# Patient Record
Sex: FEMALE | Race: WHITE | Employment: UNEMPLOYED | URBAN - METROPOLITAN AREA
[De-identification: names, ages, dates, MRNs, and addresses within clinical notes are randomized per-mention and may not be internally consistent; named-entity substitution may affect disease eponyms.]

---

## 2018-12-10 LAB
ABO/RH: ABNORMAL
ANTIBODY SCREEN: NEGATIVE
HCT VFR BLD CALC: 34 % (ref 36–46)
HEMOGLOBIN: 11.9 G/DL (ref 12–16)
HEPATITIS B SURFACE ANTIGEN: NEGATIVE
HIV AG/AB: NONREACTIVE
MCV RBC AUTO: 89 FL
PLATELET COUNT: 223
RUBV IGG SER QL: 4.96
T. PALLIDUM, IGG: NEGATIVE

## 2019-02-11 ENCOUNTER — INITIAL PRENATAL (OUTPATIENT)
Dept: OBGYN | Age: 19
End: 2019-02-11

## 2019-02-11 ENCOUNTER — HOSPITAL ENCOUNTER (OUTPATIENT)
Age: 19
Setting detail: SPECIMEN
Discharge: HOME | End: 2019-02-11
Payer: OTHER GOVERNMENT

## 2019-02-11 VITALS
BODY MASS INDEX: 20.09 KG/M2 | HEIGHT: 67 IN | WEIGHT: 128 LBS | SYSTOLIC BLOOD PRESSURE: 109 MMHG | DIASTOLIC BLOOD PRESSURE: 58 MMHG

## 2019-02-11 DIAGNOSIS — Z34.03 ENCOUNTER FOR SUPERVISION OF NORMAL FIRST PREGNANCY IN THIRD TRIMESTER: ICD-10-CM

## 2019-02-11 DIAGNOSIS — Z34.03 ENCOUNTER FOR SUPERVISION OF NORMAL FIRST PREGNANCY IN THIRD TRIMESTER: Primary | ICD-10-CM

## 2019-02-11 LAB
AMPHETAMINE SCREEN URINE: NEGATIVE
BARBITURATE SCREEN URINE: NEGATIVE
BENZODIAZEPINE SCREEN, URINE: NEGATIVE
BUPRENORPHINE URINE: NEGATIVE
CANNABINOID SCREEN URINE: NEGATIVE
COCAINE METABOLITE, URINE: NEGATIVE
MDMA URINE: NORMAL
METHADONE SCREEN, URINE: NEGATIVE
METHAMPHETAMINE, URINE: NEGATIVE
OPIATES, URINE: NEGATIVE
OXYCODONE SCREEN URINE: NEGATIVE
PHENCYCLIDINE, URINE: NEGATIVE
PROPOXYPHENE, URINE: NEGATIVE
TEST INFORMATION: NORMAL
TRICYCLIC ANTIDEPRESSANTS, UR: NEGATIVE

## 2019-02-11 PROCEDURE — 80306 DRUG TEST PRSMV INSTRMNT: CPT

## 2019-02-11 RX ORDER — PNV NO.95/FERROUS FUM/FOLIC AC 28MG-0.8MG
1 TABLET ORAL DAILY
Qty: 30 TABLET | Refills: 5 | Status: SHIPPED | OUTPATIENT
Start: 2019-02-11 | End: 2019-03-19

## 2019-02-11 RX ORDER — MULTIVITAMIN WITH IRON
100 TABLET ORAL DAILY
COMMUNITY
End: 2019-07-08 | Stop reason: ALTCHOICE

## 2019-02-18 ENCOUNTER — ROUTINE PRENATAL (OUTPATIENT)
Dept: OBGYN | Age: 19
End: 2019-02-18

## 2019-02-18 VITALS — DIASTOLIC BLOOD PRESSURE: 60 MMHG | SYSTOLIC BLOOD PRESSURE: 112 MMHG | WEIGHT: 129 LBS | BODY MASS INDEX: 20.2 KG/M2

## 2019-02-18 DIAGNOSIS — K59.00 CONSTIPATION, UNSPECIFIED CONSTIPATION TYPE: ICD-10-CM

## 2019-02-18 DIAGNOSIS — R11.0 NAUSEA: ICD-10-CM

## 2019-02-18 DIAGNOSIS — Z34.82 ENCOUNTER FOR SUPERVISION OF OTHER NORMAL PREGNANCY IN SECOND TRIMESTER: Primary | ICD-10-CM

## 2019-02-18 DIAGNOSIS — Z3A.16 16 WEEKS GESTATION OF PREGNANCY: ICD-10-CM

## 2019-02-18 PROCEDURE — 0500F INITIAL PRENATAL CARE VISIT: CPT | Performed by: ADVANCED PRACTICE MIDWIFE

## 2019-02-18 RX ORDER — PROMETHAZINE HYDROCHLORIDE 25 MG/1
25 TABLET ORAL EVERY 6 HOURS PRN
Qty: 30 TABLET | Refills: 0 | Status: SHIPPED | OUTPATIENT
Start: 2019-02-18 | End: 2019-02-25

## 2019-03-19 ENCOUNTER — ROUTINE PRENATAL (OUTPATIENT)
Dept: OBGYN | Age: 19
End: 2019-03-19
Payer: OTHER GOVERNMENT

## 2019-03-19 VITALS — WEIGHT: 139 LBS | SYSTOLIC BLOOD PRESSURE: 116 MMHG | BODY MASS INDEX: 21.77 KG/M2 | DIASTOLIC BLOOD PRESSURE: 62 MMHG

## 2019-03-19 DIAGNOSIS — Z36.3 ENCOUNTER FOR ANTENATAL SCREENING FOR MALFORMATION USING ULTRASOUND: Primary | ICD-10-CM

## 2019-03-19 DIAGNOSIS — Z34.02 ENCOUNTER FOR SUPERVISION OF NORMAL FIRST PREGNANCY IN SECOND TRIMESTER: ICD-10-CM

## 2019-03-19 DIAGNOSIS — Z3A.20 20 WEEKS GESTATION OF PREGNANCY: ICD-10-CM

## 2019-03-19 LAB
ABDOMINAL CIRCUMFERENCE: 16.05 CM
BIPARIETAL DIAMETER: 5.03 CM
ESTIMATED FETAL WEIGHT: 377 GRAMS
FEMORAL DIAMETER: NORMAL CM
FEMORAL LENGTH: 3.28 CM
HC/AC: 1.16
HEAD CIRCUMFERENCE: 18.69 CM

## 2019-03-19 PROCEDURE — 76805 OB US >/= 14 WKS SNGL FETUS: CPT | Performed by: OBSTETRICS & GYNECOLOGY

## 2019-03-19 PROCEDURE — 0502F SUBSEQUENT PRENATAL CARE: CPT | Performed by: ADVANCED PRACTICE MIDWIFE

## 2019-04-16 ENCOUNTER — ROUTINE PRENATAL (OUTPATIENT)
Dept: OBGYN | Age: 19
End: 2019-04-16

## 2019-04-16 VITALS — WEIGHT: 144 LBS | SYSTOLIC BLOOD PRESSURE: 122 MMHG | DIASTOLIC BLOOD PRESSURE: 64 MMHG | BODY MASS INDEX: 22.55 KG/M2

## 2019-04-16 DIAGNOSIS — Z3A.24 24 WEEKS GESTATION OF PREGNANCY: ICD-10-CM

## 2019-04-16 DIAGNOSIS — Z34.02 ENCOUNTER FOR SUPERVISION OF NORMAL FIRST PREGNANCY IN SECOND TRIMESTER: Primary | ICD-10-CM

## 2019-04-16 PROCEDURE — 0502F SUBSEQUENT PRENATAL CARE: CPT | Performed by: ADVANCED PRACTICE MIDWIFE

## 2019-04-16 NOTE — PROGRESS NOTES
Alva Olivia is here at 24w4d for:    Chief Complaint   Patient presents with    Routine Prenatal Visit       Estimated Due Date: Estimated Date of Delivery: 19    OB History    Para Term  AB Living   1             SAB TAB Ectopic Molar Multiple Live Births                    # Outcome Date GA Lbr Messi/2nd Weight Sex Delivery Anes PTL Lv   1 Current                 History reviewed. No pertinent past medical history. Past Surgical History:   Procedure Laterality Date    KNEE SURGERY  [id number]       Social History     Tobacco Use   Smoking Status Never Smoker   Smokeless Tobacco Never Used        Social History     Substance and Sexual Activity   Alcohol Use No       No results found for this visit on 19. Vitals:  /64   Wt 144 lb (65.3 kg)   LMP 2018   BMI 22.55 kg/m²   Estimated body mass index is 22.55 kg/m² as calculated from the following:    Height as of 19: 5' 7\" (1.702 m). Weight as of this encounter: 144 lb (65.3 kg). HPI: here for routine ob visit, c/o upper back pain mild     PT denies fever, chills, nausea and vomiting       Abdomen: enlarged, gravid, soft, nontender     Results reviewed today:    No results found for this visit on 19. See prenatal vital sign section and fetal assessment section    ASSESSMENT & Plan    Diagnosis Orders   1. Encounter for supervision of normal first pregnancy in second trimester     2. 24 weeks gestation of pregnancy               I am having Joann Vieira maintain her Prenatal Vit-Fe Fumarate-FA (PRENATAL VITAMIN PO), vitamin B-6, and (Doxylamine Succinate, Sleep, (UNISOM PO)). Return in about 1 month (around 2019) for ob gtt. There are no Patient Instructions on file for this visit.           Mable Johnson,2019 10:23 AM

## 2019-04-27 ENCOUNTER — HOSPITAL ENCOUNTER (OUTPATIENT)
Age: 19
Discharge: HOME | End: 2019-04-28
Attending: ADVANCED PRACTICE MIDWIFE | Admitting: ADVANCED PRACTICE MIDWIFE
Payer: OTHER GOVERNMENT

## 2019-04-27 LAB
-: ABNORMAL
AMORPHOUS: ABNORMAL
BACTERIA: ABNORMAL
BILIRUBIN URINE: NEGATIVE
CASTS UA: ABNORMAL /LPF
COLOR: YELLOW
COMMENT UA: ABNORMAL
CRYSTALS, UA: ABNORMAL /HPF
EPITHELIAL CELLS UA: ABNORMAL /HPF (ref 0–25)
GLUCOSE URINE: ABNORMAL
KETONES, URINE: NEGATIVE
LEUKOCYTE ESTERASE, URINE: ABNORMAL
MUCUS: ABNORMAL
NITRITE, URINE: NEGATIVE
OTHER OBSERVATIONS UA: ABNORMAL
PH UA: 6.5 (ref 5–9)
PROTEIN UA: NEGATIVE
RBC UA: ABNORMAL /HPF (ref 0–2)
RENAL EPITHELIAL, UA: ABNORMAL /HPF
SPECIFIC GRAVITY UA: <1.005 (ref 1.01–1.02)
TRICHOMONAS: ABNORMAL
TURBIDITY: CLEAR
URINE HGB: NEGATIVE
UROBILINOGEN, URINE: NORMAL
WBC UA: ABNORMAL /HPF (ref 0–5)
YEAST: ABNORMAL

## 2019-04-27 PROCEDURE — 81001 URINALYSIS AUTO W/SCOPE: CPT

## 2019-04-27 PROCEDURE — 6370000000 HC RX 637 (ALT 250 FOR IP): Performed by: ADVANCED PRACTICE MIDWIFE

## 2019-04-27 PROCEDURE — 87086 URINE CULTURE/COLONY COUNT: CPT

## 2019-04-27 RX ORDER — CEPHALEXIN 250 MG/1
500 CAPSULE ORAL ONCE
Status: COMPLETED | OUTPATIENT
Start: 2019-04-27 | End: 2019-04-27

## 2019-04-27 RX ADMIN — CEPHALEXIN 500 MG: 250 CAPSULE ORAL at 23:48

## 2019-04-28 VITALS
TEMPERATURE: 97.6 F | RESPIRATION RATE: 14 BRPM | SYSTOLIC BLOOD PRESSURE: 109 MMHG | DIASTOLIC BLOOD PRESSURE: 55 MMHG | HEART RATE: 58 BPM

## 2019-04-28 DIAGNOSIS — O26.899 ABDOMINAL PAIN AFFECTING PREGNANCY: Primary | ICD-10-CM

## 2019-04-28 DIAGNOSIS — R10.9 ABDOMINAL PAIN AFFECTING PREGNANCY: Primary | ICD-10-CM

## 2019-04-28 PROCEDURE — 99214 OFFICE O/P EST MOD 30 MIN: CPT

## 2019-04-28 RX ORDER — CEPHALEXIN 500 MG/1
500 CAPSULE ORAL 3 TIMES DAILY
Qty: 21 CAPSULE | Refills: 0 | Status: SHIPPED | OUTPATIENT
Start: 2019-04-28 | End: 2019-05-05

## 2019-04-28 NOTE — PROGRESS NOTES
Patient arrives to unit with complaint of abdominal pain. Patient rates pain a 7/10 at its worst. Patient states pain is intermittent and points to lower abdomen/groin region. Vital signs obtained and fetal heart tones reads 137-147bmp. Pt 26w1d.

## 2019-04-28 NOTE — PROGRESS NOTES
[facility] updated on patients arrival, pain level, and complaint. Notified of external monitoring and urinalysis results. New orders received at this time. Will send urine for culture and continue to monitor patient for 2 hours.

## 2019-04-28 NOTE — PROGRESS NOTES
Discharge instructions reviewed with patient and mother. Pharmacy verified with patient. Denies any questions at this time.

## 2019-04-29 ENCOUNTER — TELEPHONE (OUTPATIENT)
Dept: OBGYN | Age: 19
End: 2019-04-29

## 2019-04-29 LAB
CULTURE: NORMAL
Lab: NORMAL
SPECIMEN DESCRIPTION: NORMAL

## 2019-04-29 NOTE — PROGRESS NOTES
26 week primigravida, in with c/o lower pelvic pressure and groin pain, unfavorable cervix, urine significant for bacteria. Pain subsided after rest.  Discharged with instructions for danger signs and to f/u in office as scheduled otherwise. Will await urine culture.

## 2019-05-13 ENCOUNTER — ROUTINE PRENATAL (OUTPATIENT)
Dept: OBGYN | Age: 19
End: 2019-05-13

## 2019-05-13 VITALS — BODY MASS INDEX: 23.18 KG/M2 | DIASTOLIC BLOOD PRESSURE: 70 MMHG | WEIGHT: 148 LBS | SYSTOLIC BLOOD PRESSURE: 122 MMHG

## 2019-05-13 DIAGNOSIS — Z3A.28 28 WEEKS GESTATION OF PREGNANCY: ICD-10-CM

## 2019-05-13 DIAGNOSIS — O99.810 ABNORMAL GLUCOSE AFFECTING PREGNANCY: Primary | ICD-10-CM

## 2019-05-13 DIAGNOSIS — Z34.03 ENCOUNTER FOR SUPERVISION OF NORMAL FIRST PREGNANCY IN THIRD TRIMESTER: Primary | ICD-10-CM

## 2019-05-13 LAB
CHP ED QC CHECK: ABNORMAL
GLUCOSE BLD-MCNC: 152 MG/DL
HGB, POC: 11

## 2019-05-13 PROCEDURE — 0502F SUBSEQUENT PRENATAL CARE: CPT | Performed by: ADVANCED PRACTICE MIDWIFE

## 2019-05-13 NOTE — PROGRESS NOTES
Tarsha Leiva is here at 28w3d for:    Chief Complaint   Patient presents with    Routine Prenatal Visit     1 hour gtt. today. Estimated Due Date: Estimated Date of Delivery: 19    OB History    Para Term  AB Living   1 0 0 0 0     SAB TAB Ectopic Molar Multiple Live Births   0 0 0 0          # Outcome Date GA Lbr Messi/2nd Weight Sex Delivery Anes PTL Lv   1 Current                 No past medical history on file. Past Surgical History:   Procedure Laterality Date    KNEE ARTHROSCOPY Right     KNEE ARTHROSCOPY Right 2016    Medial Menisectomy    KNEE SURGERY  [id number]       Social History     Tobacco Use   Smoking Status Never Smoker   Smokeless Tobacco Never Used        Social History     Substance and Sexual Activity   Alcohol Use No       No results found for this visit on 19. Vitals:  /70   Wt 148 lb (67.1 kg)   LMP 2018   BMI 23.18 kg/m²   Estimated body mass index is 23.18 kg/m² as calculated from the following:    Height as of 19: 5' 7\" (1.702 m). Weight as of this encounter: 148 lb (67.1 kg). HPI: here for routine ob visit and gtt     PT denies fever, chills, nausea and vomiting       Abdomen: enlarged, gravid, soft, nontender     Results reviewed today:    No results found for this visit on 19. See prenatal vital sign section and fetal assessment section    ASSESSMENT & Plan    Diagnosis Orders   1. Encounter for supervision of normal first pregnancy in third trimester     2. 28 weeks gestation of pregnancy               I am having Joann Vieira maintain her ketorolac, traMADol, Prenatal Vit-Fe Fumarate-FA (PRENATAL VITAMIN PO), vitamin B-6, and (Doxylamine Succinate, Sleep, (UNISOM PO)). Return in about 2 weeks (around 2019) for ob 30wkUS+tdap.     Patient Instructions          (Friday 6:30-9:30) &  (Sat 9am-4pm)    , ,  ( 6:30-9:30)    March - NO CLASSES     (Friday 6:30pm-9:30pm) & 6th (Sat 9am-4pm)    May 6th, 13th, 20th ( Mondays 6:30pm-9:30pm)    June - NO CLASSES    July 12th (Friday 6:30pm-9:30pm) & 13th (Sat 9am-4pm)    August 1st, 8th, 15th (Thursdas 6:30pm-9:30pm)    September - NO CLASSES    October 11th (Friday 6:30pm-9:30pm) & 12th (Sat 9am-4pm)    November 4th, 11th, 18th (Mondays 6:30-9:30)    December - NO CLASSES      There is no charge for classes. Please bring a pillow to class. Bring a support person.       To sign up for classes  Call the Obstetrics Department at  [facility] at  [phone]          [facility] classes 2019    Classes are held in the [address] [facility] [address]  Class time 6:30pm-8:30pm    Monday January 14    Thursday March 7th    Thursday May 16th    Monday July 15th     Thursday September 19    Monday November 25th    These classes are free    To sign up for classes  call the Obstetrics Department at  [facility] at  [phone] Johnson,5/13/2019 9:26 AM

## 2019-05-15 ENCOUNTER — HOSPITAL ENCOUNTER (OUTPATIENT)
Dept: LAB | Age: 19
Discharge: HOME | End: 2019-05-15
Payer: OTHER GOVERNMENT

## 2019-05-15 DIAGNOSIS — O24.419 GESTATIONAL DIABETES MELLITUS (GDM) IN SECOND TRIMESTER, GESTATIONAL DIABETES METHOD OF CONTROL UNSPECIFIED: Primary | ICD-10-CM

## 2019-05-15 DIAGNOSIS — O99.810 ABNORMAL GLUCOSE AFFECTING PREGNANCY: ICD-10-CM

## 2019-05-15 LAB
3 HR GLUCOSE: 114 MG/DL (ref 65–139)
AMOUNT GLUCOSE GIVEN: 100 G
GLUCOSE FASTING: 69 MG/DL (ref 65–94)
GLUCOSE TOLERANCE TEST 1 HOUR: 184 MG/DL (ref 65–179)
GLUCOSE TOLERANCE TEST 2 HOUR: 167 MG/DL (ref 65–154)

## 2019-05-15 PROCEDURE — 82951 GLUCOSE TOLERANCE TEST (GTT): CPT

## 2019-05-15 PROCEDURE — 36415 COLL VENOUS BLD VENIPUNCTURE: CPT

## 2019-05-15 PROCEDURE — 82952 GTT-ADDED SAMPLES: CPT

## 2019-05-15 NOTE — RESULT ENCOUNTER NOTE
Pt. notified of results and voices understanding. Referral for dietary education entered and testing supplies.

## 2019-05-17 ENCOUNTER — HOSPITAL ENCOUNTER (OUTPATIENT)
Dept: DIABETES SERVICES | Age: 19
Setting detail: THERAPIES SERIES
Discharge: HOME | End: 2019-05-17
Payer: OTHER GOVERNMENT

## 2019-05-17 ENCOUNTER — HOSPITAL ENCOUNTER (OUTPATIENT)
Dept: NUTRITION | Age: 19
Discharge: HOME | End: 2019-05-17
Payer: OTHER GOVERNMENT

## 2019-05-17 VITALS — WEIGHT: 147.38 LBS | BODY MASS INDEX: 23.13 KG/M2 | HEIGHT: 67 IN

## 2019-05-17 PROCEDURE — G0108 DIAB MANAGE TRN  PER INDIV: HCPCS

## 2019-05-17 PROCEDURE — 97802 MEDICAL NUTRITION INDIV IN: CPT

## 2019-05-28 ENCOUNTER — ROUTINE PRENATAL (OUTPATIENT)
Dept: OBGYN | Age: 19
End: 2019-05-28
Payer: OTHER GOVERNMENT

## 2019-05-28 VITALS — SYSTOLIC BLOOD PRESSURE: 118 MMHG | BODY MASS INDEX: 23.12 KG/M2 | DIASTOLIC BLOOD PRESSURE: 62 MMHG | WEIGHT: 147.6 LBS

## 2019-05-28 DIAGNOSIS — Z3A.30 30 WEEKS GESTATION OF PREGNANCY: ICD-10-CM

## 2019-05-28 DIAGNOSIS — Z34.03 ENCOUNTER FOR SUPERVISION OF NORMAL FIRST PREGNANCY IN THIRD TRIMESTER: Primary | ICD-10-CM

## 2019-05-28 DIAGNOSIS — O24.410 DIET CONTROLLED GESTATIONAL DIABETES MELLITUS (GDM) IN THIRD TRIMESTER: ICD-10-CM

## 2019-05-28 DIAGNOSIS — Z23 NEED FOR TDAP VACCINATION: ICD-10-CM

## 2019-05-28 LAB
ABDOMINAL CIRCUMFERENCE: 26.63 CM
BIPARIETAL DIAMETER: 8.31 CM
ESTIMATED FETAL WEIGHT: 1698 GRAMS
FEMORAL DIAMETER: ABNORMAL CM
HC/AC: 1.12
HEAD CIRCUMFERENCE: 29.87 CM

## 2019-05-28 PROCEDURE — 76816 OB US FOLLOW-UP PER FETUS: CPT | Performed by: OBSTETRICS & GYNECOLOGY

## 2019-05-28 PROCEDURE — 0502F SUBSEQUENT PRENATAL CARE: CPT | Performed by: ADVANCED PRACTICE MIDWIFE

## 2019-05-28 NOTE — PROGRESS NOTES
Kyle Patton is here at 30w4d for:    Chief Complaint   Patient presents with    Routine Prenatal Visit     Follow up in  house ultrasound. Review blood sugar log. Estimated Due Date: Estimated Date of Delivery: 19    OB History    Para Term  AB Living   1 0 0 0 0     SAB TAB Ectopic Molar Multiple Live Births   0 0 0 0          # Outcome Date GA Lbr Messi/2nd Weight Sex Delivery Anes PTL Lv   1 Current                 No past medical history on file. Past Surgical History:   Procedure Laterality Date    KNEE ARTHROSCOPY Right     KNEE ARTHROSCOPY Right 2016    Medial Menisectomy    KNEE SURGERY  [id number]       Social History     Tobacco Use   Smoking Status Never Smoker   Smokeless Tobacco Never Used        Social History     Substance and Sexual Activity   Alcohol Use No       Results for orders placed or performed in visit on 19   US OB FOLLOW UP TRANSABDOMINAL APPROACH   Result Value Ref Range    Biparietal Diameter 8.31 cm    Abdominal Circumference 26.63 cm    Femoral Diameter  cm    Head Circumference 29.87 cm    HC/AC 1.12     Estimated Fetal Weight 1,698 grams       Vitals:  /62   Wt 147 lb 9.6 oz (67 kg)   LMP 2018   BMI 23.12 kg/m²   Estimated body mass index is 23.12 kg/m² as calculated from the following:    Height as of 19: 5' 7\" (1.702 m). Weight as of this encounter: 147 lb 9.6 oz (67 kg).       HPI: here for routine ob visit and growth sono WNL; sugar log all normal except x1 156 after ice cream, encouraged diet complicance     PT denies fever, chills, nausea and vomiting       Abdomen: enlarged, gravid, soft, nontender     Results reviewed today:    Results for orders placed or performed in visit on 19   US OB FOLLOW UP TRANSABDOMINAL APPROACH   Result Value Ref Range    Biparietal Diameter 8.31 cm    Abdominal Circumference 26.63 cm    Femoral Diameter  cm    Head Circumference 29.87 cm    HC/AC 1.12     Estimated Fetal Weight 1,698 grams       See prenatal vital sign section and fetal assessment section    ASSESSMENT & Plan    Diagnosis Orders   1. Encounter for supervision of normal first pregnancy in third trimester  US OB FOLLOW UP TRANSABDOMINAL APPROACH   2. 30 weeks gestation of pregnancy  US OB FOLLOW UP TRANSABDOMINAL APPROACH   3. Diet controlled gestational diabetes mellitus (GDM) in third trimester               I am having Joann Vieira maintain her ketorolac, traMADol, blood glucose monitor supplies, FREESTYLE LITE, FREESTYLE LANCETS, Prenatal Vit-Fe Fumarate-FA (PRENATAL VITAMIN PO), vitamin B-6, and (Doxylamine Succinate, Sleep, (UNISOM PO)). Return in about 2 weeks (around 6/11/2019) for ob and 4 week sono for EFW. There are no Patient Instructions on file for this visit.           Kisha Johnson,5/28/2019 11:03 AM

## 2019-05-30 ENCOUNTER — TELEPHONE (OUTPATIENT)
Dept: OBGYN | Age: 19
End: 2019-05-30

## 2019-05-30 NOTE — TELEPHONE ENCOUNTER
Phone call from patient's mother, Evgeny Suero passed out at work today and was transported via ambulance to [facility]. She is currently in the [facility] department in [place] for additional monitoring but will most likely be discharged this afternoon. Next scheduled appointment here on 06/10/2019. Advice?

## 2019-05-30 NOTE — TELEPHONE ENCOUNTER
Off work until we can evaluate her. Have her come in here beginning of next week.  Probably not eating enough due to the diet etc. And the heat

## 2019-06-03 ENCOUNTER — ROUTINE PRENATAL (OUTPATIENT)
Dept: OBGYN | Age: 19
End: 2019-06-03

## 2019-06-03 ENCOUNTER — TELEPHONE (OUTPATIENT)
Dept: OBGYN | Age: 19
End: 2019-06-03

## 2019-06-03 VITALS — DIASTOLIC BLOOD PRESSURE: 62 MMHG | BODY MASS INDEX: 23.18 KG/M2 | WEIGHT: 148 LBS | SYSTOLIC BLOOD PRESSURE: 112 MMHG

## 2019-06-03 DIAGNOSIS — R55 SYNCOPE, UNSPECIFIED SYNCOPE TYPE: Primary | ICD-10-CM

## 2019-06-03 DIAGNOSIS — Z3A.31 31 WEEKS GESTATION OF PREGNANCY: ICD-10-CM

## 2019-06-03 PROCEDURE — 0502F SUBSEQUENT PRENATAL CARE: CPT | Performed by: ADVANCED PRACTICE MIDWIFE

## 2019-06-03 NOTE — PROGRESS NOTES
Virgilio Melgoza is here at 31w3d for:    Chief Complaint   Patient presents with    Routine Prenatal Visit     Hospital follow up, patient passed out at work on 2019 and was transported and monitored at [facility].        Estimated Due Date: Estimated Date of Delivery: 19    OB History    Para Term  AB Living   1 0 0 0 0     SAB TAB Ectopic Molar Multiple Live Births   0 0 0 0          # Outcome Date GA Lbr Messi/2nd Weight Sex Delivery Anes PTL Lv   1 Current                 No past medical history on file. Past Surgical History:   Procedure Laterality Date    KNEE ARTHROSCOPY Right     KNEE ARTHROSCOPY Right 2016    Medial Menisectomy    KNEE SURGERY  [id number]       Social History     Tobacco Use   Smoking Status Never Smoker   Smokeless Tobacco Never Used        Social History     Substance and Sexual Activity   Alcohol Use No       No results found for this visit on 19. Vitals:  /62   Wt 148 lb (67.1 kg)   LMP 2018   BMI 23.18 kg/m²   Estimated body mass index is 23.18 kg/m² as calculated from the following:    Height as of 19: 5' 7\" (1.702 m). Weight as of this encounter: 148 lb (67.1 kg). HPI: here for f/u LOC and trip to [facility] ED, had no precursors sugars have been \"mostly 70s\" up to 110     PT denies fever, chills, nausea and vomiting       Abdomen: enlarged, gravid,m soft, nontender   Results reviewed today:    No results found for this visit on 19. See prenatal vital sign section and fetal assessment section    ASSESSMENT & Plan    Diagnosis Orders   1.  Syncope, unspecified syncope type     2. 31 weeks gestation of pregnancy         reviewed danger signs, increase fluids and kcal by fats and proteins, consider holter monitor if further symptoms      I am having Joann Dariel maintain her ketorolac, traMADol, blood glucose monitor supplies, FREESTYLE LITE, FREESTYLE LANCETS, Prenatal Vit-Fe Fumarate-FA (PRENATAL VITAMIN PO),

## 2019-06-06 ENCOUNTER — HOSPITAL ENCOUNTER (OUTPATIENT)
Age: 19
Discharge: HOME | End: 2019-06-06
Attending: ADVANCED PRACTICE MIDWIFE | Admitting: ADVANCED PRACTICE MIDWIFE
Payer: OTHER GOVERNMENT

## 2019-06-06 ENCOUNTER — HOSPITAL ENCOUNTER (OUTPATIENT)
Age: 19
End: 2019-06-06
Attending: ADVANCED PRACTICE MIDWIFE | Admitting: ADVANCED PRACTICE MIDWIFE
Payer: OTHER GOVERNMENT

## 2019-06-06 ENCOUNTER — TELEPHONE (OUTPATIENT)
Dept: OBGYN | Age: 19
End: 2019-06-06

## 2019-06-06 ENCOUNTER — HOSPITAL ENCOUNTER (EMERGENCY)
Age: 19
Discharge: HOME | End: 2019-06-06
Attending: EMERGENCY MEDICINE
Payer: OTHER GOVERNMENT

## 2019-06-06 VITALS
HEART RATE: 66 BPM | DIASTOLIC BLOOD PRESSURE: 63 MMHG | SYSTOLIC BLOOD PRESSURE: 124 MMHG | OXYGEN SATURATION: 100 % | TEMPERATURE: 97.4 F | RESPIRATION RATE: 18 BRPM

## 2019-06-06 VITALS
DIASTOLIC BLOOD PRESSURE: 67 MMHG | RESPIRATION RATE: 18 BRPM | HEART RATE: 90 BPM | SYSTOLIC BLOOD PRESSURE: 122 MMHG | TEMPERATURE: 98 F

## 2019-06-06 DIAGNOSIS — E16.2 HYPOGLYCEMIA: Primary | ICD-10-CM

## 2019-06-06 LAB
-: ABNORMAL
ABSOLUTE EOS #: 0.11 K/UL (ref 0–0.44)
ABSOLUTE IMMATURE GRANULOCYTE: 0.06 K/UL (ref 0–0.3)
ABSOLUTE LYMPH #: 0.99 K/UL (ref 1.2–5.2)
ABSOLUTE MONO #: 0.58 K/UL (ref 0.1–1.4)
ALBUMIN SERPL-MCNC: 3.6 G/DL (ref 3.5–5.2)
ALBUMIN/GLOBULIN RATIO: 1.1 (ref 1–2.5)
ALP BLD-CCNC: 94 U/L (ref 35–104)
ALT SERPL-CCNC: 13 U/L (ref 5–33)
AMORPHOUS: ABNORMAL
ANION GAP SERPL CALCULATED.3IONS-SCNC: 11 MMOL/L (ref 9–17)
AST SERPL-CCNC: 19 U/L
BACTERIA: ABNORMAL
BASOPHILS # BLD: 1 % (ref 0–2)
BASOPHILS ABSOLUTE: 0.04 K/UL (ref 0–0.2)
BILIRUB SERPL-MCNC: 0.39 MG/DL (ref 0.3–1.2)
BILIRUBIN URINE: NEGATIVE
BUN BLDV-MCNC: 10 MG/DL (ref 6–20)
BUN/CREAT BLD: 13 (ref 9–20)
CALCIUM SERPL-MCNC: 9.1 MG/DL (ref 8.6–10.4)
CHLORIDE BLD-SCNC: 103 MMOL/L (ref 98–107)
CO2: 25 MMOL/L (ref 20–31)
COLOR: YELLOW
COMMENT UA: ABNORMAL
CREAT SERPL-MCNC: 0.76 MG/DL (ref 0.5–0.9)
DIFFERENTIAL TYPE: ABNORMAL
EKG ATRIAL RATE: 61 BPM
EKG P AXIS: 27 DEGREES
EKG P-R INTERVAL: 110 MS
EKG Q-T INTERVAL: 402 MS
EKG QRS DURATION: 76 MS
EKG QTC CALCULATION (BAZETT): 404 MS
EKG R AXIS: 30 DEGREES
EKG T AXIS: 18 DEGREES
EKG VENTRICULAR RATE: 61 BPM
EOSINOPHILS RELATIVE PERCENT: 2 % (ref 1–4)
EPITHELIAL CELLS UA: ABNORMAL /HPF (ref 0–25)
GFR AFRICAN AMERICAN: >60 ML/MIN
GFR NON-AFRICAN AMERICAN: >60 ML/MIN
GFR SERPL CREATININE-BSD FRML MDRD: ABNORMAL ML/MIN/{1.73_M2}
GFR SERPL CREATININE-BSD FRML MDRD: ABNORMAL ML/MIN/{1.73_M2}
GLUCOSE BLD-MCNC: 64 MG/DL (ref 70–99)
GLUCOSE BLD-MCNC: 73 MG/DL (ref 74–100)
GLUCOSE BLD-MCNC: 81 MG/DL (ref 74–100)
GLUCOSE URINE: NEGATIVE
HCT VFR BLD CALC: 31.5 % (ref 36.3–47.1)
HEMOGLOBIN: 10.5 G/DL (ref 11.9–15.1)
IMMATURE GRANULOCYTES: 1 %
KETONES, URINE: NEGATIVE
LEUKOCYTE ESTERASE, URINE: ABNORMAL
LYMPHOCYTES # BLD: 14 % (ref 25–45)
MCH RBC QN AUTO: 32.8 PG (ref 25.2–33.5)
MCHC RBC AUTO-ENTMCNC: 33.3 G/DL (ref 28.4–34.8)
MCV RBC AUTO: 98.4 FL (ref 82.6–102.9)
MONOCYTES # BLD: 8 % (ref 2–8)
MUCUS: ABNORMAL
NITRITE, URINE: NEGATIVE
NRBC AUTOMATED: 0 PER 100 WBC
PDW BLD-RTO: 12.1 % (ref 11.8–14.4)
PH UA: 7 (ref 5–9)
PLATELET # BLD: 166 K/UL (ref 138–453)
PLATELET ESTIMATE: ABNORMAL
PMV BLD AUTO: 9.6 FL (ref 8.1–13.5)
POTASSIUM SERPL-SCNC: 4.2 MMOL/L (ref 3.7–5.3)
PROTEIN UA: NEGATIVE
RBC # BLD: 3.2 M/UL (ref 3.95–5.11)
RBC # BLD: ABNORMAL 10*6/UL
RBC UA: ABNORMAL /HPF (ref 0–2)
RENAL EPITHELIAL, UA: ABNORMAL /HPF
SEG NEUTROPHILS: 74 % (ref 34–64)
SEGMENTED NEUTROPHILS ABSOLUTE COUNT: 5.16 K/UL (ref 1.8–8)
SODIUM BLD-SCNC: 139 MMOL/L (ref 135–144)
SPECIFIC GRAVITY UA: <1.005 (ref 1.01–1.02)
THYROXINE, FREE: 1.17 NG/DL (ref 0.93–1.7)
TOTAL PROTEIN: 6.8 G/DL (ref 6.4–8.3)
TRICHOMONAS: ABNORMAL
TROPONIN INTERP: NORMAL
TROPONIN T: <0.03 NG/ML
TROPONIN, HIGH SENSITIVITY: NORMAL NG/L (ref 0–14)
TSH SERPL DL<=0.05 MIU/L-ACNC: 1.87 MIU/L (ref 0.3–5)
TURBIDITY: CLEAR
URINE HGB: NEGATIVE
UROBILINOGEN, URINE: NORMAL
WBC # BLD: 6.9 K/UL (ref 4.5–13.5)
WBC # BLD: ABNORMAL 10*3/UL
WBC UA: ABNORMAL /HPF (ref 0–5)
YEAST: ABNORMAL

## 2019-06-06 PROCEDURE — 84443 ASSAY THYROID STIM HORMONE: CPT

## 2019-06-06 PROCEDURE — 2580000003 HC RX 258: Performed by: ADVANCED PRACTICE MIDWIFE

## 2019-06-06 PROCEDURE — 93010 ELECTROCARDIOGRAM REPORT: CPT | Performed by: INTERNAL MEDICINE

## 2019-06-06 PROCEDURE — 99215 OFFICE O/P EST HI 40 MIN: CPT

## 2019-06-06 PROCEDURE — 82947 ASSAY GLUCOSE BLOOD QUANT: CPT

## 2019-06-06 PROCEDURE — 93226 XTRNL ECG REC<48 HR SCAN A/R: CPT

## 2019-06-06 PROCEDURE — 96366 THER/PROPH/DIAG IV INF ADDON: CPT

## 2019-06-06 PROCEDURE — 93005 ELECTROCARDIOGRAM TRACING: CPT | Performed by: EMERGENCY MEDICINE

## 2019-06-06 PROCEDURE — 96361 HYDRATE IV INFUSION ADD-ON: CPT

## 2019-06-06 PROCEDURE — 99284 EMERGENCY DEPT VISIT MOD MDM: CPT

## 2019-06-06 PROCEDURE — 84439 ASSAY OF FREE THYROXINE: CPT

## 2019-06-06 PROCEDURE — 81001 URINALYSIS AUTO W/SCOPE: CPT

## 2019-06-06 PROCEDURE — 85025 COMPLETE CBC W/AUTO DIFF WBC: CPT

## 2019-06-06 PROCEDURE — 80053 COMPREHEN METABOLIC PANEL: CPT

## 2019-06-06 PROCEDURE — 96360 HYDRATION IV INFUSION INIT: CPT

## 2019-06-06 PROCEDURE — 93225 XTRNL ECG REC<48 HRS REC: CPT

## 2019-06-06 PROCEDURE — 84484 ASSAY OF TROPONIN QUANT: CPT

## 2019-06-06 RX ORDER — DEXTROSE AND SODIUM CHLORIDE 5; .45 G/100ML; G/100ML
INJECTION, SOLUTION INTRAVENOUS CONTINUOUS
Status: DISCONTINUED | OUTPATIENT
Start: 2019-06-06 | End: 2019-06-06 | Stop reason: HOSPADM

## 2019-06-06 RX ADMIN — DEXTROSE AND SODIUM CHLORIDE: 5; 450 INJECTION, SOLUTION INTRAVENOUS at 16:26

## 2019-06-06 ASSESSMENT — ENCOUNTER SYMPTOMS
DIARRHEA: 0
NAUSEA: 0
SORE THROAT: 0
RHINORRHEA: 0
SHORTNESS OF BREATH: 0
VOMITING: 0
WHEEZING: 0
COUGH: 0
CONSTIPATION: 0
ABDOMINAL DISTENTION: 0

## 2019-06-06 NOTE — TELEPHONE ENCOUNTER
Phone call from patients mother Brendan Carrion had anther episode of feeling hot and dizzy. .Mother mentioned about possible heart monitor?

## 2019-06-06 NOTE — ED PROVIDER NOTES
status: Never Smoker    Smokeless tobacco: Never Used   Substance and Sexual Activity    Alcohol use: No    Drug use: No    Sexual activity: Yes     Partners: Male   Lifestyle    Physical activity:     Days per week: Not on file     Minutes per session: Not on file    Stress: Not on file   Relationships    Social connections:     Talks on phone: Not on file     Gets together: Not on file     Attends Orthodox service: Not on file     Active member of club or organization: Not on file     Attends meetings of clubs or organizations: Not on file     Relationship status: Not on file    Intimate partner violence:     Fear of current or ex partner: Not on file     Emotionally abused: Not on file     Physically abused: Not on file     Forced sexual activity: Not on file   Other Topics Concern    Not on file   Social History Narrative    ** Merged History Encounter **            Family History   Problem Relation Age of Onset    Heart Surgery Maternal Grandfather     Lung Cancer Maternal Grandfather     Hypertension Maternal Grandfather        Allergies:  Norco [hydrocodone-acetaminophen]    Home Medications:  Prior to Admission medications    Medication Sig Start Date End Date Taking? Authorizing Provider   FREESTYLE LITE strip USE 1 TEST STRIP TO TEST BLOOD SUGAR THREE TIMES DAILY 5/16/19   MAGGIE Conrad CNM   FREESTYLE LANCETS MISC 200 Int'l Units by Does not apply route 4 times daily 5/16/19   MAGGIE Conrad CNM   blood glucose monitor supplies One glucometer, test strips, lancets - per insurance coverage. Patient to test blood sugar fasting in am and 1 hour post prandial three times daily.  5/15/19   MAGGIE Conrad CNM   Prenatal Vit-Fe Fumarate-FA (PRENATAL VITAMIN PO) Take by mouth    Historical Provider, MD   vitamin B-6 (PYRIDOXINE) 100 MG tablet Take 100 mg by mouth daily    Historical Provider, MD   Doxylamine Succinate, Sleep, (UNISOM PO) Take by mouth Historical Provider, MD   ketorolac (TORADOL) 10 MG tablet Take 1 tablet by mouth 3 times daily for 5 days 1 tab by mouth 3 times daily 11/4/16 11/9/16  Salvador Bishop MD   traMADol Riley Ulisses) 50 MG tablet 1-2 po q 6 hours prn pain 11/4/16   Salvador Bishop MD       REVIEW OF SYSTEMS    (2-9 systems for level 4, 10 or more for level 5)      Review of Systems   Constitutional: Negative for activity change, appetite change, fatigue and fever. HENT: Negative for congestion, rhinorrhea and sore throat. Respiratory: Negative for cough, shortness of breath and wheezing. Cardiovascular: Negative for chest pain, palpitations and leg swelling. Gastrointestinal: Negative for abdominal distention, constipation, diarrhea, nausea and vomiting. Genitourinary: Negative for decreased urine volume and dysuria. Skin: Negative for rash. Neurological: Positive for dizziness and light-headedness. Negative for weakness, numbness and headaches. PHYSICAL EXAM   (up to 7 for level 4, 8 or more for level 5)     INITIAL VITALS:   /63   Pulse 66   Temp 97.4 °F (36.3 °C) (Tympanic)   Resp 18   LMP 09/30/2018   SpO2 100%     Physical Exam   Constitutional: She is oriented to person, place, and time. Nontoxic appearing, answering all questions appropriately no signs of distress   HENT:   Head: Normocephalic and atraumatic. Eyes: Conjunctivae are normal. Right eye exhibits no discharge. Left eye exhibits no discharge. Cardiovascular: Normal rate, regular rhythm and normal heart sounds. Exam reveals no gallop and no friction rub. No murmur heard. Pulmonary/Chest: Effort normal and breath sounds normal. No respiratory distress. She has no wheezes. She has no rales. She exhibits no tenderness. Abdominal: Soft. She exhibits no distension. There is no tenderness. There is no rebound and no guarding. Gravid uterus   Neurological: She is alert and oriented to person, place, and time.    Skin: Skin is warm and dry. No rash noted. Nursing note and vitals reviewed. DIFFERENTIAL  DIAGNOSIS     patient with episodes of dizziness. An lightheadedness, episode of syncope one week ago. Her sugar after symptoms and eating was 64 in the emergency room. I do feel that patient is getting hypoglycemic causing the symptoms. We'll talk with OB, and do bedside ultrasound a baby, and get EKG    PLAN (LABS / IMAGING / EKG):  Orders Placed This Encounter   Procedures    CBC Auto Differential    Comprehensive Metabolic Panel    Orthostatic blood pressure and pulse    Telemetry monitoring    Vital signs - notify MD    Assess fetal heart tones    EKG 12 Lead    Insert peripheral IV       MEDICATIONS ORDERED:  No orders of the defined types were placed in this encounter.       DIAGNOSTIC RESULTS / EMERGENCY DEPARTMENT COURSE / MDM     LABS:  Results for orders placed or performed during the hospital encounter of 06/06/19   CBC Auto Differential   Result Value Ref Range    WBC 6.9 4.5 - 13.5 k/uL    RBC 3.20 (L) 3.95 - 5.11 m/uL    Hemoglobin 10.5 (L) 11.9 - 15.1 g/dL    Hematocrit 31.5 (L) 36.3 - 47.1 %    MCV 98.4 82.6 - 102.9 fL    MCH 32.8 25.2 - 33.5 pg    MCHC 33.3 28.4 - 34.8 g/dL    RDW 12.1 11.8 - 14.4 %    Platelets 838 712 - 310 k/uL    MPV 9.6 8.1 - 13.5 fL    NRBC Automated 0.0 0.0 per 100 WBC    Differential Type NOT REPORTED     Seg Neutrophils 74 (H) 34 - 64 %    Lymphocytes 14 (L) 25 - 45 %    Monocytes 8 2 - 8 %    Eosinophils % 2 1 - 4 %    Basophils 1 0 - 2 %    Immature Granulocytes 1 (H) 0 %    Segs Absolute 5.16 1.80 - 8.00 k/uL    Absolute Lymph # 0.99 (L) 1.20 - 5.20 k/uL    Absolute Mono # 0.58 0.10 - 1.40 k/uL    Absolute Eos # 0.11 0.00 - 0.44 k/uL    Basophils # 0.04 0.00 - 0.20 k/uL    Absolute Immature Granulocyte 0.06 0.00 - 0.30 k/uL    WBC Morphology NOT REPORTED     RBC Morphology NOT REPORTED     Platelet Estimate NOT REPORTED    Comprehensive Metabolic Panel   Result Value Ref Range Glucose 64 (L) 70 - 99 mg/dL    BUN 10 6 - 20 mg/dL    CREATININE 0.76 0.50 - 0.90 mg/dL    Bun/Cre Ratio 13 9 - 20    Calcium 9.1 8.6 - 10.4 mg/dL    Sodium 139 135 - 144 mmol/L    Potassium 4.2 3.7 - 5.3 mmol/L    Chloride 103 98 - 107 mmol/L    CO2 25 20 - 31 mmol/L    Anion Gap 11 9 - 17 mmol/L    Alkaline Phosphatase 94 35 - 104 U/L    ALT 13 5 - 33 U/L    AST 19 <32 U/L    Total Bilirubin 0.39 0.3 - 1.2 mg/dL    Total Protein 6.8 6.4 - 8.3 g/dL    Alb 3.6 3.5 - 5.2 g/dL    Albumin/Globulin Ratio 1.1 1.0 - 2.5    GFR Non-African American >60 >60 mL/min    GFR African American >60 >60 mL/min    GFR Comment          GFR Staging         Troponin   Result Value Ref Range    Troponin, High Sensitivity NOT REPORTED 0 - 14 ng/L    Troponin T <0.03 <0.03 ng/mL    Troponin Interp         EKG 12 Lead   Result Value Ref Range    Ventricular Rate 61 BPM    Atrial Rate 61 BPM    P-R Interval 110 ms    QRS Duration 76 ms    Q-T Interval 402 ms    QTc Calculation (Bazett) 404 ms    P Axis 27 degrees    R Axis 30 degrees    T Axis 18 degrees       IMPRESSION: Hypoglycemia      EKG:  EKG shows normal sinus rhythm, there is a short GA interval but no delta wave is noted. No ST elevations or depressions noted. POC ULTRASOUND:  FETAL ULTRASOUND: A limited, bedside pelvic ultrasound was performed using a transabdominal probe. The medical necessity was to evaluate for signs of fetal distress. The structures studied were the uterus and its contents. FINDINGS:  Fetus was visualized  Gross fetal movement was visualized. Fetal heart tones measured at 148 bpm.  The study was technically adequate. EMERGENCY DEPARTMENT COURSE:  2:45 PM  Spoke with Steve irving updated on results of blood testing, and bedside US, and plan to send to ob for furthur monitoring, and glucose check. Patient currently eating peanut butter and crackers, and drinking orange juice        FINAL IMPRESSION      1.  Hypoglycemia          DISPOSITION /

## 2019-06-06 NOTE — ED NOTES
OB called about this patient; awaiting a designated room for the patient to be brought up to       Ty Gutierrez RN  06/06/19 [id number]

## 2019-06-06 NOTE — FLOWSHEET NOTE
CALLED LAB TO CHECK IF BLOOD SAMPLE OBTAINED IN ER IS SUFFICIENT FOR NEW LAB ORDERS.  STATES SHE WILL BE ABLE TO RUN NEW LABS OFF OLD SAMPLE.

## 2019-06-06 NOTE — FLOWSHEET NOTE
PT ARRIVES TO OB UNIT PER W/C FROM ER ACCOMPANIED BY ER STAFF. PT STATES WENT TO ER THIS AFTERNOON D/T FEELING DIZZY, LIGHT-HEADED AND FEELING LIKE PASSING OUT. PT ALSO STATES FEELING VAGINAL DISCHARGE NOTED TO BE WHITE. DENIES C/O LEAKING, VAGINAL BLEEDING, AND GUSHING OF FLUID. EFM AND TOCO APPLIED. ORIENTED PT TO POC, STAFF, AND ROOM.

## 2019-06-10 ENCOUNTER — ROUTINE PRENATAL (OUTPATIENT)
Dept: OBGYN | Age: 19
End: 2019-06-10

## 2019-06-10 ENCOUNTER — HOSPITAL ENCOUNTER (OUTPATIENT)
Age: 19
Discharge: HOME | End: 2019-06-10
Payer: OTHER GOVERNMENT

## 2019-06-10 ENCOUNTER — TELEPHONE (OUTPATIENT)
Dept: OBGYN | Age: 19
End: 2019-06-10

## 2019-06-10 VITALS — BODY MASS INDEX: 23.46 KG/M2 | DIASTOLIC BLOOD PRESSURE: 64 MMHG | WEIGHT: 149.8 LBS | SYSTOLIC BLOOD PRESSURE: 108 MMHG

## 2019-06-10 DIAGNOSIS — O24.419 GESTATIONAL DIABETES MELLITUS (GDM) IN THIRD TRIMESTER, GESTATIONAL DIABETES METHOD OF CONTROL UNSPECIFIED: Primary | ICD-10-CM

## 2019-06-10 DIAGNOSIS — Z3A.32 32 WEEKS GESTATION OF PREGNANCY: ICD-10-CM

## 2019-06-10 DIAGNOSIS — E16.2 HYPOGLYCEMIA: Primary | ICD-10-CM

## 2019-06-10 DIAGNOSIS — R55 SYNCOPE, UNSPECIFIED SYNCOPE TYPE: ICD-10-CM

## 2019-06-10 DIAGNOSIS — Z34.03 ENCOUNTER FOR SUPERVISION OF NORMAL FIRST PREGNANCY IN THIRD TRIMESTER: Primary | ICD-10-CM

## 2019-06-10 DIAGNOSIS — E16.2 HYPOGLYCEMIA: ICD-10-CM

## 2019-06-10 LAB
AMYLASE: 67 U/L (ref 28–100)
LIPASE: 22 U/L (ref 13–60)

## 2019-06-10 PROCEDURE — 0502F SUBSEQUENT PRENATAL CARE: CPT | Performed by: ADVANCED PRACTICE MIDWIFE

## 2019-06-10 PROCEDURE — 83690 ASSAY OF LIPASE: CPT

## 2019-06-10 PROCEDURE — 82150 ASSAY OF AMYLASE: CPT

## 2019-06-10 PROCEDURE — 36415 COLL VENOUS BLD VENIPUNCTURE: CPT

## 2019-06-10 NOTE — PROGRESS NOTES
Gustavo Ramos is here at [address] [facility] for:    Chief Complaint   Patient presents with    Routine Prenatal Visit     C/O off and on dizzy spells. Feels hot. Hands tingle. Estimated Due Date: Estimated Date of Delivery: 19    OB History    Para Term  AB Living   1 0 0 0 0     SAB TAB Ectopic Molar Multiple Live Births   0 0 0 0          # Outcome Date GA Lbr Messi/2nd Weight Sex Delivery Anes PTL Lv   1 Current                 Past Medical History:   Diagnosis Date    Diabetes mellitus ([facility] [address]        Past Surgical History:   Procedure Laterality Date    KNEE ARTHROSCOPY Right     KNEE ARTHROSCOPY Right 2016    Medial Menisectomy    KNEE SURGERY  [id number]       Social History     Tobacco Use   Smoking Status Never Smoker   Smokeless Tobacco Never Used        Social History     Substance and Sexual Activity   Alcohol Use No       No results found for this visit on 06/10/19. Vitals:  /64   Wt 149 lb 12.8 oz (67.9 kg)   LMP 2018   BMI 23.46 kg/m²   Estimated body mass index is 23.46 kg/m² as calculated from the following:    Height as of 19: 5' 7\" (1.702 m). Weight as of this encounter: 149 lb 12.8 oz (67.9 kg). HPI: here for routine ob visit and f/u to syncopal episode x1 and near syncopal recurrent episodes. Had started following GDM diet and began having low blood sugars. All work up so far in ED and on OB has been negative. Is not following diabetic diet now and blood sugars remain low (mostly in 60s). Awaiting holter monitor results. PT denies fever, chills, nausea and vomiting       Abdomen: enlarged, gravid, soft, nontender     Results reviewed today:    No results found for this visit on 06/10/19. See prenatal vital sign section and fetal assessment section    ASSESSMENT & Plan    Diagnosis Orders   1. Encounter for supervision of normal first pregnancy in third trimester     2. 32 weeks gestation of pregnancy     3.  Syncope, unspecified syncope type  Amy Winter MD, Cardiology, [place]             I am having Joann Vieira maintain her ketorolac, traMADol, blood glucose monitor supplies, FREESTYLE LITE, FREESTYLE LANCETS, Prenatal Vit-Fe Fumarate-FA (PRENATAL VITAMIN PO), vitamin B-6, and (Doxylamine Succinate, Sleep, (UNISOM PO)). Return for keep appt 6/24. There are no Patient Instructions on file for this visit.           Sapna Johnson,6/10/2019 8:50 AM

## 2019-06-10 NOTE — TELEPHONE ENCOUNTER
She means for the GDM diagnosis I'm assuming. Yes we can do that.   i'd go to [place] with her because she lives in [facility]

## 2019-06-19 ENCOUNTER — OFFICE VISIT (OUTPATIENT)
Dept: CARDIOLOGY | Age: 19
End: 2019-06-19
Payer: OTHER GOVERNMENT

## 2019-06-19 VITALS
HEART RATE: 94 BPM | OXYGEN SATURATION: 98 % | DIASTOLIC BLOOD PRESSURE: 76 MMHG | RESPIRATION RATE: 18 BRPM | WEIGHT: 151 LBS | SYSTOLIC BLOOD PRESSURE: 116 MMHG | BODY MASS INDEX: 22.88 KG/M2 | HEIGHT: 68 IN

## 2019-06-19 DIAGNOSIS — R55 SYNCOPE, UNSPECIFIED SYNCOPE TYPE: Primary | ICD-10-CM

## 2019-06-19 DIAGNOSIS — I49.3 FREQUENT UNIFOCAL PVCS: ICD-10-CM

## 2019-06-19 DIAGNOSIS — R94.31 ABNORMAL HOLTER EXAM: ICD-10-CM

## 2019-06-19 DIAGNOSIS — O24.410 DIET CONTROLLED GESTATIONAL DIABETES MELLITUS (GDM) IN THIRD TRIMESTER: ICD-10-CM

## 2019-06-19 PROCEDURE — 99243 OFF/OP CNSLTJ NEW/EST LOW 30: CPT | Performed by: INTERNAL MEDICINE

## 2019-06-19 NOTE — PROGRESS NOTES
Chapincito Frazier am scribing for and in the presence of Mable Lo MD, F.A.C.C..    Patient: Christiano Mendez  : 2000  Date of Visit: 2019    REASON FOR VISIT / CONSULTATION: Establish Cardiologist (Umairakanksha Will Pt is here to est cardiac care with her mother and grandmother. She states about 3 weeks ago she completly blacked  she felt hot and dizzy before, while at work and was caught by co worker that helped her to floor did not remember what happened. She is about 34 weeks pregnant. She has some SOB and lightheaded/dizzy shge also is having trouble with sugars. Denies: CP, palpitations)      History of Present Illness:        Dear No primary care provider on file. I had the pleasure of seeing  Christiano Mendez in my office today. Ms. Jessi Carlson is a 19 y.o. female with a recent history of syncope. Ms. Jessi Carlson is here to establish cardiac care due to syncope. She states while at work about 3 weeks ago after testing sugar she felt dizzy and hot and then passed out, her co-worker was able to get her to ground. She was out a minute or so when she wake up she did not remember what has happened but she was aware of herself and surroundings. No seizure-like activities. No loss of bowel or bladder continence. No tongue biting. EMS was called she then was evaluated and released once she felt better. She is still having dizzy spells at least twice a week and knows now to sit down. She denies any chest pain, pressure or tightness. No significant shortness of breath. No palpitations. She thinks she drink enough fluids. No significant family history of heart disease. She does not drink coffee. Occasional soda but she is now keeping it to the minimal.    She is about 34 weeks pregnant and does have diabetes. She reports only has heart disease in her grandfather. Nothing in in her immediate family members.     She reports having a relatively good exercise tolerance and denied any current or recent chest pain, shortness of breath, abdominal pain, bleeding problems, problems with her medications or any other concerns at this time. Holter done 6/6/2019 Rhythm was sinus. Average HR 85 bpm.  Frequent isolated PVCs, PVCs burden 6%. No sustained atrial or ventricular arrhythmia. Nothing to explain syncope. PAST MEDICAL HISTORY:         Past Medical History:   Diagnosis Date    Diabetes mellitus ([address]     History of Holter monitoring 06/06/2019    Rhythm was sinus. Average HR 80 bpm. Rare premature supraventricular ectopic beats. Very frequent premature ventricular ectopic beats. Very frequent premature ventricular complexes, mainly isolated w/ one ventricular couplet. no ventricular runs. CURRENT ALLERGIES: Norco [hydrocodone-acetaminophen] REVIEW OF SYSTEMS: 14 systems were reviewed. Pertinent positives and negatives as above, all else negative. Past Surgical History:   Procedure Laterality Date    KNEE ARTHROSCOPY Right     KNEE ARTHROSCOPY Right 11/04/2016    Medial Menisectomy    KNEE SURGERY  [id number]    Social History:  Social History     Tobacco Use    Smoking status: Never Smoker    Smokeless tobacco: Never Used   Substance Use Topics    Alcohol use: No    Drug use: No        CURRENT MEDICATIONS:        Outpatient Medications Marked as Taking for the 6/19/19 encounter (Office Visit) with Raghav Ford MD   Medication Sig Dispense Refill    FREESTYLE LITE strip USE 1 TEST STRIP TO TEST BLOOD SUGAR THREE TIMES DAILY 200 strip 6    FREESTYLE LANCETS MISC 200 Int'l Units by Does not apply route 4 times daily 200 each 6    blood glucose monitor supplies One glucometer, test strips, lancets - per insurance coverage. Patient to test blood sugar fasting in am and 1 hour post prandial three times daily.  100 each 6    Prenatal Vit-Fe Fumarate-FA (PRENATAL VITAMIN PO) Take by mouth         FAMILY HISTORY: family history includes Heart Surgery in her maternal grandfather; Hypertension in her maternal grandfather; Maxine De Luna in her maternal grandfather. Physical Examination:     /76 (Site: Right Upper Arm, Position: Standing, Cuff Size: Medium Adult)   Pulse 94   Resp 18   Ht 5' 8\" (1.727 m)   Wt 151 lb (68.5 kg)   LMP 09/30/2018   SpO2 98%   BMI 22.96 kg/m²  Body mass index is 22.96 kg/m². Constitutional: She appeared oriented to person and place. She appears well-developed and well-nourished. In no acute distress. HEENT: Normocephalic and atraumatic. No JVD present. Carotid bruit is not present. No mass and no thyromegaly present. No lymphadenopathy noted. Cardiovascular: Normal rate, regular rhythm, normal heart sounds. Exam reveals no gallop and no friction rubs. No murmur was heard. Pulmonary/Chest: Effort normal and breath sounds normal. No respiratory distress. She has no wheezes, rhonchi or rales. Abdominal: Soft, non-tender. She exhibits no organomegaly, mass or bruit. Extremities: None. No cyanosis or clubbing. 2+ radial and carotid pulses. Distal extremity pulses: 2+ bilaterally. Neurological: Alertness and orientation as per Constitutional exam. No evidence of gross cranial nerve deficit. Coordination appeared normal.   Skin: Skin is warm and dry. There is no rash or diaphoresis. Psychiatric: She has a normal mood and affect. Her speech is normal and behavior is normal.      MOST RECENT LABS ON RECORD:   Lab Results   Component Value Date    WBC 6.9 06/06/2019    HGB 10.5 (L) 06/06/2019    HCT 31.5 (L) 06/06/2019     06/06/2019    ALT 13 06/06/2019    AST 19 06/06/2019     06/06/2019    K 4.2 06/06/2019     06/06/2019    CREATININE 0.76 06/06/2019    BUN 10 06/06/2019    CO2 25 06/06/2019    TSH 1.87 06/06/2019    GLUF 69 05/15/2019       ASSESSMENT:     1. Syncope, unspecified syncope type    2. Diet controlled gestational diabetes mellitus (GDM) in third trimester    3. Abnormal Holter exam    4.  Frequent unifocal PVCs         PLAN: · History of syncope  · I think her most recent syncopal episode is secondary to low blood sugar. · She has frequent isolated PVCs on Holter but nothing to explain syncope. · Nonpharmacologic counseling: Because of her condition, I reminded her to try and keep herself well-hydrated and to take extra time when moving from laying to sitting, sitting to standing and standing to walking. I also explained to her to help improve her symptoms she should include 3 g sodium diet, 1 or 2 L of sports drinks daily, knee-high compressions stockings. · Additional Testing List: I ordered a echocardiogram to better assess for the etiology of this problem and to help guide future management   · ECG is very much unremarkable. Sinus rhythm with normal QTc interval.  No delta wave, no epsilon wave or Brugada type pattern. · No family history of premature heart disease, syncope or sudden death. · Controlled of Gestational diabetes:  · Continue current treatment. Abnormal Holter  · I did explain to her possible etiology of her isolated PVCs. · These can be idiopathic, stress related, they can also be caused by stimulants like caffeinated beverages. · It is very important to rule out structural heart disease. · Currently feeling okay, no palpitation or significant dizziness. No further episodes of loss of consciousness. · Continue conservative management and follow-up echo. · We will reevaluate after delivery. Finally, I recommended that she continue her other medications and follow up with you as previously scheduled. FOLLOW UP:   I told Ms. Vieira to call my office if she had any problems, but otherwise I asked her to Return in about 7 weeks (around 8/7/2019). However, I would be happy to see her sooner should the need arise. Sincerely,  William Simmons MD, F.A.C.C.   [facility] Cardiology Specialist    [address] [facility], [address] [place] [address]  Phone: [phone], Fax: [phone]     I believe that the risk of significant morbidity and mortality related to the patient's current medical conditions are: low-intermediate. 30 minutes were spent with the patient and all of their questions were answered. The documentation recorded by the scribe, accurately and completely reflects the services I personally performed and the decisions made by me. Bc Cazares MD, F.A.C.C.  June 19, 2019

## 2019-06-19 NOTE — PATIENT INSTRUCTIONS
SURVEY:    You may be receiving a survey from [employer] regarding your visit today. Please complete the survey to enable us to provide the highest quality of care to you and your family. If you cannot score us a very good on any question, please call the office to discuss how we could have made your experience a very good one. Thank you.

## 2019-06-24 ENCOUNTER — ROUTINE PRENATAL (OUTPATIENT)
Dept: OBGYN | Age: 19
End: 2019-06-24
Payer: OTHER GOVERNMENT

## 2019-06-24 ENCOUNTER — HOSPITAL ENCOUNTER (OUTPATIENT)
Age: 19
Setting detail: SPECIMEN
Discharge: HOME | End: 2019-06-24
Payer: OTHER GOVERNMENT

## 2019-06-24 VITALS — DIASTOLIC BLOOD PRESSURE: 70 MMHG | BODY MASS INDEX: 23.11 KG/M2 | SYSTOLIC BLOOD PRESSURE: 110 MMHG | WEIGHT: 152 LBS

## 2019-06-24 DIAGNOSIS — O24.419 GESTATIONAL DIABETES MELLITUS (GDM) IN THIRD TRIMESTER, GESTATIONAL DIABETES METHOD OF CONTROL UNSPECIFIED: ICD-10-CM

## 2019-06-24 DIAGNOSIS — O92.70 LACTATION DISORDER: ICD-10-CM

## 2019-06-24 DIAGNOSIS — Z3A.34 34 WEEKS GESTATION OF PREGNANCY: ICD-10-CM

## 2019-06-24 DIAGNOSIS — O21.9 NAUSEA AND VOMITING IN PREGNANCY: ICD-10-CM

## 2019-06-24 DIAGNOSIS — O24.419 GESTATIONAL DIABETES MELLITUS (GDM) IN THIRD TRIMESTER, GESTATIONAL DIABETES METHOD OF CONTROL UNSPECIFIED: Primary | ICD-10-CM

## 2019-06-24 LAB
ABDOMINAL CIRCUMFERENCE: 30.2 CM
BIPARIETAL DIAMETER: 8.7 CM
ESTIMATED FETAL WEIGHT: 2356 GRAMS
FEMORAL DIAMETER: NORMAL CM
HC/AC: 1.04
HEAD CIRCUMFERENCE: 31.4 CM

## 2019-06-24 PROCEDURE — 82985 ASSAY OF GLYCATED PROTEIN: CPT

## 2019-06-24 PROCEDURE — 0502F SUBSEQUENT PRENATAL CARE: CPT | Performed by: ADVANCED PRACTICE MIDWIFE

## 2019-06-24 PROCEDURE — 36415 COLL VENOUS BLD VENIPUNCTURE: CPT | Performed by: ADVANCED PRACTICE MIDWIFE

## 2019-06-24 PROCEDURE — 76816 OB US FOLLOW-UP PER FETUS: CPT | Performed by: OBSTETRICS & GYNECOLOGY

## 2019-06-24 RX ORDER — BREAST PUMP
EACH MISCELLANEOUS
Qty: 1 EACH | Refills: 0 | Status: SHIPPED | OUTPATIENT
Start: 2019-06-24 | End: 2019-07-08 | Stop reason: SDUPTHER

## 2019-06-24 RX ORDER — BREAST PUMP
EACH MISCELLANEOUS
Qty: 1 EACH | Refills: 0
Start: 2019-06-24 | End: 2019-07-08 | Stop reason: ALTCHOICE

## 2019-06-24 RX ORDER — ONDANSETRON 4 MG/1
4 TABLET, FILM COATED ORAL EVERY 8 HOURS PRN
Qty: 30 TABLET | Refills: 0 | Status: ON HOLD | OUTPATIENT
Start: 2019-06-24 | End: 2019-07-05 | Stop reason: HOSPADM

## 2019-06-24 NOTE — PROGRESS NOTES
Denisse Vogel is here at 34w3d for:    Chief Complaint   Patient presents with    Routine Prenatal Visit     Follow up in house ultrasound. Patient saw Dr. Ishan Arcos in [place] and was not pleased. Patient said he advised her her blood sugars were ok if though then sometimes run in the 60's. Patient still feels hot and dizzy at times. Random vomiting. Patient has echocardiagram tomorrow. Estimated Due Date: Estimated Date of Delivery: 19    OB History    Para Term  AB Living   1 0 0 0 0     SAB TAB Ectopic Molar Multiple Live Births   0 0 0 0          # Outcome Date GA Lbr Messi/2nd Weight Sex Delivery Anes PTL Lv   1 Current                 Past Medical History:   Diagnosis Date    Diabetes mellitus ([facility] [address]     History of Holter monitoring 2019    Rhythm was sinus. Average HR 80 bpm. Rare premature supraventricular ectopic beats. Very frequent premature ventricular ectopic beats. Very frequent premature ventricular complexes, mainly isolated w/ one ventricular couplet. no ventricular runs. Past Surgical History:   Procedure Laterality Date    KNEE ARTHROSCOPY Right     KNEE ARTHROSCOPY Right 2016    Medial Menisectomy    KNEE SURGERY  [id number]       Social History     Tobacco Use   Smoking Status Never Smoker   Smokeless Tobacco Never Used        Social History     Substance and Sexual Activity   Alcohol Use No       Results for orders placed or performed in visit on 19   US OB Follow Up Transabdominal Approach   Result Value Ref Range    Biparietal Diameter 8.7 cm    Abdominal Circumference 30.2 cm    Femoral Diameter  cm    Head Circumference 31.4 cm    HC/AC 1.04     Estimated Fetal Weight 2,356 grams       Vitals:  /70   Wt 152 lb (68.9 kg)   LMP 2018   BMI 23.11 kg/m²   Estimated body mass index is 23.11 kg/m² as calculated from the following:    Height as of 19: 5' 8\" (1.727 m). Weight as of this encounter: 152 lb (68.9 kg).       HPI: here for routine ob appt and growth sono; has ongoing issues with hypoglycemia and syncopal episodes after dx of GDM, not on medication/ diet control     PT denies fever, chills, nausea and vomiting       Abdomen: enlarged, gravid, soft, nontender     Results reviewed today:    Results for orders placed or performed in visit on 06/24/19    OB Follow Up Transabdominal Approach   Result Value Ref Range    Biparietal Diameter 8.7 cm    Abdominal Circumference 30.2 cm    Femoral Diameter  cm    Head Circumference 31.4 cm    HC/AC 1.04     Estimated Fetal Weight 2,356 grams       See prenatal vital sign section and fetal assessment section    ASSESSMENT & Plan    Diagnosis Orders   1. Gestational diabetes mellitus (GDM) in third trimester, gestational diabetes method of control unspecified  US OB Follow Up Transabdominal Approach   2. 34 weeks gestation of pregnancy  US OB Follow Up Transabdominal Approach     Await consults from endocrinology and cardiology; has order for fructosamine, will draw and cc to Dr. Krista Solares        I am having Devon Soni. Dariel maintain her ketorolac, traMADol, blood glucose monitor supplies, FREESTYLE LITE, FREESTYLE LANCETS, Prenatal Vit-Fe Fumarate-FA (PRENATAL VITAMIN PO), vitamin B-6, and (Doxylamine Succinate, Sleep, (UNISOM PO)). Return in about 2 weeks (around 7/8/2019) for ob and 4 weeks with growth sono. There are no Patient Instructions on file for this visit.           Sapna Johnson,6/24/2019 9:13 AM

## 2019-06-25 ENCOUNTER — HOSPITAL ENCOUNTER (OUTPATIENT)
Dept: NON INVASIVE DIAGNOSTICS | Age: 19
Discharge: HOME | End: 2019-06-25
Payer: OTHER GOVERNMENT

## 2019-06-25 DIAGNOSIS — O24.410 DIET CONTROLLED GESTATIONAL DIABETES MELLITUS (GDM) IN THIRD TRIMESTER: ICD-10-CM

## 2019-06-25 DIAGNOSIS — R55 SYNCOPE, UNSPECIFIED SYNCOPE TYPE: ICD-10-CM

## 2019-06-25 LAB
LV EF: 60 %
LVEF MODALITY: NORMAL

## 2019-06-25 PROCEDURE — 93306 TTE W/DOPPLER COMPLETE: CPT

## 2019-06-26 ENCOUNTER — TELEPHONE (OUTPATIENT)
Dept: CARDIOLOGY | Age: 19
End: 2019-06-26

## 2019-06-26 LAB — FRUCTOSAMINE: 252 UMOL/L (ref 170–285)

## 2019-06-26 NOTE — TELEPHONE ENCOUNTER
----- Message from Zenaida Shipman MD sent at 6/25/2019 10:15 PM EDT -----  Test result normal.  No further action needed.

## 2019-07-01 ENCOUNTER — TELEPHONE (OUTPATIENT)
Dept: OBGYN | Age: 19
End: 2019-07-01

## 2019-07-02 ENCOUNTER — ROUTINE PRENATAL (OUTPATIENT)
Dept: OBGYN | Age: 19
End: 2019-07-02
Payer: OTHER GOVERNMENT

## 2019-07-02 ENCOUNTER — HOSPITAL ENCOUNTER (OUTPATIENT)
Age: 19
Setting detail: SPECIMEN
Discharge: HOME | End: 2019-07-02
Payer: OTHER GOVERNMENT

## 2019-07-02 VITALS — DIASTOLIC BLOOD PRESSURE: 74 MMHG | BODY MASS INDEX: 23.72 KG/M2 | SYSTOLIC BLOOD PRESSURE: 120 MMHG | WEIGHT: 156 LBS

## 2019-07-02 DIAGNOSIS — Z34.03 ENCOUNTER FOR SUPERVISION OF NORMAL FIRST PREGNANCY IN THIRD TRIMESTER: Primary | ICD-10-CM

## 2019-07-02 DIAGNOSIS — Z3A.35 35 WEEKS GESTATION OF PREGNANCY: ICD-10-CM

## 2019-07-02 LAB
ABDOMINAL CIRCUMFERENCE: NORMAL CM
BIPARIETAL DIAMETER: NORMAL CM
ESTIMATED FETAL WEIGHT: NORMAL GRAMS
FEMORAL DIAMETER: NORMAL CM
HC/AC: NORMAL
HEAD CIRCUMFERENCE: NORMAL CM

## 2019-07-02 PROCEDURE — 87081 CULTURE SCREEN ONLY: CPT

## 2019-07-02 PROCEDURE — 0502F SUBSEQUENT PRENATAL CARE: CPT | Performed by: ADVANCED PRACTICE MIDWIFE

## 2019-07-02 PROCEDURE — 76815 OB US LIMITED FETUS(S): CPT | Performed by: OBSTETRICS & GYNECOLOGY

## 2019-07-02 NOTE — PROGRESS NOTES
Alyse Leslie is here at 35w4d for:    Chief Complaint   Patient presents with    Routine Prenatal Visit     Concerns with large amounts of mucousy vaginal discharge. Two episodes of feeling faint and dizzy, felt like she was going to pass out. Estimated Due Date: Estimated Date of Delivery: 19    OB History    Para Term  AB Living   1 0 0 0 0     SAB TAB Ectopic Molar Multiple Live Births   0 0 0 0          # Outcome Date GA Lbr Messi/2nd Weight Sex Delivery Anes PTL Lv   1 Current                 Past Medical History:   Diagnosis Date    Diabetes mellitus ([facility] [address]     History of Holter monitoring 2019    Rhythm was sinus. Average HR 80 bpm. Rare premature supraventricular ectopic beats. Very frequent premature ventricular ectopic beats. Very frequent premature ventricular complexes, mainly isolated w/ one ventricular couplet. no ventricular runs. Past Surgical History:   Procedure Laterality Date    KNEE ARTHROSCOPY Right     KNEE ARTHROSCOPY Right 2016    Medial Menisectomy    KNEE SURGERY  [id number]       Social History     Tobacco Use   Smoking Status Never Smoker   Smokeless Tobacco Never Used        Social History     Substance and Sexual Activity   Alcohol Use No       No results found for this visit on 19. Vitals:  /74   Wt 156 lb (70.8 kg)   LMP 2018   BMI 23.72 kg/m²   Estimated body mass index is 23.72 kg/m² as calculated from the following:    Height as of 19: 5' 8\" (1.727 m). Weight as of this encounter: 156 lb (70.8 kg). HPI: here for routine appt, c/o mucous discharge and \"ran down leg once\"     PT denies fever, chills, nausea and vomiting       Abdomen: enlarged, gravid, sof,t nontender     Results reviewed today:    No results found for this visit on 19. See prenatal vital sign section and fetal assessment section    ASSESSMENT & Plan    Diagnosis Orders   1.  Encounter for supervision of normal first pregnancy

## 2019-07-03 ENCOUNTER — HOSPITAL ENCOUNTER (INPATIENT)
Age: 19
LOS: 2 days | Discharge: HOME | End: 2019-07-05
Attending: ADVANCED PRACTICE MIDWIFE | Admitting: ADVANCED PRACTICE MIDWIFE
Payer: OTHER GOVERNMENT

## 2019-07-03 ENCOUNTER — ANESTHESIA (OUTPATIENT)
Dept: LABOR AND DELIVERY | Age: 19
End: 2019-07-03
Payer: OTHER GOVERNMENT

## 2019-07-03 ENCOUNTER — ANESTHESIA EVENT (OUTPATIENT)
Dept: LABOR AND DELIVERY | Age: 19
End: 2019-07-03
Payer: OTHER GOVERNMENT

## 2019-07-03 LAB
ABSOLUTE EOS #: 0.12 K/UL (ref 0–0.44)
ABSOLUTE IMMATURE GRANULOCYTE: 0.04 K/UL (ref 0–0.3)
ABSOLUTE LYMPH #: 1.15 K/UL (ref 1.2–5.2)
ABSOLUTE MONO #: 0.58 K/UL (ref 0.1–1.4)
AMPHETAMINE SCREEN URINE: NEGATIVE
BARBITURATE SCREEN URINE: NEGATIVE
BASOPHILS # BLD: 1 % (ref 0–2)
BASOPHILS ABSOLUTE: 0.05 K/UL (ref 0–0.2)
BENZODIAZEPINE SCREEN, URINE: NEGATIVE
BUPRENORPHINE URINE: NEGATIVE
CANNABINOID SCREEN URINE: NEGATIVE
COCAINE METABOLITE, URINE: NEGATIVE
DIFFERENTIAL TYPE: ABNORMAL
EOSINOPHILS RELATIVE PERCENT: 2 % (ref 1–4)
HCT VFR BLD CALC: 30.6 % (ref 36.3–47.1)
HEMOGLOBIN: 10.6 G/DL (ref 11.9–15.1)
IMMATURE GRANULOCYTES: 1 %
LYMPHOCYTES # BLD: 16 % (ref 25–45)
MCH RBC QN AUTO: 33 PG (ref 25.2–33.5)
MCHC RBC AUTO-ENTMCNC: 34.6 G/DL (ref 28.4–34.8)
MCV RBC AUTO: 95.3 FL (ref 82.6–102.9)
MDMA URINE: NORMAL
METHADONE SCREEN, URINE: NEGATIVE
METHAMPHETAMINE, URINE: NEGATIVE
MONOCYTES # BLD: 8 % (ref 2–8)
NRBC AUTOMATED: 0 PER 100 WBC
OPIATES, URINE: NEGATIVE
OXYCODONE SCREEN URINE: NEGATIVE
PDW BLD-RTO: 11.9 % (ref 11.8–14.4)
PHENCYCLIDINE, URINE: NEGATIVE
PLATELET # BLD: 156 K/UL (ref 138–453)
PLATELET ESTIMATE: ABNORMAL
PMV BLD AUTO: 9.9 FL (ref 8.1–13.5)
PROPOXYPHENE, URINE: NEGATIVE
RBC # BLD: 3.21 M/UL (ref 3.95–5.11)
RBC # BLD: ABNORMAL 10*6/UL
SEG NEUTROPHILS: 72 % (ref 34–64)
SEGMENTED NEUTROPHILS ABSOLUTE COUNT: 5.36 K/UL (ref 1.8–8)
TEST INFORMATION: NORMAL
TRICYCLIC ANTIDEPRESSANTS, UR: NEGATIVE
WBC # BLD: 7.3 K/UL (ref 4.5–13.5)
WBC # BLD: ABNORMAL 10*3/UL

## 2019-07-03 PROCEDURE — 3700000025 EPIDURAL BLOCK: Performed by: NURSE ANESTHETIST, CERTIFIED REGISTERED

## 2019-07-03 PROCEDURE — 2580000003 HC RX 258: Performed by: ADVANCED PRACTICE MIDWIFE

## 2019-07-03 PROCEDURE — 6360000002 HC RX W HCPCS: Performed by: NURSE ANESTHETIST, CERTIFIED REGISTERED

## 2019-07-03 PROCEDURE — 88307 TISSUE EXAM BY PATHOLOGIST: CPT

## 2019-07-03 PROCEDURE — 3E0R3BZ INTRODUCTION OF ANESTHETIC AGENT INTO SPINAL CANAL, PERCUTANEOUS APPROACH: ICD-10-PCS | Performed by: ADVANCED PRACTICE MIDWIFE

## 2019-07-03 PROCEDURE — 6370000000 HC RX 637 (ALT 250 FOR IP): Performed by: ADVANCED PRACTICE MIDWIFE

## 2019-07-03 PROCEDURE — 0HQ9XZZ REPAIR PERINEUM SKIN, EXTERNAL APPROACH: ICD-10-PCS | Performed by: ADVANCED PRACTICE MIDWIFE

## 2019-07-03 PROCEDURE — 1220000000 HC SEMI PRIVATE OB R&B

## 2019-07-03 PROCEDURE — 80306 DRUG TEST PRSMV INSTRMNT: CPT

## 2019-07-03 PROCEDURE — 3E0R33Z INTRODUCTION OF ANTI-INFLAMMATORY INTO SPINAL CANAL, PERCUTANEOUS APPROACH: ICD-10-PCS | Performed by: ADVANCED PRACTICE MIDWIFE

## 2019-07-03 PROCEDURE — 59400 OBSTETRICAL CARE: CPT | Performed by: ADVANCED PRACTICE MIDWIFE

## 2019-07-03 PROCEDURE — 6360000002 HC RX W HCPCS: Performed by: ADVANCED PRACTICE MIDWIFE

## 2019-07-03 PROCEDURE — 85025 COMPLETE CBC W/AUTO DIFF WBC: CPT

## 2019-07-03 PROCEDURE — 2500000003 HC RX 250 WO HCPCS: Performed by: NURSE ANESTHETIST, CERTIFIED REGISTERED

## 2019-07-03 RX ORDER — IBUPROFEN 800 MG/1
800 TABLET ORAL EVERY 8 HOURS
Status: DISCONTINUED | OUTPATIENT
Start: 2019-07-03 | End: 2019-07-05 | Stop reason: HOSPADM

## 2019-07-03 RX ORDER — SODIUM CHLORIDE 0.9 % (FLUSH) 0.9 %
10 SYRINGE (ML) INJECTION PRN
Status: DISCONTINUED | OUTPATIENT
Start: 2019-07-03 | End: 2019-07-05 | Stop reason: HOSPADM

## 2019-07-03 RX ORDER — LIDOCAINE HYDROCHLORIDE 10 MG/ML
30 INJECTION, SOLUTION EPIDURAL; INFILTRATION; INTRACAUDAL; PERINEURAL PRN
Status: DISCONTINUED | OUTPATIENT
Start: 2019-07-03 | End: 2019-07-03

## 2019-07-03 RX ORDER — ACETAMINOPHEN 325 MG/1
650 TABLET ORAL EVERY 4 HOURS PRN
Status: DISCONTINUED | OUTPATIENT
Start: 2019-07-03 | End: 2019-07-03

## 2019-07-03 RX ORDER — FENTANYL CITRATE 50 UG/ML
INJECTION, SOLUTION INTRAMUSCULAR; INTRAVENOUS PRN
Status: DISCONTINUED | OUTPATIENT
Start: 2019-07-03 | End: 2019-07-03 | Stop reason: SDUPTHER

## 2019-07-03 RX ORDER — METHYLERGONOVINE MALEATE 0.2 MG/ML
200 INJECTION INTRAVENOUS
Status: ACTIVE | OUTPATIENT
Start: 2019-07-03 | End: 2019-07-03

## 2019-07-03 RX ORDER — CARBOPROST TROMETHAMINE 250 UG/ML
250 INJECTION, SOLUTION INTRAMUSCULAR PRN
Status: DISCONTINUED | OUTPATIENT
Start: 2019-07-03 | End: 2019-07-03

## 2019-07-03 RX ORDER — SODIUM CHLORIDE, SODIUM LACTATE, POTASSIUM CHLORIDE, CALCIUM CHLORIDE 600; 310; 30; 20 MG/100ML; MG/100ML; MG/100ML; MG/100ML
INJECTION, SOLUTION INTRAVENOUS PRN
Status: DISCONTINUED | OUTPATIENT
Start: 2019-07-03 | End: 2019-07-03

## 2019-07-03 RX ORDER — LIDOCAINE HYDROCHLORIDE AND EPINEPHRINE 20; 5 MG/ML; UG/ML
INJECTION, SOLUTION EPIDURAL; INFILTRATION; INTRACAUDAL; PERINEURAL PRN
Status: DISCONTINUED | OUTPATIENT
Start: 2019-07-03 | End: 2019-07-03 | Stop reason: SDUPTHER

## 2019-07-03 RX ORDER — SODIUM CHLORIDE 0.9 % (FLUSH) 0.9 %
10 SYRINGE (ML) INJECTION EVERY 12 HOURS SCHEDULED
Status: DISCONTINUED | OUTPATIENT
Start: 2019-07-03 | End: 2019-07-05 | Stop reason: HOSPADM

## 2019-07-03 RX ORDER — ONDANSETRON 2 MG/ML
4 INJECTION INTRAMUSCULAR; INTRAVENOUS EVERY 6 HOURS PRN
Status: DISCONTINUED | OUTPATIENT
Start: 2019-07-03 | End: 2019-07-03

## 2019-07-03 RX ORDER — METHYLERGONOVINE MALEATE 0.2 MG/ML
200 INJECTION INTRAVENOUS PRN
Status: DISCONTINUED | OUTPATIENT
Start: 2019-07-03 | End: 2019-07-03

## 2019-07-03 RX ORDER — DOCUSATE SODIUM 100 MG/1
100 CAPSULE, LIQUID FILLED ORAL 2 TIMES DAILY PRN
Status: DISCONTINUED | OUTPATIENT
Start: 2019-07-03 | End: 2019-07-05 | Stop reason: HOSPADM

## 2019-07-03 RX ORDER — NALBUPHINE HCL 10 MG/ML
10 AMPUL (ML) INJECTION
Status: DISCONTINUED | OUTPATIENT
Start: 2019-07-03 | End: 2019-07-03

## 2019-07-03 RX ORDER — ACETAMINOPHEN 325 MG/1
650 TABLET ORAL EVERY 4 HOURS PRN
Status: DISCONTINUED | OUTPATIENT
Start: 2019-07-03 | End: 2019-07-05 | Stop reason: HOSPADM

## 2019-07-03 RX ORDER — LIDOCAINE HYDROCHLORIDE AND EPINEPHRINE 15; 5 MG/ML; UG/ML
INJECTION, SOLUTION EPIDURAL PRN
Status: DISCONTINUED | OUTPATIENT
Start: 2019-07-03 | End: 2019-07-03 | Stop reason: SDUPTHER

## 2019-07-03 RX ORDER — MISOPROSTOL 100 UG/1
900 TABLET ORAL PRN
Status: DISCONTINUED | OUTPATIENT
Start: 2019-07-03 | End: 2019-07-03

## 2019-07-03 RX ORDER — SODIUM CHLORIDE 0.9 % (FLUSH) 0.9 %
10 SYRINGE (ML) INJECTION EVERY 12 HOURS SCHEDULED
Status: DISCONTINUED | OUTPATIENT
Start: 2019-07-03 | End: 2019-07-03

## 2019-07-03 RX ORDER — LANOLIN 100 %
OINTMENT (GRAM) TOPICAL PRN
Status: DISCONTINUED | OUTPATIENT
Start: 2019-07-03 | End: 2019-07-05 | Stop reason: HOSPADM

## 2019-07-03 RX ORDER — SEVOFLURANE 250 ML/250ML
1 LIQUID RESPIRATORY (INHALATION) CONTINUOUS PRN
Status: DISCONTINUED | OUTPATIENT
Start: 2019-07-03 | End: 2019-07-03

## 2019-07-03 RX ORDER — ROPIVACAINE HYDROCHLORIDE 2 MG/ML
INJECTION, SOLUTION EPIDURAL; INFILTRATION; PERINEURAL CONTINUOUS PRN
Status: DISCONTINUED | OUTPATIENT
Start: 2019-07-03 | End: 2019-07-03 | Stop reason: SDUPTHER

## 2019-07-03 RX ORDER — SODIUM CHLORIDE 0.9 % (FLUSH) 0.9 %
10 SYRINGE (ML) INJECTION PRN
Status: DISCONTINUED | OUTPATIENT
Start: 2019-07-03 | End: 2019-07-03

## 2019-07-03 RX ORDER — LIDOCAINE HYDROCHLORIDE 20 MG/ML
INJECTION, SOLUTION EPIDURAL; INFILTRATION; INTRACAUDAL; PERINEURAL PRN
Status: DISCONTINUED | OUTPATIENT
Start: 2019-07-03 | End: 2019-07-03 | Stop reason: SDUPTHER

## 2019-07-03 RX ADMIN — BENZOCAINE AND LEVOMENTHOL: 200; 5 SPRAY TOPICAL at 21:44

## 2019-07-03 RX ADMIN — LIDOCAINE HYDROCHLORIDE,EPINEPHRINE BITARTRATE 5 ML: 15; .005 INJECTION, SOLUTION EPIDURAL; INFILTRATION; INTRACAUDAL; PERINEURAL at 12:13

## 2019-07-03 RX ADMIN — Medication 40 EACH: at 21:44

## 2019-07-03 RX ADMIN — SODIUM CHLORIDE, POTASSIUM CHLORIDE, SODIUM LACTATE AND CALCIUM CHLORIDE: 600; 310; 30; 20 INJECTION, SOLUTION INTRAVENOUS at 13:00

## 2019-07-03 RX ADMIN — LIDOCAINE HYDROCHLORIDE,EPINEPHRINE BITARTRATE 2 ML: 20; .005 INJECTION, SOLUTION EPIDURAL; INFILTRATION; INTRACAUDAL; PERINEURAL at 13:20

## 2019-07-03 RX ADMIN — LIDOCAINE HYDROCHLORIDE 3 ML: 20 INJECTION, SOLUTION EPIDURAL; INFILTRATION; INTRACAUDAL at 12:17

## 2019-07-03 RX ADMIN — LIDOCAINE HYDROCHLORIDE,EPINEPHRINE BITARTRATE 3 ML: 20; .005 INJECTION, SOLUTION EPIDURAL; INFILTRATION; INTRACAUDAL; PERINEURAL at 13:18

## 2019-07-03 RX ADMIN — Medication 1 MILLI-UNITS/MIN: at 10:06

## 2019-07-03 RX ADMIN — DEXTROSE MONOHYDRATE 5 MILLION UNITS: 50 INJECTION, SOLUTION INTRAVENOUS at 06:24

## 2019-07-03 RX ADMIN — DEXTROSE MONOHYDRATE 2.5 MILLION UNITS: 50 INJECTION, SOLUTION INTRAVENOUS at 10:32

## 2019-07-03 RX ADMIN — LIDOCAINE HYDROCHLORIDE 2 ML: 20 INJECTION, SOLUTION EPIDURAL; INFILTRATION; INTRACAUDAL at 12:21

## 2019-07-03 RX ADMIN — FENTANYL CITRATE 100 MCG: 50 INJECTION INTRAMUSCULAR; INTRAVENOUS at 13:16

## 2019-07-03 RX ADMIN — SODIUM CHLORIDE, POTASSIUM CHLORIDE, SODIUM LACTATE AND CALCIUM CHLORIDE: 600; 310; 30; 20 INJECTION, SOLUTION INTRAVENOUS at 06:24

## 2019-07-03 RX ADMIN — IBUPROFEN 800 MG: 800 TABLET, FILM COATED ORAL at 21:08

## 2019-07-03 RX ADMIN — ROPIVACAINE HYDROCHLORIDE 12 ML/HR: 2 INJECTION, SOLUTION EPIDURAL; INFILTRATION at 12:27

## 2019-07-03 RX ADMIN — LIDOCAINE HYDROCHLORIDE,EPINEPHRINE BITARTRATE 2 ML: 20; .005 INJECTION, SOLUTION EPIDURAL; INFILTRATION; INTRACAUDAL; PERINEURAL at 13:21

## 2019-07-03 RX ADMIN — SODIUM CHLORIDE, POTASSIUM CHLORIDE, SODIUM LACTATE AND CALCIUM CHLORIDE: 600; 310; 30; 20 INJECTION, SOLUTION INTRAVENOUS at 11:30

## 2019-07-03 ASSESSMENT — PAIN - FUNCTIONAL ASSESSMENT: PAIN_FUNCTIONAL_ASSESSMENT: ACTIVITIES ARE NOT PREVENTED

## 2019-07-03 ASSESSMENT — PAIN DESCRIPTION - FREQUENCY: FREQUENCY: INTERMITTENT

## 2019-07-03 ASSESSMENT — PAIN DESCRIPTION - LOCATION: LOCATION: ABDOMEN

## 2019-07-03 ASSESSMENT — PAIN SCALES - GENERAL: PAINLEVEL_OUTOF10: 4

## 2019-07-03 ASSESSMENT — PAIN DESCRIPTION - DESCRIPTORS
DESCRIPTORS: CRAMPING

## 2019-07-03 ASSESSMENT — LIFESTYLE VARIABLES: SMOKING_STATUS: 0

## 2019-07-03 ASSESSMENT — PAIN DESCRIPTION - PROGRESSION: CLINICAL_PROGRESSION: GRADUALLY WORSENING

## 2019-07-03 ASSESSMENT — PAIN DESCRIPTION - PAIN TYPE: TYPE: ACUTE PAIN

## 2019-07-03 ASSESSMENT — PAIN DESCRIPTION - ORIENTATION: ORIENTATION: LOWER

## 2019-07-03 NOTE — PLAN OF CARE
RN  Outcome: Ongoing  7/3/2019 0523 by Isabella Arnold RN  Outcome: Ongoing     Problem:  Screening:  Goal: Ability to make informed decisions regarding treatment has improved  Description  Ability to make informed decisions regarding treatment has improved  7/3/2019 0830 by Campbell Baumann RN  Outcome: Ongoing  7/3/2019 0523 by Isabella Arnold RN  Outcome: Ongoing     Problem: Pain - Acute:  Goal: Pain level will decrease  Description  Pain level will decrease  7/3/2019 0830 by Campbell Baumann RN  Outcome: Ongoing  7/3/2019 0523 by Isabella Arnold RN  Outcome: Ongoing  Goal: Able to cope with pain  Description  Able to cope with pain  7/3/2019 0830 by Campbell Baumann RN  Outcome: Ongoing  7/3/2019 0523 by Isabella Arnold RN  Outcome: Ongoing     Problem: Tissue Perfusion - Uteroplacental, Altered:  Goal: Absence of abnormal fetal heart rate pattern  Description  Absence of abnormal fetal heart rate pattern  7/3/2019 0830 by Campbell Baumann RN  Outcome: Ongoing  7/3/2019 0523 by Isabella Arnold RN  Outcome: Ongoing     Problem: Urinary Retention:  Goal: Experiences of bladder distention will decrease  Description  Experiences of bladder distention will decrease  7/3/2019 0830 by Campbell Baumann RN  Outcome: Ongoing  7/3/2019 0523 by Isabella Arnold RN  Outcome: Ongoing  Goal: Urinary elimination within specified parameters  Description  Urinary elimination within specified parameters  7/3/2019 0830 by Campbell Baumann RN  Outcome: Ongoing  7/3/2019 0523 by Isabella Arnold RN  Outcome: Ongoing     Problem: Pain:  Goal: Pain level will decrease  Description  Pain level will decrease  7/3/2019 0830 by Campbell Baumann RN  Outcome: Ongoing  7/3/2019 0523 by Isabella Arnold RN  Outcome: Ongoing  Goal: Control of acute pain  Description  Control of acute pain  Outcome: Ongoing  Goal: Control of chronic pain  Description  Control of chronic pain  Outcome: Ongoing

## 2019-07-04 PROBLEM — O47.00 PRETERM CONTRACTIONS: Status: ACTIVE | Noted: 2019-07-04

## 2019-07-04 PROCEDURE — 7200000001 HC VAGINAL DELIVERY

## 2019-07-04 PROCEDURE — 6370000000 HC RX 637 (ALT 250 FOR IP): Performed by: ADVANCED PRACTICE MIDWIFE

## 2019-07-04 PROCEDURE — 99024 POSTOP FOLLOW-UP VISIT: CPT | Performed by: OBSTETRICS & GYNECOLOGY

## 2019-07-04 PROCEDURE — 1220000000 HC SEMI PRIVATE OB R&B

## 2019-07-04 RX ADMIN — Medication: at 20:59

## 2019-07-04 RX ADMIN — ACETAMINOPHEN 650 MG: 325 TABLET, FILM COATED ORAL at 03:38

## 2019-07-04 RX ADMIN — ACETAMINOPHEN 650 MG: 325 TABLET, FILM COATED ORAL at 19:32

## 2019-07-04 RX ADMIN — IBUPROFEN 800 MG: 800 TABLET, FILM COATED ORAL at 08:18

## 2019-07-04 RX ADMIN — ACETAMINOPHEN 650 MG: 325 TABLET, FILM COATED ORAL at 13:00

## 2019-07-04 RX ADMIN — DOCUSATE SODIUM 100 MG: 100 CAPSULE, LIQUID FILLED ORAL at 20:59

## 2019-07-04 RX ADMIN — IBUPROFEN 800 MG: 800 TABLET, FILM COATED ORAL at 16:58

## 2019-07-04 ASSESSMENT — PAIN DESCRIPTION - DESCRIPTORS
DESCRIPTORS: ACHING
DESCRIPTORS: ACHING;CRAMPING
DESCRIPTORS: THROBBING
DESCRIPTORS: ACHING;DISCOMFORT

## 2019-07-04 ASSESSMENT — PAIN SCALES - GENERAL
PAINLEVEL_OUTOF10: 4
PAINLEVEL_OUTOF10: 5
PAINLEVEL_OUTOF10: 4

## 2019-07-05 VITALS
SYSTOLIC BLOOD PRESSURE: 120 MMHG | BODY MASS INDEX: 23.64 KG/M2 | TEMPERATURE: 97.6 F | HEIGHT: 68 IN | WEIGHT: 156 LBS | RESPIRATION RATE: 16 BRPM | HEART RATE: 77 BPM | DIASTOLIC BLOOD PRESSURE: 68 MMHG | OXYGEN SATURATION: 100 %

## 2019-07-05 PROBLEM — R10.9 ABDOMINAL PAIN: Status: RESOLVED | Noted: 2019-04-28 | Resolved: 2019-07-05

## 2019-07-05 PROBLEM — O47.00 PRETERM CONTRACTIONS: Status: RESOLVED | Noted: 2019-07-04 | Resolved: 2019-07-05

## 2019-07-05 LAB
CULTURE: NORMAL
Lab: NORMAL
SPECIMEN DESCRIPTION: NORMAL

## 2019-07-05 PROCEDURE — 6370000000 HC RX 637 (ALT 250 FOR IP): Performed by: ADVANCED PRACTICE MIDWIFE

## 2019-07-05 PROCEDURE — 99024 POSTOP FOLLOW-UP VISIT: CPT | Performed by: OBSTETRICS & GYNECOLOGY

## 2019-07-05 RX ADMIN — IBUPROFEN 800 MG: 800 TABLET, FILM COATED ORAL at 01:23

## 2019-07-05 RX ADMIN — ACETAMINOPHEN 650 MG: 325 TABLET, FILM COATED ORAL at 07:25

## 2019-07-05 RX ADMIN — IBUPROFEN 800 MG: 800 TABLET, FILM COATED ORAL at 09:17

## 2019-07-05 ASSESSMENT — PAIN SCALES - GENERAL
PAINLEVEL_OUTOF10: 5
PAINLEVEL_OUTOF10: 4
PAINLEVEL_OUTOF10: 4

## 2019-07-05 NOTE — PLAN OF CARE
Problem: Fluid Volume - Imbalance:  Goal: Absence of postpartum hemorrhage signs and symptoms  Description  Absence of postpartum hemorrhage signs and symptoms  7/5/2019 1138 by Jalen Pineda RN  Outcome: Completed  7/5/2019 1034 by Jalen Pineda RN  Outcome: Ongoing     Problem: Discharge Planning:  Goal: Discharged to appropriate level of care  Description  Discharged to appropriate level of care  7/5/2019 1138 by Jalen Pineda RN  Outcome: Completed  7/5/2019 1034 by Jalen Pineda RN  Outcome: Ongoing     Problem: Constipation:  Goal: Bowel elimination is within specified parameters  Description  Bowel elimination is within specified parameters  7/5/2019 1138 by Jalen Pineda RN  Outcome: Completed  7/5/2019 1034 by Jalen Pineda RN  Outcome: Ongoing     Problem: Mood - Altered:  Goal: Mood stable  Description  Mood stable  7/5/2019 1138 by Jalen Pineda RN  Outcome: Completed  7/5/2019 1034 by Jalen Pineda RN  Outcome: Ongoing

## 2019-07-08 ENCOUNTER — OFFICE VISIT (OUTPATIENT)
Dept: OBGYN | Age: 19
End: 2019-07-08
Payer: OTHER GOVERNMENT

## 2019-07-08 VITALS
BODY MASS INDEX: 20.92 KG/M2 | WEIGHT: 138 LBS | HEIGHT: 68 IN | DIASTOLIC BLOOD PRESSURE: 64 MMHG | SYSTOLIC BLOOD PRESSURE: 116 MMHG

## 2019-07-08 DIAGNOSIS — N61.0 MASTITIS: Primary | ICD-10-CM

## 2019-07-08 LAB — SURGICAL PATHOLOGY REPORT: NORMAL

## 2019-07-08 PROCEDURE — 99202 OFFICE O/P NEW SF 15 MIN: CPT | Performed by: OBSTETRICS & GYNECOLOGY

## 2019-07-08 RX ORDER — DICLOXACILLIN SODIUM 250 MG/1
250 CAPSULE ORAL 4 TIMES DAILY
Qty: 28 CAPSULE | Refills: 0 | Status: SHIPPED | OUTPATIENT
Start: 2019-07-08 | End: 2019-07-15

## 2019-07-08 NOTE — PROGRESS NOTES
Assessment and Plan: The patient is very dissatisfied with breast-feeding and she plans on stopping. She has been pumping a small amount to relieve the pressure. In addition to the antibiotics, I did recommend ice and cabbage leaves to help her during this transition. She may continue to pump somewhat to relieve the pressure          Diagnosis Orders   1. Mastitis  dicloxacillin (DYNAPEN) 250 MG capsule             No follow-ups on file. FF: 15 minutes    There are no Patient Instructions on file for this visit. Over 75%of time spent on counseling and care coordination on: see assessment and plan,  She was also counseled on her preventative health maintenance recommendations and follow-up.       Yuliana Simons,7/8/2019 2:17 PM

## 2019-07-25 ENCOUNTER — TELEPHONE (OUTPATIENT)
Dept: OBGYN | Age: 19
End: 2019-07-25

## 2019-08-05 ENCOUNTER — POSTPARTUM VISIT (OUTPATIENT)
Dept: OBGYN | Age: 19
End: 2019-08-05

## 2019-08-05 VITALS
WEIGHT: 142 LBS | DIASTOLIC BLOOD PRESSURE: 78 MMHG | HEIGHT: 68 IN | SYSTOLIC BLOOD PRESSURE: 122 MMHG | BODY MASS INDEX: 21.52 KG/M2

## 2019-08-05 DIAGNOSIS — O24.419 GESTATIONAL DIABETES MELLITUS (GDM), ANTEPARTUM, GESTATIONAL DIABETES METHOD OF CONTROL UNSPECIFIED: ICD-10-CM

## 2019-08-05 LAB — HGB, POC: 11.5

## 2019-08-05 PROCEDURE — 0503F POSTPARTUM CARE VISIT: CPT | Performed by: ADVANCED PRACTICE MIDWIFE

## 2019-08-05 RX ORDER — OLANZAPINE 5 MG/1
2.5 TABLET ORAL NIGHTLY
COMMUNITY
End: 2020-12-09

## 2019-08-05 RX ORDER — NORGESTIMATE AND ETHINYL ESTRADIOL 0.25-0.035
1 KIT ORAL DAILY
Qty: 1 PACKET | Refills: 12 | Status: SHIPPED | OUTPATIENT
Start: 2019-08-05 | End: 2019-08-05 | Stop reason: SDUPTHER

## 2019-08-05 NOTE — PROGRESS NOTES
yes    EPPDS:3    No results found for this visit on 08/05/19. Nurse: Kimberly DUVALL    HPI:  PT presents for Post partum exam Follow up 6 weeks after delivery. Seeing psychiatrist for meds and counslor, treated with zoloft and olanzipine    Objective   No acute distress  Excellent communications  Well-nourished   Pelvic Exam: GENITAL/URINARY:  External Genitalia:  General appearance; normal, Hair distribution; normal, Lesions absent, well healed                                     Assessment and Plan          Diagnosis Orders   1. Postpartum care and examination  POCT hemoglobin   2. Gestational diabetes mellitus (GDM), antepartum, gestational diabetes method of control unspecified  Glucose Tolerance, 2 Hrs     Continue care with psychiatrist and counselor        I am having Fadumo Vieira maintain her Prenatal Vit-Fe Fumarate-FA (PRENATAL VITAMIN PO), sertraline, and OLANZapine. Return in about 6 months (around 2/5/2020) for yearly. There are no Patient Instructions on file for this visit. Over 50% of time spent on counseling and care coordination on: see assessment and plan,  She was also counseled on her preventative health maintenance recommendations and follow-up.         FF time: 15 min      Cody Johnson,8/5/2019 9:17 AM

## 2019-08-28 ENCOUNTER — HOSPITAL ENCOUNTER (OUTPATIENT)
Dept: NON INVASIVE DIAGNOSTICS | Age: 19
Discharge: HOME | End: 2019-08-28
Payer: OTHER GOVERNMENT

## 2019-08-28 ENCOUNTER — OFFICE VISIT (OUTPATIENT)
Dept: CARDIOLOGY | Age: 19
End: 2019-08-28
Payer: OTHER GOVERNMENT

## 2019-08-28 VITALS
OXYGEN SATURATION: 99 % | WEIGHT: 150 LBS | SYSTOLIC BLOOD PRESSURE: 115 MMHG | HEIGHT: 67 IN | HEART RATE: 69 BPM | RESPIRATION RATE: 18 BRPM | DIASTOLIC BLOOD PRESSURE: 64 MMHG | BODY MASS INDEX: 23.54 KG/M2

## 2019-08-28 DIAGNOSIS — R55 SYNCOPE, UNSPECIFIED SYNCOPE TYPE: ICD-10-CM

## 2019-08-28 DIAGNOSIS — R42 LIGHTHEADED: ICD-10-CM

## 2019-08-28 DIAGNOSIS — Z87.898 HISTORY OF SYNCOPE: ICD-10-CM

## 2019-08-28 DIAGNOSIS — R42 LIGHTHEADED: Primary | ICD-10-CM

## 2019-08-28 DIAGNOSIS — Z92.89 HISTORY OF ABNORMAL HOLTER EXAM: ICD-10-CM

## 2019-08-28 DIAGNOSIS — R00.2 HEART PALPITATIONS: ICD-10-CM

## 2019-08-28 PROCEDURE — 0296T HC EXT ECG RECORDING 2-21 DAY HOOKUP: CPT

## 2019-08-28 PROCEDURE — 99213 OFFICE O/P EST LOW 20 MIN: CPT | Performed by: INTERNAL MEDICINE

## 2019-08-28 NOTE — PROGRESS NOTES
Holter showing frequent, isolated PVCs but no sustained ventricular arrhythmia. Nothing to explain her syncopal episodes. · No further episodes of syncope since her delivery. · Postpartum depression currently treated with Zoloft and Zyprexa. · Continues to complain of lightheadedness and intermittent palpitations. · Echo showed structurally normal heart. · Currently denies any chest pain or shortness of breath. · Fairly active with no significant exertional symptoms. · Nonpharmacologic counseling: Because of her condition, I reminded her to try and keep herself well-hydrated and to take extra time when moving from laying to sitting, sitting to standing and standing to walking. I also explained to her to help improve her symptoms she should include 3 g sodium diet, 1 or 2 L of sports drinks daily, knee-high compressions stockings. · Additional Testing List: Zio Patch to reassess her frequent PVCs. · If no significant arrhythmia recorded with a ZIO Patch then no further cardiac testing or intervention will be needed. Finally, I recommended that she continue her other medications and follow up with you as previously scheduled. FOLLOW UP:   I told Ms. Vieira to call my office if she had any problems, but otherwise I asked her to Return if symptoms worsen or fail to improve. However, I would be happy to see her sooner should the need arise. Sincerely,  Concha Shoemaker MD, F.A.C.C. [facility] Cardiology Specialist    [address]  [address] [facility]  Phone: [phone], Fax: [phone]     I believe that the risk of significant morbidity and mortality related to the patient's current medical conditions are: low-intermediate. 15  minutes were spent with the patient and all of their questions were answered. The documentation recorded by the scribe, accurately and completely reflects the services I personally performed and the decisions made by me. Concha Shoemaker MD, F.A.C.C.  August 28, 2019

## 2019-10-07 NOTE — PROCEDURES
[address]                [address] [place], [address] [facility]                                 EVENT MONITOR    PATIENT NAME: Joann Amaral                       :        2000  MED REC NO:   [id number]                              ROOM:  ACCOUNT NO:   [de-identified]                           ADMIT DATE: 2019  PROVIDER:     Radha Wang    CARDIOVASCULAR DIAGNOSTIC DEPARTMENT    DATE OF STUDY:  2019    ORDERING PROVIDER:  Radha Wang MD    PRIMARY CARE PROVIDER:  None    INTERPRETING PHYSICIAN: Radha Guerrero. Amie Wang MD    DIAGNOSIS:  History of syncope    PHYSICIAN INTERPRETATION:  Only one day and 10 hours of recording. 1. Sinus rhythm with average heart rate of 76 bpm. (Ranges between 50  and 139 bpm). 2. Rare PACs and one 4-beat run of ectopic atrial tachycardia. 3. Occasional isolated PVCs. ANAND CORDOBA    D: 10/07/2019 11:44:52       T: 10/07/2019 11:46:04     ADOLFO/[email or web address]_[email or web address]  Job#: [id number]     Doc#: Unknown

## 2020-02-12 ENCOUNTER — OFFICE VISIT (OUTPATIENT)
Dept: OBGYN | Age: 20
End: 2020-02-12
Payer: OTHER GOVERNMENT

## 2020-02-12 ENCOUNTER — HOSPITAL ENCOUNTER (OUTPATIENT)
Age: 20
Setting detail: SPECIMEN
Discharge: HOME | End: 2020-02-12
Payer: OTHER GOVERNMENT

## 2020-02-12 VITALS
SYSTOLIC BLOOD PRESSURE: 116 MMHG | BODY MASS INDEX: 25.31 KG/M2 | DIASTOLIC BLOOD PRESSURE: 64 MMHG | WEIGHT: 167 LBS | HEIGHT: 68 IN

## 2020-02-12 PROBLEM — F32.A DEPRESSION: Chronic | Status: ACTIVE | Noted: 2020-02-12

## 2020-02-12 PROCEDURE — G0145 SCR C/V CYTO,THINLAYER,RESCR: HCPCS

## 2020-02-12 PROCEDURE — 87624 HPV HI-RISK TYP POOLED RSLT: CPT

## 2020-02-12 PROCEDURE — 99395 PREV VISIT EST AGE 18-39: CPT | Performed by: ADVANCED PRACTICE MIDWIFE

## 2020-02-12 PROCEDURE — G0444 DEPRESSION SCREEN ANNUAL: HCPCS | Performed by: ADVANCED PRACTICE MIDWIFE

## 2020-02-12 RX ORDER — SERTRALINE HYDROCHLORIDE 100 MG/1
200 TABLET, FILM COATED ORAL DAILY
COMMUNITY
End: 2020-12-09

## 2020-02-12 RX ORDER — ARIPIPRAZOLE 15 MG/1
5 TABLET ORAL
COMMUNITY
Start: 2020-01-27 | End: 2020-12-09

## 2020-02-12 RX ORDER — LORAZEPAM 0.5 MG/1
TABLET ORAL
COMMUNITY
Start: 2020-02-11

## 2020-02-12 RX ORDER — TRAZODONE HYDROCHLORIDE 50 MG/1
TABLET ORAL
COMMUNITY
Start: 2020-01-27 | End: 2020-12-09

## 2020-02-12 RX ORDER — VENLAFAXINE 75 MG/1
75 TABLET ORAL DAILY
COMMUNITY

## 2020-02-12 ASSESSMENT — PATIENT HEALTH QUESTIONNAIRE - PHQ9
SUM OF ALL RESPONSES TO PHQ QUESTIONS 1-9: 13
6. FEELING BAD ABOUT YOURSELF - OR THAT YOU ARE A FAILURE OR HAVE LET YOURSELF OR YOUR FAMILY DOWN: 1
10. IF YOU CHECKED OFF ANY PROBLEMS, HOW DIFFICULT HAVE THESE PROBLEMS MADE IT FOR YOU TO DO YOUR WORK, TAKE CARE OF THINGS AT HOME, OR GET ALONG WITH OTHER PEOPLE: 1
SUM OF ALL RESPONSES TO PHQ QUESTIONS 1-9: 13
SUM OF ALL RESPONSES TO PHQ9 QUESTIONS 1 & 2: 4
9. THOUGHTS THAT YOU WOULD BE BETTER OFF DEAD, OR OF HURTING YOURSELF: 0
3. TROUBLE FALLING OR STAYING ASLEEP: 1
8. MOVING OR SPEAKING SO SLOWLY THAT OTHER PEOPLE COULD HAVE NOTICED. OR THE OPPOSITE, BEING SO FIGETY OR RESTLESS THAT YOU HAVE BEEN MOVING AROUND A LOT MORE THAN USUAL: 0
1. LITTLE INTEREST OR PLEASURE IN DOING THINGS: 2
5. POOR APPETITE OR OVEREATING: 3
2. FEELING DOWN, DEPRESSED OR HOPELESS: 2
4. FEELING TIRED OR HAVING LITTLE ENERGY: 3
7. TROUBLE CONCENTRATING ON THINGS, SUCH AS READING THE NEWSPAPER OR WATCHING TELEVISION: 1

## 2020-02-12 NOTE — PROGRESS NOTES
sertraline. No follow-ups on file. She was also counseled on her preventative health maintenance recommendations and follow-up. There are no Patient Instructions on file for this visit.     Selena Johnson,2/16/2020 5:45 PM

## 2020-02-14 ENCOUNTER — TELEPHONE (OUTPATIENT)
Dept: OBGYN | Age: 20
End: 2020-02-14

## 2020-02-14 LAB
HPV SAMPLE: NORMAL
HPV, GENOTYPE 16: NOT DETECTED
HPV, GENOTYPE 18: NOT DETECTED
HPV, HIGH RISK OTHER: NOT DETECTED
HPV, INTERPRETATION: NORMAL
SPECIMEN DESCRIPTION: NORMAL

## 2020-02-14 NOTE — TELEPHONE ENCOUNTER
PA done with Mary Johnson at [facility]. After a $33.00 copay, no other OOP cost will be needed for the Mirena, insertion or removal. Ref # [id number].

## 2020-02-16 ASSESSMENT — ENCOUNTER SYMPTOMS
BACK PAIN: 0
SHORTNESS OF BREATH: 0
ABDOMINAL PAIN: 0

## 2020-02-27 LAB — CYTOLOGY REPORT: NORMAL

## 2020-03-03 ENCOUNTER — HOSPITAL ENCOUNTER (OUTPATIENT)
Age: 20
Discharge: HOME | End: 2020-03-03
Payer: OTHER GOVERNMENT

## 2020-03-03 LAB — HCG QUANTITATIVE: <1 IU/L

## 2020-03-03 PROCEDURE — 84702 CHORIONIC GONADOTROPIN TEST: CPT

## 2020-03-03 PROCEDURE — 36415 COLL VENOUS BLD VENIPUNCTURE: CPT

## 2020-03-04 ENCOUNTER — PROCEDURE VISIT (OUTPATIENT)
Dept: OBGYN | Age: 20
End: 2020-03-04
Payer: OTHER GOVERNMENT

## 2020-03-04 ENCOUNTER — HOSPITAL ENCOUNTER (OUTPATIENT)
Age: 20
Setting detail: SPECIMEN
Discharge: HOME | End: 2020-03-04
Payer: OTHER GOVERNMENT

## 2020-03-04 VITALS
WEIGHT: 169 LBS | SYSTOLIC BLOOD PRESSURE: 116 MMHG | BODY MASS INDEX: 25.61 KG/M2 | DIASTOLIC BLOOD PRESSURE: 82 MMHG | HEIGHT: 68 IN

## 2020-03-04 PROCEDURE — G0145 SCR C/V CYTO,THINLAYER,RESCR: HCPCS

## 2020-03-04 PROCEDURE — 58300 INSERT INTRAUTERINE DEVICE: CPT | Performed by: ADVANCED PRACTICE MIDWIFE

## 2020-03-04 PROCEDURE — 87591 N.GONORRHOEAE DNA AMP PROB: CPT

## 2020-03-04 PROCEDURE — 87491 CHLMYD TRACH DNA AMP PROBE: CPT

## 2020-03-04 NOTE — PROGRESS NOTES
The patient is a 22 y.o. female that presents for IUD insertion for menorrhagia    OB History        1    Para   1    Term   0       1    AB   0    Living   1       SAB   0    TAB   0    Ectopic   0    Molar   0    Multiple   0    Live Births   1                Allergies: Norco [hydrocodone-acetaminophen]    Vitals: Blood pressure 116/82, height 5' 8\" (1.727 m), weight 169 lb (76.7 kg), last menstrual period 2020, not currently breastfeeding. Last coitus: 1  month(s)    Premedicated with Motrin 800 mg: No    Cytotec 200mcg:No    UCG: negative    Consent signed:  Yes    PROCEDURE:    Mirena      Bimanual exam: anteverted    Cervix cleansed with: Betadinex3    Tenaculum applied: Yes     Uterus sounded: 8cm    Mirena inserted without difficulty. IUD strings trimmed to 2 cm. Assessment:   Diagnosis Orders   1. Menorrhagia with regular cycle  PA INSERT INTRAUTERINE DEVICE    levonorgestrel (MIRENA) IUD 52 mg 1 each    C.trachomatis N.gonorrhoeae DNA, Thin Prep   2. Screening for cervical cancer  PAP SMEAR         Plan:  Education on IUD    Motrin 800 mg Q 8 hours PRN  RTO one month for ultrasound for string check. See [facility] for lot # and NDC #    Return in about 1 month (around 2020), or string check.     Selena Johnson,3/4/2020 11:10 AM

## 2020-03-06 LAB
CHLAMYDIA BY THIN PREP: NEGATIVE
N. GONORRHOEAE DNA, THIN PREP: NEGATIVE
SPECIMEN DESCRIPTION: NORMAL

## 2020-03-11 ENCOUNTER — TELEPHONE (OUTPATIENT)
Dept: OBGYN | Age: 20
End: 2020-03-11

## 2020-03-11 NOTE — TELEPHONE ENCOUNTER
Patient called and left message with concerns of abdominal pain and low grade fever. Patient had IUD placed last week. Advice?

## 2020-03-11 NOTE — TELEPHONE ENCOUNTER
Patient notified of plan and voices understanding. Orders entered for CBC to be drawn and scheduled for U/S 03/12/2020.

## 2020-03-12 ENCOUNTER — HOSPITAL ENCOUNTER (OUTPATIENT)
Age: 20
Discharge: HOME | End: 2020-03-12
Payer: OTHER GOVERNMENT

## 2020-03-12 ENCOUNTER — ANCILLARY PROCEDURE (OUTPATIENT)
Dept: OBGYN | Age: 20
End: 2020-03-12
Payer: OTHER GOVERNMENT

## 2020-03-12 LAB
ABSOLUTE EOS #: 0.25 K/UL (ref 0–0.44)
ABSOLUTE IMMATURE GRANULOCYTE: <0.03 K/UL (ref 0–0.3)
ABSOLUTE LYMPH #: 0.96 K/UL (ref 1.1–3.7)
ABSOLUTE MONO #: 0.42 K/UL (ref 0.1–1.4)
BASOPHILS # BLD: 1 % (ref 0–2)
BASOPHILS ABSOLUTE: 0.04 K/UL (ref 0–0.2)
DIFFERENTIAL TYPE: ABNORMAL
EOSINOPHILS RELATIVE PERCENT: 6 % (ref 1–4)
HCT VFR BLD CALC: 36.7 % (ref 36.3–47.1)
HEMOGLOBIN: 11.6 G/DL (ref 11.9–15.1)
IMMATURE GRANULOCYTES: 0 %
LYMPHOCYTES # BLD: 22 % (ref 25–45)
MCH RBC QN AUTO: 28.9 PG (ref 25.2–33.5)
MCHC RBC AUTO-ENTMCNC: 31.6 G/DL (ref 28.4–34.8)
MCV RBC AUTO: 91.3 FL (ref 82.6–102.9)
MONOCYTES # BLD: 10 % (ref 2–8)
NRBC AUTOMATED: 0 PER 100 WBC
PDW BLD-RTO: 13 % (ref 11.8–14.4)
PLATELET # BLD: 241 K/UL (ref 138–453)
PLATELET ESTIMATE: ABNORMAL
PMV BLD AUTO: 9.5 FL (ref 8.1–13.5)
RBC # BLD: 4.02 M/UL (ref 3.95–5.11)
RBC # BLD: ABNORMAL 10*6/UL
SEG NEUTROPHILS: 61 % (ref 34–64)
SEGMENTED NEUTROPHILS ABSOLUTE COUNT: 2.72 K/UL (ref 1.5–8.1)
WBC # BLD: 4.4 K/UL (ref 4.5–13.5)
WBC # BLD: ABNORMAL 10*3/UL

## 2020-03-12 PROCEDURE — 85025 COMPLETE CBC W/AUTO DIFF WBC: CPT

## 2020-03-12 PROCEDURE — 36415 COLL VENOUS BLD VENIPUNCTURE: CPT

## 2020-03-12 PROCEDURE — 76830 TRANSVAGINAL US NON-OB: CPT | Performed by: OBSTETRICS & GYNECOLOGY

## 2020-03-13 LAB — CYTOLOGY REPORT: NORMAL

## 2020-03-16 RX ORDER — TERCONAZOLE 80 MG/1
80 SUPPOSITORY VAGINAL NIGHTLY
Qty: 3 SUPPOSITORY | Refills: 0 | Status: SHIPPED | OUTPATIENT
Start: 2020-03-16 | End: 2020-03-19

## 2020-05-21 ENCOUNTER — HOSPITAL ENCOUNTER (OUTPATIENT)
Age: 20
Setting detail: SPECIMEN
Discharge: HOME | End: 2020-05-21
Payer: OTHER GOVERNMENT

## 2020-05-21 ENCOUNTER — OFFICE VISIT (OUTPATIENT)
Dept: OBGYN | Age: 20
End: 2020-05-21
Payer: OTHER GOVERNMENT

## 2020-05-21 VITALS
WEIGHT: 167 LBS | HEIGHT: 68 IN | DIASTOLIC BLOOD PRESSURE: 74 MMHG | SYSTOLIC BLOOD PRESSURE: 108 MMHG | BODY MASS INDEX: 25.31 KG/M2

## 2020-05-21 LAB
CONTROL: NORMAL
PREGNANCY TEST URINE, POC: NEGATIVE

## 2020-05-21 PROCEDURE — 87070 CULTURE OTHR SPECIMN AEROBIC: CPT

## 2020-05-21 PROCEDURE — 99213 OFFICE O/P EST LOW 20 MIN: CPT | Performed by: ADVANCED PRACTICE MIDWIFE

## 2020-05-21 PROCEDURE — 81025 URINE PREGNANCY TEST: CPT | Performed by: ADVANCED PRACTICE MIDWIFE

## 2020-05-21 NOTE — PROGRESS NOTES
Objective   Cor: regular rate and rhythm, no murmurs              Pul:clear to auscultation bilaterally- no wheezes, rales or rhonchi, normal air movement, no respiratory distress      Pelvic Exam: GENITAL/URINARY:  External Genitalia:  General appearance; normal, Hair distribution; normal, Lesions absent  Vagina:  General appearance normal, Estrogen effect normal, Discharge absent, Lesions absent, Pelvic support normal  Cervix:  General appearance normal, Lesions absent, Discharge absent, Tenderness absent, Enlargement absent, Nodularity absent, IUD string visualized. Vaginal discharge: no vaginal discharge, culture taken                        Results reviewed today:    Results for orders placed or performed in visit on 05/21/20   POCT urine pregnancy   Result Value Ref Range    Preg Test, Ur negative     Control         Assessment and Plan          Diagnosis Orders   1. Irregular bleeding  POCT urine pregnancy   2. Checking of intrauterine device     3. Vaginal odor  Culture, Genital             I am having Joann Vieira maintain her OLANZapine, ARIPiprazole, LORazepam, traZODone, venlafaxine, and sertraline. We will continue to administer levonorgestrel. Return if symptoms worsen or fail to improve. There are no Patient Instructions on file for this visit. Over 50% of time spent on counseling and care coordination on: see assessment and plan,  She was also counseled on her preventative health maintenance recommendations and follow-up.         FF time: 15 min      Pauline Johnson,5/21/2020 11:38 AM

## 2020-05-24 LAB
CULTURE: NORMAL
Lab: NORMAL
SPECIMEN DESCRIPTION: NORMAL

## 2020-06-18 ENCOUNTER — TELEPHONE (OUTPATIENT)
Dept: OBGYN | Age: 20
End: 2020-06-18

## 2020-06-18 NOTE — TELEPHONE ENCOUNTER
Patient calls with c/o sharp LLQ pain that started last night, mild spotting , generalized body aches and headache. Slight diarrhea and nausea, no fever. No urinary symptoms. Patient has Mirena that was placed in the spring. Wondering if symptoms are related to that or something else. Advice?
Patient was seen by PCP and will possibly want IUD removed but will call to schedule if she desires removal.
50 feet

## 2020-10-15 ENCOUNTER — HOSPITAL ENCOUNTER (EMERGENCY)
Age: 20
Discharge: HOME | End: 2020-10-15
Payer: OTHER GOVERNMENT

## 2020-10-15 ENCOUNTER — TELEPHONE (OUTPATIENT)
Dept: OBGYN | Age: 20
End: 2020-10-15

## 2020-10-15 VITALS
OXYGEN SATURATION: 100 % | SYSTOLIC BLOOD PRESSURE: 118 MMHG | WEIGHT: 160 LBS | DIASTOLIC BLOOD PRESSURE: 64 MMHG | BODY MASS INDEX: 24.25 KG/M2 | HEIGHT: 68 IN | RESPIRATION RATE: 16 BRPM | HEART RATE: 70 BPM | TEMPERATURE: 97.4 F

## 2020-10-15 LAB
-: NORMAL
ABSOLUTE EOS #: 0.16 K/UL (ref 0–0.44)
ABSOLUTE IMMATURE GRANULOCYTE: <0.03 K/UL (ref 0–0.3)
ABSOLUTE LYMPH #: 1.6 K/UL (ref 1.1–3.7)
ABSOLUTE MONO #: 0.35 K/UL (ref 0.1–1.2)
ALBUMIN SERPL-MCNC: 4.5 G/DL (ref 3.5–5.2)
ALBUMIN/GLOBULIN RATIO: 1.6 (ref 1–2.5)
ALP BLD-CCNC: 95 U/L (ref 35–104)
ALT SERPL-CCNC: 9 U/L (ref 5–33)
AMORPHOUS: NORMAL
ANION GAP SERPL CALCULATED.3IONS-SCNC: 12 MMOL/L (ref 9–17)
AST SERPL-CCNC: 16 U/L
BACTERIA: NORMAL
BASOPHILS # BLD: 1 % (ref 0–2)
BASOPHILS ABSOLUTE: 0.08 K/UL (ref 0–0.2)
BILIRUB SERPL-MCNC: 0.39 MG/DL (ref 0.3–1.2)
BILIRUBIN URINE: NEGATIVE
BUN BLDV-MCNC: 8 MG/DL (ref 6–20)
BUN/CREAT BLD: 11 (ref 9–20)
CALCIUM SERPL-MCNC: 9 MG/DL (ref 8.6–10.4)
CASTS UA: NORMAL /LPF
CHLORIDE BLD-SCNC: 101 MMOL/L (ref 98–107)
CO2: 24 MMOL/L (ref 20–31)
COLOR: YELLOW
COMMENT UA: ABNORMAL
CREAT SERPL-MCNC: 0.72 MG/DL (ref 0.5–0.9)
CRYSTALS, UA: NORMAL /HPF
DIFFERENTIAL TYPE: NORMAL
EOSINOPHILS RELATIVE PERCENT: 3 % (ref 1–4)
EPITHELIAL CELLS UA: NORMAL /HPF (ref 0–25)
GFR AFRICAN AMERICAN: >60 ML/MIN
GFR NON-AFRICAN AMERICAN: >60 ML/MIN
GFR SERPL CREATININE-BSD FRML MDRD: NORMAL ML/MIN/{1.73_M2}
GFR SERPL CREATININE-BSD FRML MDRD: NORMAL ML/MIN/{1.73_M2}
GLUCOSE BLD-MCNC: 86 MG/DL (ref 70–99)
GLUCOSE URINE: NEGATIVE
HCG(URINE) PREGNANCY TEST: NEGATIVE
HCT VFR BLD CALC: 36.3 % (ref 36.3–47.1)
HEMOGLOBIN: 12 G/DL (ref 11.9–15.1)
IMMATURE GRANULOCYTES: 0 %
KETONES, URINE: NEGATIVE
LEUKOCYTE ESTERASE, URINE: NEGATIVE
LYMPHOCYTES # BLD: 28 % (ref 24–43)
MCH RBC QN AUTO: 29.6 PG (ref 25.2–33.5)
MCHC RBC AUTO-ENTMCNC: 33.1 G/DL (ref 28.4–34.8)
MCV RBC AUTO: 89.6 FL (ref 82.6–102.9)
MONOCYTES # BLD: 6 % (ref 3–12)
MUCUS: NORMAL
NITRITE, URINE: NEGATIVE
NRBC AUTOMATED: 0 PER 100 WBC
OTHER OBSERVATIONS UA: NORMAL
PDW BLD-RTO: 12 % (ref 11.8–14.4)
PH UA: 6.5 (ref 5–9)
PLATELET # BLD: 291 K/UL (ref 138–453)
PLATELET ESTIMATE: NORMAL
PMV BLD AUTO: 9.2 FL (ref 8.1–13.5)
POTASSIUM SERPL-SCNC: 3.9 MMOL/L (ref 3.7–5.3)
PROTEIN UA: NEGATIVE
RBC # BLD: 4.05 M/UL (ref 3.95–5.11)
RBC # BLD: NORMAL 10*6/UL
RBC UA: NORMAL /HPF (ref 0–2)
RENAL EPITHELIAL, UA: NORMAL /HPF
SEG NEUTROPHILS: 62 % (ref 36–65)
SEGMENTED NEUTROPHILS ABSOLUTE COUNT: 3.57 K/UL (ref 1.5–8.1)
SODIUM BLD-SCNC: 137 MMOL/L (ref 135–144)
SPECIFIC GRAVITY UA: 1.01 (ref 1.01–1.02)
TOTAL PROTEIN: 7.4 G/DL (ref 6.4–8.3)
TRICHOMONAS: NORMAL
TURBIDITY: CLEAR
URINE HGB: ABNORMAL
UROBILINOGEN, URINE: NORMAL
WBC # BLD: 5.8 K/UL (ref 3.5–11.3)
WBC # BLD: NORMAL 10*3/UL
WBC UA: NORMAL /HPF (ref 0–5)
YEAST: NORMAL

## 2020-10-15 PROCEDURE — 85025 COMPLETE CBC W/AUTO DIFF WBC: CPT

## 2020-10-15 PROCEDURE — 36415 COLL VENOUS BLD VENIPUNCTURE: CPT

## 2020-10-15 PROCEDURE — 2580000003 HC RX 258: Performed by: PHYSICIAN ASSISTANT

## 2020-10-15 PROCEDURE — 6360000002 HC RX W HCPCS: Performed by: PHYSICIAN ASSISTANT

## 2020-10-15 PROCEDURE — 81025 URINE PREGNANCY TEST: CPT

## 2020-10-15 PROCEDURE — 96361 HYDRATE IV INFUSION ADD-ON: CPT

## 2020-10-15 PROCEDURE — 96374 THER/PROPH/DIAG INJ IV PUSH: CPT

## 2020-10-15 PROCEDURE — 99283 EMERGENCY DEPT VISIT LOW MDM: CPT

## 2020-10-15 PROCEDURE — 80053 COMPREHEN METABOLIC PANEL: CPT

## 2020-10-15 PROCEDURE — 99282 EMERGENCY DEPT VISIT SF MDM: CPT

## 2020-10-15 PROCEDURE — 81001 URINALYSIS AUTO W/SCOPE: CPT

## 2020-10-15 PROCEDURE — 6370000000 HC RX 637 (ALT 250 FOR IP): Performed by: PHYSICIAN ASSISTANT

## 2020-10-15 RX ORDER — ONDANSETRON 2 MG/ML
4 INJECTION INTRAMUSCULAR; INTRAVENOUS ONCE
Status: COMPLETED | OUTPATIENT
Start: 2020-10-15 | End: 2020-10-15

## 2020-10-15 RX ORDER — DICYCLOMINE HYDROCHLORIDE 10 MG/1
10 CAPSULE ORAL ONCE
Status: COMPLETED | OUTPATIENT
Start: 2020-10-15 | End: 2020-10-15

## 2020-10-15 RX ORDER — ONDANSETRON 4 MG/1
4 TABLET, ORALLY DISINTEGRATING ORAL EVERY 8 HOURS PRN
Qty: 20 TABLET | Refills: 0 | Status: SHIPPED | OUTPATIENT
Start: 2020-10-15 | End: 2020-11-03 | Stop reason: ALTCHOICE

## 2020-10-15 RX ORDER — 0.9 % SODIUM CHLORIDE 0.9 %
1000 INTRAVENOUS SOLUTION INTRAVENOUS ONCE
Status: COMPLETED | OUTPATIENT
Start: 2020-10-15 | End: 2020-10-15

## 2020-10-15 RX ORDER — DICYCLOMINE HYDROCHLORIDE 10 MG/1
10 CAPSULE ORAL 3 TIMES DAILY PRN
Qty: 20 CAPSULE | Refills: 0 | Status: SHIPPED | OUTPATIENT
Start: 2020-10-15 | End: 2020-12-09

## 2020-10-15 RX ADMIN — ONDANSETRON 4 MG: 2 INJECTION INTRAMUSCULAR; INTRAVENOUS at 18:12

## 2020-10-15 RX ADMIN — SODIUM CHLORIDE 1000 ML: 9 INJECTION, SOLUTION INTRAVENOUS at 18:12

## 2020-10-15 RX ADMIN — DICYCLOMINE HYDROCHLORIDE 10 MG: 10 CAPSULE ORAL at 18:12

## 2020-10-15 ASSESSMENT — PAIN DESCRIPTION - PROGRESSION: CLINICAL_PROGRESSION: RAPIDLY IMPROVING

## 2020-10-15 ASSESSMENT — PAIN SCALES - GENERAL: PAINLEVEL_OUTOF10: 2

## 2020-10-15 ASSESSMENT — PAIN - FUNCTIONAL ASSESSMENT: PAIN_FUNCTIONAL_ASSESSMENT: ACTIVITIES ARE NOT PREVENTED

## 2020-10-15 NOTE — ED PROVIDER NOTES
[facility] ED  eMERGENCY dEPARTMENT eNCOUnter      Pt Name: Britni Nunn  MRN: [id number]  [place] 2000  Date of evaluation: 10/15/20      CHIEF COMPLAINT:  Chief Complaint   Patient presents with    Diarrhea     Ongoing, worse x 1 week    Nausea     Onset PTA during a BM, resolved now    Abdominal Pain     Bilateral lower abdomen, cramping       HISTORY OF PRESENT ILLNESS    Britni Nunn is a 23 y.o. female who presents to the ED with mother c/o of nausea vomiting diarrhea that started last week. States her main concern was diarrhea. She has had her gall bladder removed in the past and states she usually has softer stools but has been having worsening diarrhea. States has had a poor appetite due to nausea. Denies any abdominal pain but states at times has abdominal cramping while vomiting or having diarrhea. Denies any blood in the vomit or stool  Denies any fevers or chills chest pain or shortness of breath      REVIEW OF SYSTEMS       Constitutional: Denies recent fever, chills. Eyes: No visual changes. Neck: No neck pain. Respiratory: Denies recent shortness of breath. Cardiac:  Denies recent chest pain. GI:  Nausea vomiting diarrhea    Denies Blood in the stool or black tarry stools. : denies dysuria. Musculoskeletal: Denies focal weakness. Neurologic: denies headache or focal weakness. Skin:  Denies any rash. Negative in 10 essential Systems except as mentioned above and in the HPI. PAST MEDICAL HISTORY   PMH:  has a past medical history of Depression, Depression, Diabetes mellitus ([address] History of echocardiogram, History of Holter monitoring, and Hypoglycemia. Surgical History:  has a past surgical history that includes Knee arthroscopy (Right); Knee arthroscopy (Right, 11/04/2016); knee surgery ([id number]); and Gallbladder surgery. Social History:  reports that she has never smoked.  She has never used smokeless tobacco. She reports that she does not drink alcohol or use drugs. Family History: Noncontributory at this time  Psychiatric History: Noncontributory at this time    Allergies:is allergic to norco [hydrocodone-acetaminophen]. PHYSICAL EXAM     INITIAL VITALS: /64   Pulse 70   Temp 97.4 °F (36.3 °C)   Resp 16   Ht 5' 8\" (1.727 m)   Wt 160 lb (72.6 kg)   SpO2 100%   BMI 24.33 kg/m²   Constitutional:  Well developed   Eyes:  Pupils equal and readily reactive to light  HENT:  Atraumatic, external ears normal, nose normal, oropharynx moist. Neck- supple   Respiratory:  Clear to auscultation bilaterally with good air exchange  Cardiovascular:  RRR with normal S1 and S2  Gastrointestinal/Abdomen:  Soft, mild tenderness at the epigastrium area , ND, negative Bland sign, no tenderness at McBurney's point  No pulsatile masses palpated. Musculoskeletal:  No edema, no tenderness, no deformities. Back:  No CVA tenderness. Normal to inspection. Integument:  No rash. Neurologic:  Alert & oriented x 3, no focal deficits noted       DIAGNOSTIC RESULTS     EKG: All EKG's are interpreted by the Emergency Department Physician who either signs or Co-signs this chart in the absence of a cardiologist.      RADIOLOGY:   All plain film, CT, MRI, and formal ultrasound images (except ED bedside ultrasound) are read by the radiologist  No orders to display         LABS:  Labs Reviewed   URINE RT REFLEX TO CULTURE - Abnormal; Notable for the following components:       Result Value    Urine Hgb 3+ (*)     All other components within normal limits   CBC WITH AUTO DIFFERENTIAL   COMPREHENSIVE METABOLIC PANEL   PREGNANCY, URINE   MICROSCOPIC URINALYSIS         EMERGENCY DEPARTMENT COURSE:   -------------------------  Pt is feeling much better    Brat diet, advance as tolerated. The patient presents with abdominal pain without signs of peritonitis or other life-threatening or serious etiology. Re-evaluation of the abdomen is benign.   No guarding, peritoneal signs or significant tenderness noted. The patient appears stable for discharge and has been instructed to follow up with their primary care physician as soon as possible or to return immediately if the symptoms worsen in any way  The patient verbalized understanding. Orders Placed This Encounter   Medications    0.9 % sodium chloride bolus    dicyclomine (BENTYL) capsule 10 mg    ondansetron (ZOFRAN) injection 4 mg    dicyclomine (BENTYL) 10 MG capsule     Sig: Take 1 capsule by mouth 3 times daily as needed (cramps)     Dispense:  20 capsule     Refill:  0    ondansetron (ZOFRAN ODT) 4 MG disintegrating tablet     Sig: Take 1 tablet by mouth every 8 hours as needed for Nausea     Dispense:  20 tablet     Refill:  0       CONSULTS:  None      FINAL IMPRESSION      1. Nausea, vomiting and diarrhea          DISPOSITION/PLAN:  DISPOSITION Decision To Discharge      PATIENT REFERRED TO:  Eva Dorantes  [phone]    Schedule an appointment as soon as possible for a visit       [facility]  [address] [place] [phone]    For worsening symptoms, or any other concern      DISCHARGE MEDICATIONS:  Discharge Medication List as of 10/15/2020  7:31 PM      START taking these medications    Details   dicyclomine (BENTYL) 10 MG capsule Take 1 capsule by mouth 3 times daily as needed (cramps), Disp-20 capsule,R-0Print      ondansetron (ZOFRAN ODT) 4 MG disintegrating tablet Take 1 tablet by mouth every 8 hours as needed for Nausea, Disp-20 tablet,R-0Print             (Please note that portions of this note were completed with a voice recognition program.  Efforts were made to edit the dictations but occasionally words are mis-transcribed.)    KRISTA Mc PA  10/16/20 1528

## 2020-10-15 NOTE — TELEPHONE ENCOUNTER
Patient called with concerns of bleeding cramps feeling dizzy and drooling. Patient concerned it is related to IUD, patient left message that she was going to ED for further evaluation. I did speak to patient and she advised me she was at the ED currently.

## 2020-11-03 ENCOUNTER — HOSPITAL ENCOUNTER (OUTPATIENT)
Age: 20
Setting detail: SPECIMEN
Discharge: HOME | End: 2020-11-03
Payer: OTHER GOVERNMENT

## 2020-11-03 ENCOUNTER — OFFICE VISIT (OUTPATIENT)
Dept: OBGYN | Age: 20
End: 2020-11-03
Payer: OTHER GOVERNMENT

## 2020-11-03 VITALS
SYSTOLIC BLOOD PRESSURE: 120 MMHG | DIASTOLIC BLOOD PRESSURE: 78 MMHG | HEIGHT: 68 IN | WEIGHT: 177 LBS | BODY MASS INDEX: 26.83 KG/M2

## 2020-11-03 LAB
DIRECT EXAM: NORMAL
Lab: NORMAL
SPECIMEN DESCRIPTION: NORMAL

## 2020-11-03 PROCEDURE — 87480 CANDIDA DNA DIR PROBE: CPT

## 2020-11-03 PROCEDURE — 99213 OFFICE O/P EST LOW 20 MIN: CPT | Performed by: ADVANCED PRACTICE MIDWIFE

## 2020-11-03 PROCEDURE — 87591 N.GONORRHOEAE DNA AMP PROB: CPT

## 2020-11-03 PROCEDURE — 86403 PARTICLE AGGLUT ANTBDY SCRN: CPT

## 2020-11-03 PROCEDURE — 87510 GARDNER VAG DNA DIR PROBE: CPT

## 2020-11-03 PROCEDURE — 87491 CHLMYD TRACH DNA AMP PROBE: CPT

## 2020-11-03 PROCEDURE — 99211 OFF/OP EST MAY X REQ PHY/QHP: CPT | Performed by: ADVANCED PRACTICE MIDWIFE

## 2020-11-03 PROCEDURE — 87070 CULTURE OTHR SPECIMN AEROBIC: CPT

## 2020-11-03 PROCEDURE — 87205 SMEAR GRAM STAIN: CPT

## 2020-11-03 PROCEDURE — 87660 TRICHOMONAS VAGIN DIR PROBE: CPT

## 2020-11-03 RX ORDER — SULFAMETHOXAZOLE AND TRIMETHOPRIM 800; 160 MG/1; MG/1
1 TABLET ORAL 2 TIMES DAILY
Qty: 14 TABLET | Refills: 0 | Status: SHIPPED | OUTPATIENT
Start: 2020-11-03 | End: 2020-11-10

## 2020-11-03 NOTE — PROGRESS NOTES
PROBLEM VISIT     Date of service: 11/3/2020    Andrew Cano  Is a 23 y.o.  female    PT's PCP is: Breana Alejandro     : 2000                                             Subjective:       Patient's last menstrual period was 10/15/2020 (approximate). OB History    Para Term  AB Living   1 1 0 1 0 1   SAB TAB Ectopic Molar Multiple Live Births   0 0 0 0 0 1      # Outcome Date GA Lbr Messi/2nd Weight Sex Delivery Anes PTL Lv   1  19 35w5d 09:37 / 00:17 6 lb 2.5 oz (2.792 kg) M Vag-Spont EPI Y MINESH        Social History     Tobacco Use   Smoking Status Never Smoker   Smokeless Tobacco Never Used        Social History     Substance and Sexual Activity   Alcohol Use No       Allergies: Norco [hydrocodone-acetaminophen]      Current Outpatient Medications:     LORazepam (ATIVAN) 0.5 MG tablet, , Disp: , Rfl:     venlafaxine (EFFEXOR) 75 MG tablet, Take 75 mg by mouth daily, Disp: , Rfl:     dicyclomine (BENTYL) 10 MG capsule, Take 1 capsule by mouth 3 times daily as needed (cramps), Disp: 20 capsule, Rfl: 0    ondansetron (ZOFRAN ODT) 4 MG disintegrating tablet, Take 1 tablet by mouth every 8 hours as needed for Nausea (Patient not taking: Reported on 11/3/2020), Disp: 20 tablet, Rfl: 0    ARIPiprazole (ABILIFY) 15 MG tablet, 5 mg , Disp: , Rfl:     traZODone (DESYREL) 50 MG tablet, , Disp: , Rfl:     sertraline (ZOLOFT) 100 MG tablet, Take 200 mg by mouth daily, Disp: , Rfl:     OLANZapine (ZYPREXA) 5 MG tablet, Take 2.5 mg by mouth nightly , Disp: , Rfl:     Social History     Substance and Sexual Activity   Sexual Activity Yes    Partners: Male    Birth control/protection: I.U.D. Last Yearly:  2020    Last pap: 2020    Last HPV: na    Chief Complaint   Patient presents with    Vaginal Discharge     C/O vaginal odor and white vaginal discharge for several months. Patient has Mirena IUD that was placed 2020.     Mass     C/O vulva bump for approx. 1 week , painful, white discharge. PE:  Vital Signs  Blood pressure 120/78, height 5' 8\" (1.727 m), weight 177 lb (80.3 kg), last menstrual period 10/15/2020, not currently breastfeeding. Estimated body mass index is 26.91 kg/m² as calculated from the following:    Height as of this encounter: 5' 8\" (1.727 m). Weight as of this encounter: 177 lb (80.3 kg). NURSE: Kimberly DUVALL    HPI: here for c/o vaginal discharge and irritation, has mirena and \"not happy with it\" her  is getting home tomorrow from being in service and \"it hurts him\"; also has vulvar \"bump\" that she squeezed some white fluid from     PT denies fever, chills, nausea and vomiting       Objective   No acute distress  Excellent communications  Well-nourished      GI: Abdomen soft, non-tender. BS normal. No masses,  No organomegaly           Extremities: normal strength, tone, and muscle mass     Pelvic Exam: GENITAL/URINARY:  External Genitalia:  General appearance; normal, Hair distribution; normal, Lesions absent, 1 oclock l. Majora 7 mm red bump able to be expressed but tender and  Culture taken  Urethral Meatus:  Size normal, Location normal, Lesions absent, Prolapse absent  Urethra: Fullness absent, Masses absent  Bladder:  Fullness absent, Masses absent, Tenderness absent, Cystocele absent  Vagina:  General appearance normal, Estrogen effect normal, Discharge thin white, no odor, Lesions absent, Pelvic support normal  Cervix:  General appearance normal, Lesions absent, Discharge absent, Tenderness absent, Enlargement absent, Nodularity absent, strings trimmed flush with os  Uterus:  Size normal, Tenderness absent  Adenex[place]: Masses absent, Tenderness absent  Anus/Perineum:  Lesions absent and Masses absent                                                        Results reviewed today:    No results found for this visit on 11/03/20. Assessment and Plan          Diagnosis Orders   1.  Boil of vulva  Culture, Wound    sulfamethoxazole-trimethoprim (BACTRIM DS;SEPTRA DS) 800-160 MG per tablet   2. Vaginal discharge  VAGINITIS DNA PROBE    C.trachomatis N.gonorrhoeae DNA             I am having Joann Vieira start on sulfamethoxazole-trimethoprim. I am also having her maintain her OLANZapine, ARIPiprazole, LORazepam, traZODone, venlafaxine, sertraline, dicyclomine, and ondansetron. We will continue to administer levonorgestrel. No follow-ups on file. There are no Patient Instructions on file for this visit. Over 50% of time spent on counseling and care coordination on: see assessment and plan,  She was also counseled on her preventative health maintenance recommendations and follow-up.         FF time: 15 min      Tianna Johnson,11/3/2020 2:07 PM

## 2020-11-05 LAB
C TRACH DNA GENITAL QL NAA+PROBE: NEGATIVE
CULTURE: ABNORMAL
CULTURE: ABNORMAL
DIRECT EXAM: ABNORMAL
Lab: ABNORMAL
N. GONORRHOEAE DNA: NEGATIVE
SPECIMEN DESCRIPTION: ABNORMAL
SPECIMEN DESCRIPTION: NORMAL

## 2020-12-09 ENCOUNTER — PROCEDURE VISIT (OUTPATIENT)
Dept: OBGYN | Age: 20
End: 2020-12-09
Payer: OTHER GOVERNMENT

## 2020-12-09 VITALS
HEIGHT: 68 IN | DIASTOLIC BLOOD PRESSURE: 72 MMHG | BODY MASS INDEX: 27.01 KG/M2 | WEIGHT: 178.2 LBS | SYSTOLIC BLOOD PRESSURE: 108 MMHG

## 2020-12-09 PROCEDURE — 99211 OFF/OP EST MAY X REQ PHY/QHP: CPT | Performed by: ADVANCED PRACTICE MIDWIFE

## 2020-12-09 PROCEDURE — 58301 REMOVE INTRAUTERINE DEVICE: CPT | Performed by: ADVANCED PRACTICE MIDWIFE

## 2020-12-09 RX ORDER — METFORMIN HYDROCHLORIDE 750 MG/1
750 TABLET, EXTENDED RELEASE ORAL
COMMUNITY
Start: 2020-12-08

## 2020-12-09 RX ORDER — DROSPIRENONE AND ETHINYL ESTRADIOL 0.03MG-3MG
1 KIT ORAL DAILY
Qty: 1 PACKET | Refills: 3 | Status: SHIPPED | OUTPATIENT
Start: 2020-12-09

## 2020-12-09 NOTE — PROGRESS NOTES
PROBLEM VISIT     Date of service: 2020    Clearance Isha  Is a 23 y.o.  female    PT's PCP is: Terry Blandon     : 2000                                             Subjective:       No LMP recorded (lmp unknown). (Menstrual status: Irregular periods). OB History    Para Term  AB Living   1 1 0 1 0 1   SAB TAB Ectopic Molar Multiple Live Births   0 0 0 0 0 1      # Outcome Date GA Lbr Messi/2nd Weight Sex Delivery Anes PTL Lv   1  19 35w5d 09:37 / 00:17 6 lb 2.5 oz (2.792 kg) M Vag-Spont EPI Y MINESH        Social History     Tobacco Use   Smoking Status Never Smoker   Smokeless Tobacco Never Used        Social History     Substance and Sexual Activity   Alcohol Use No       Allergies: Norco [hydrocodone-acetaminophen]      Current Outpatient Medications:     metFORMIN (GLUCOPHAGE-XR) 750 MG extended release tablet, Take 750 mg by mouth daily (with breakfast), Disp: , Rfl:     LORazepam (ATIVAN) 0.5 MG tablet, , Disp: , Rfl:     venlafaxine (EFFEXOR) 75 MG tablet, Take 75 mg by mouth daily, Disp: , Rfl:     dicyclomine (BENTYL) 10 MG capsule, Take 1 capsule by mouth 3 times daily as needed (cramps), Disp: 20 capsule, Rfl: 0    ARIPiprazole (ABILIFY) 15 MG tablet, 5 mg , Disp: , Rfl:     traZODone (DESYREL) 50 MG tablet, , Disp: , Rfl:     sertraline (ZOLOFT) 100 MG tablet, Take 200 mg by mouth daily, Disp: , Rfl:     OLANZapine (ZYPREXA) 5 MG tablet, Take 2.5 mg by mouth nightly , Disp: , Rfl:     Social History     Substance and Sexual Activity   Sexual Activity Yes    Partners: Male    Birth control/protection: I.U.D. Last Yearly:  2020    Last pap: 2020    Last HPV: na    Chief Complaint   Patient presents with    Menstrual Problem     Patient presents for removal of Mirena IUD that was placed 2020. Patient concerned about recurrent vaginal infections and weight gain. Discuss options.           PE:  Vital Signs  Blood pressure was also counseled on her preventative health maintenance recommendations and follow-up.         FF time: 15 min      Cinthya Johnson,12/9/2020 2:22 PM

## 2021-01-13 ENCOUNTER — OFFICE VISIT (OUTPATIENT)
Dept: OBGYN | Age: 21
End: 2021-01-13
Payer: OTHER GOVERNMENT

## 2021-01-13 ENCOUNTER — HOSPITAL ENCOUNTER (OUTPATIENT)
Age: 21
Setting detail: SPECIMEN
Discharge: HOME | End: 2021-01-13
Payer: OTHER GOVERNMENT

## 2021-01-13 VITALS
DIASTOLIC BLOOD PRESSURE: 70 MMHG | HEIGHT: 68 IN | SYSTOLIC BLOOD PRESSURE: 122 MMHG | WEIGHT: 178 LBS | BODY MASS INDEX: 26.98 KG/M2

## 2021-01-13 DIAGNOSIS — N90.89 VULVAR IRRITATION: ICD-10-CM

## 2021-01-13 DIAGNOSIS — R30.0 DYSURIA: Primary | ICD-10-CM

## 2021-01-13 DIAGNOSIS — R30.0 DYSURIA: ICD-10-CM

## 2021-01-13 DIAGNOSIS — N89.8 VAGINAL DISCHARGE: ICD-10-CM

## 2021-01-13 DIAGNOSIS — N92.6 IRREGULAR MENSES: ICD-10-CM

## 2021-01-13 LAB
CONTROL: NORMAL
PREGNANCY TEST URINE, POC: NEGATIVE

## 2021-01-13 PROCEDURE — 81002 URINALYSIS NONAUTO W/O SCOPE: CPT | Performed by: ADVANCED PRACTICE MIDWIFE

## 2021-01-13 PROCEDURE — 81025 URINE PREGNANCY TEST: CPT | Performed by: ADVANCED PRACTICE MIDWIFE

## 2021-01-13 PROCEDURE — 99211 OFF/OP EST MAY X REQ PHY/QHP: CPT | Performed by: ADVANCED PRACTICE MIDWIFE

## 2021-01-13 PROCEDURE — 87070 CULTURE OTHR SPECIMN AEROBIC: CPT

## 2021-01-13 PROCEDURE — 99213 OFFICE O/P EST LOW 20 MIN: CPT | Performed by: ADVANCED PRACTICE MIDWIFE

## 2021-01-13 PROCEDURE — 87510 GARDNER VAG DNA DIR PROBE: CPT

## 2021-01-13 PROCEDURE — 87086 URINE CULTURE/COLONY COUNT: CPT

## 2021-01-13 PROCEDURE — 87591 N.GONORRHOEAE DNA AMP PROB: CPT

## 2021-01-13 PROCEDURE — 87660 TRICHOMONAS VAGIN DIR PROBE: CPT

## 2021-01-13 PROCEDURE — 87491 CHLMYD TRACH DNA AMP PROBE: CPT

## 2021-01-13 PROCEDURE — 87480 CANDIDA DNA DIR PROBE: CPT

## 2021-01-13 RX ORDER — CLOTRIMAZOLE AND BETAMETHASONE DIPROPIONATE 10; .64 MG/G; MG/G
CREAM TOPICAL
Qty: 1 TUBE | Refills: 1 | Status: SHIPPED | OUTPATIENT
Start: 2021-01-13

## 2021-01-13 NOTE — PROGRESS NOTES
PROBLEM VISIT     Date of service: 2021    Kay Vieira  Is a 23 y.o.  female    PT's PCP is: Yenni Matthews     : 2000                                             Subjective:       No LMP recorded (lmp unknown). (Menstrual status: Irregular periods). OB History    Para Term  AB Living   1 1 0 1 0 1   SAB TAB Ectopic Molar Multiple Live Births   0 0 0 0 0 1      # Outcome Date GA Lbr Messi/2nd Weight Sex Delivery Anes PTL Lv   1  19 35w5d 09:37 / 00:17 6 lb 2.5 oz (2.792 kg) M Vag-Spont EPI Y MINESH        Social History     Tobacco Use   Smoking Status Never Smoker   Smokeless Tobacco Never Used        Social History     Substance and Sexual Activity   Alcohol Use No       Allergies: Norco [hydrocodone-acetaminophen]      Current Outpatient Medications:     clotrimazole-betamethasone (LOTRISONE) 1-0.05 % cream, Apply topically 2 times daily. , Disp: 1 Tube, Rfl: 1    drospirenone-ethinyl estradiol (ALEC 28) 3-0.03 MG TABS, Take 1 tablet by mouth daily, Disp: 1 packet, Rfl: 3    LORazepam (ATIVAN) 0.5 MG tablet, , Disp: , Rfl:     venlafaxine (EFFEXOR) 75 MG tablet, Take 75 mg by mouth daily, Disp: , Rfl:     metFORMIN (GLUCOPHAGE-XR) 750 MG extended release tablet, Take 750 mg by mouth daily (with breakfast), Disp: , Rfl:     Social History     Substance and Sexual Activity   Sexual Activity Yes    Partners: Male    Birth control/protection: Pill       Last Yearly:  2020    Last pap: 2020    Last HPV: na    Chief Complaint   Patient presents with    Vaginal Pain     C/O vaginal pain, sore ? for 2 days. Yellow / white vaginal discharge. Irregular menses, LMP ? had IUD removed in Dec.         PE:  Vital Signs  Blood pressure 122/70, height 5' 8\" (1.727 m), weight 178 lb (80.7 kg), not currently breastfeeding. Estimated body mass index is 27.06 kg/m² as calculated from the following:    Height as of this encounter: 5' 8\" (1.727 m). Weight as of this encounter: 178 lb (80.7 kg). 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 Had IUD out in December due to recurrent vaginal infections and weight gain. PT denies fever, chills, nausea and vomiting       Objective   No acute distress  Excellent communications  Well-nourished  Lymphatic:   no lymphadenopathy      GI: Abdomen soft, non-tender. BS normal. No masses,  No organomegaly           Extremities: normal strength, tone, and muscle mass      Pelvic Exam: GENITAL/URINARY:  External Genitalia:  General appearance; normal, Hair distribution; normal, Lesions absent  Urethral Meatus:  Size normal, Location normal, Lesions absent, Prolapse absent  Urethra: Fullness absent, Masses absent  Bladder:  Fullness absent, Masses absent, Tenderness absent, Cystocele absent  Vagina:  General appearance minimal errythema, Estrogen effect normal, Discharge thin yellow/ white, Lesions absent, Pelvic support normal  Cervix:  General appearance normal, Lesions absent, Discharge absent, Tenderness absent, Enlargement absent, Nodularity absent  Uterus:  Size normal, Tenderness absent  Adenexa: Masses absent  Anus/Perineum:  Lesions absent and Masses absent                                                       Results reviewed today:    No results found for this visit on 01/13/21. Assessment and Plan          Diagnosis Orders   1. Dysuria  POCT Urinalysis no Micro    Culture, Urine   2. Irregular menses  POCT urine pregnancy   3. Vaginal discharge  VAGINITIS DNA PROBE    C.trachomatis N.gonorrhoeae DNA    Culture, Genital   4. Vulvar irritation  clotrimazole-betamethasone (LOTRISONE) 1-0.05 % cream             I am having Joann Vieira start on clotrimazole-betamethasone. I am also having her maintain her LORazepam, venlafaxine, metFORMIN, and drospirenone-ethinyl estradiol. Return if symptoms worsen or fail to improve, for will call with results. There are no Patient Instructions on file for this visit. Over 50% of time spent on counseling and care coordination on: see assessment and plan,  She was also counseled on her preventative health maintenance recommendations and follow-up.         FF time: 20 min      Ana Johnson,1/13/2021 3:38 PM

## 2021-01-14 DIAGNOSIS — B96.89 BV (BACTERIAL VAGINOSIS): Primary | ICD-10-CM

## 2021-01-14 DIAGNOSIS — B37.31 YEAST VAGINITIS: ICD-10-CM

## 2021-01-14 DIAGNOSIS — N76.0 BV (BACTERIAL VAGINOSIS): Primary | ICD-10-CM

## 2021-01-14 LAB
C TRACH DNA GENITAL QL NAA+PROBE: NEGATIVE
CULTURE: NORMAL
DIRECT EXAM: ABNORMAL
Lab: ABNORMAL
Lab: NORMAL
N. GONORRHOEAE DNA: NEGATIVE
SPECIMEN DESCRIPTION: ABNORMAL
SPECIMEN DESCRIPTION: NORMAL
SPECIMEN DESCRIPTION: NORMAL

## 2021-01-14 RX ORDER — FLUCONAZOLE 150 MG/1
TABLET ORAL
Qty: 3 TABLET | Refills: 0 | Status: SHIPPED | OUTPATIENT
Start: 2021-01-14

## 2021-01-14 RX ORDER — METRONIDAZOLE 500 MG/1
500 TABLET ORAL 2 TIMES DAILY
Qty: 14 TABLET | Refills: 0 | Status: SHIPPED | OUTPATIENT
Start: 2021-01-14 | End: 2021-01-21

## 2021-01-16 LAB
CULTURE: ABNORMAL
Lab: ABNORMAL
SPECIMEN DESCRIPTION: ABNORMAL

## 2021-01-27 ENCOUNTER — OFFICE VISIT (OUTPATIENT)
Dept: OBGYN | Age: 21
End: 2021-01-27
Payer: OTHER GOVERNMENT

## 2021-01-27 ENCOUNTER — HOSPITAL ENCOUNTER (OUTPATIENT)
Age: 21
Setting detail: SPECIMEN
Discharge: HOME | End: 2021-01-27
Payer: OTHER GOVERNMENT

## 2021-01-27 VITALS
DIASTOLIC BLOOD PRESSURE: 62 MMHG | BODY MASS INDEX: 26.52 KG/M2 | WEIGHT: 175 LBS | SYSTOLIC BLOOD PRESSURE: 118 MMHG | HEIGHT: 68 IN

## 2021-01-27 DIAGNOSIS — N34.0: ICD-10-CM

## 2021-01-27 DIAGNOSIS — N89.8 VAGINAL DISCHARGE: ICD-10-CM

## 2021-01-27 DIAGNOSIS — N89.8 VAGINAL DISCHARGE: Primary | ICD-10-CM

## 2021-01-27 LAB
DIRECT EXAM: NORMAL
Lab: NORMAL
SPECIMEN DESCRIPTION: NORMAL

## 2021-01-27 PROCEDURE — 87480 CANDIDA DNA DIR PROBE: CPT

## 2021-01-27 PROCEDURE — 87660 TRICHOMONAS VAGIN DIR PROBE: CPT

## 2021-01-27 PROCEDURE — 87070 CULTURE OTHR SPECIMN AEROBIC: CPT

## 2021-01-27 PROCEDURE — 87205 SMEAR GRAM STAIN: CPT

## 2021-01-27 PROCEDURE — 99211 OFF/OP EST MAY X REQ PHY/QHP: CPT | Performed by: ADVANCED PRACTICE MIDWIFE

## 2021-01-27 PROCEDURE — 87510 GARDNER VAG DNA DIR PROBE: CPT

## 2021-01-27 PROCEDURE — 87591 N.GONORRHOEAE DNA AMP PROB: CPT

## 2021-01-27 PROCEDURE — 87491 CHLMYD TRACH DNA AMP PROBE: CPT

## 2021-01-27 PROCEDURE — 86403 PARTICLE AGGLUT ANTBDY SCRN: CPT

## 2021-01-27 PROCEDURE — 56405 I&D VULVA/PERINEAL ABSCESS: CPT | Performed by: ADVANCED PRACTICE MIDWIFE

## 2021-01-27 RX ORDER — CEPHALEXIN 500 MG/1
500 CAPSULE ORAL 3 TIMES DAILY
Qty: 21 CAPSULE | Refills: 0 | Status: SHIPPED | OUTPATIENT
Start: 2021-01-27

## 2021-01-27 NOTE — PROGRESS NOTES
PROBLEM VISIT     Date of service: 2021    Madhu Balderas  Is a 23 y.o.  female    PT's PCP is: Jesús Espinoza     : 2000                                             Subjective:       No LMP recorded (lmp unknown). (Menstrual status: Irregular periods). OB History    Para Term  AB Living   1 1 0 1 0 1   SAB TAB Ectopic Molar Multiple Live Births   0 0 0 0 0 1      # Outcome Date GA Lbr Messi/2nd Weight Sex Delivery Anes PTL Lv   1  19 35w5d 09:37 / 00:17 6 lb 2.5 oz (2.792 kg) M Vag-Spont EPI Y MINESH        Social History     Tobacco Use   Smoking Status Never Smoker   Smokeless Tobacco Never Used        Social History     Substance and Sexual Activity   Alcohol Use No       Allergies: Norco [hydrocodone-acetaminophen]      Current Outpatient Medications:     drospirenone-ethinyl estradiol (ALEC 28) 3-0.03 MG TABS, Take 1 tablet by mouth daily, Disp: 1 packet, Rfl: 3    venlafaxine (EFFEXOR) 75 MG tablet, Take 75 mg by mouth daily, Disp: , Rfl:     fluconazole (DIFLUCAN) 150 MG tablet, Take 1 tablet daily for 3 days. (Patient not taking: Reported on 2021), Disp: 3 tablet, Rfl: 0    clotrimazole-betamethasone (LOTRISONE) 1-0.05 % cream, Apply topically 2 times daily. (Patient not taking: Reported on 2021), Disp: 1 Tube, Rfl: 1    metFORMIN (GLUCOPHAGE-XR) 750 MG extended release tablet, Take 750 mg by mouth daily (with breakfast), Disp: , Rfl:     LORazepam (ATIVAN) 0.5 MG tablet, , Disp: , Rfl:     Social History     Substance and Sexual Activity   Sexual Activity Not Currently    Partners: Male    Birth control/protection: Pill       Last Yearly:  2020    Last pap: 2020    Last HPV: na    Chief Complaint   Patient presents with    Vaginal Itching     C/O vaginal itch, irritation and odor. C/O dime size vulvar lump for approx. 5 days, painful and getting larger.          PE:  Vital Signs Blood pressure 118/62, height 5' 8\" (1.727 m), weight 175 lb (79.4 kg), not currently breastfeeding. Estimated body mass index is 26.61 kg/m² as calculated from the following:    Height as of this encounter: 5' 8\" (1.727 m). Weight as of this encounter: 175 lb (79.4 kg). NURSE: Kimberly LPN    HPI: here for lump in the vulva, painful and increase in vaginal discharge     PT denies fever, chills, nausea and vomiting       Objective   No acute distress  Excellent communications  Well-nourished   Pelvic Exam: GENITAL/URINARY:  External Genitalia:  Right skenes gland enlargement, 1 cm hard ball, tender  Urethral Meatus:  Size normal, Location normal, Lesions absent, Prolapse absent  Urethra: Fullness absent, Masses absent  Bladder:  Fullness absent, Masses absent, Tenderness absent, Cystocele absent  Vagina:  General appearance normal, Estrogen effect normal, Discharge absent, Lesions absent, Pelvic support normal  Cervix:  General appearance normal, Lesions absent, Discharge absent, Tenderness absent, Enlargement absent, Nodularity absent  Uterus:  Size normal, Tenderness absent  Adenexa: Masses absent, Tenderness absent  Anus/Perineum:  Lesions absent and Masses absent                                Ariane Michellealex  [id number]  [address] [facility] [address]  2000    Procedure: I & D    Position: Lithotomy    Consent: Provider reviewed procedure with member. Consent form printed and signed? Verbal x2    Pregnancy Test: N/A    Post Procedure ambulatory    Right skenes gland overlying tissue cleaned with betadine, injected with 1 % lidocaine wheel, incised with #10 blade, expressed for copious amount of yellow pus, cultures taken, cleaned off and care instructions reviewed                    Results reviewed today:    No results found for this visit on 01/27/21. Assessment and Plan          Diagnosis Orders   1. Vaginal discharge  VAGINITIS DNA PROBE   2.  Abscess, [place]'s gland  Culture, Wound C.trachomatis N.gonorrhoeae DNA    cephALEXin (KEFLEX) 500 MG capsule    CO I&D OF VULVA/PERINEUM ABSCESS             I am having Joann Vieira start on cephALEXin. I am also having her maintain her LORazepam, venlafaxine, metFORMIN, drospirenone-ethinyl estradiol, clotrimazole-betamethasone, and fluconazole. Return if symptoms worsen or fail to improve, for will call with results. There are no Patient Instructions on file for this visit. Over 50% of time spent on counseling and care coordination on: see assessment and plan,  She was also counseled on her preventative health maintenance recommendations and follow-up.         FF time: 20 min      Viry Johnson,1/27/2021 9:19 AM

## 2021-01-28 LAB
C TRACH DNA GENITAL QL NAA+PROBE: NEGATIVE
N. GONORRHOEAE DNA: NEGATIVE
SPECIMEN DESCRIPTION: NORMAL

## 2021-01-29 LAB
CULTURE: ABNORMAL
DIRECT EXAM: ABNORMAL
DIRECT EXAM: ABNORMAL
Lab: ABNORMAL
SPECIMEN DESCRIPTION: ABNORMAL

## 2021-02-12 NOTE — PLAN OF CARE
Problem: Anxiety:  Goal: Level of anxiety will decrease  Description  Level of anxiety will decrease  7/3/2019 1923 by Srinivas Ovalle RN  Outcome: Completed  7/3/2019 0830 by Srinivas Ovalle RN  Outcome: Ongoing     Problem: Breathing Pattern - Ineffective:  Goal: Able to breathe comfortably  Description  Able to breathe comfortably  7/3/2019 1923 by Srinivas Ovalle RN  Outcome: Completed  7/3/2019 0830 by Srinivas Ovalle RN  Outcome: Ongoing     Problem: Fluid Volume - Imbalance:  Goal: Absence of imbalanced fluid volume signs and symptoms  Description  Absence of imbalanced fluid volume signs and symptoms  7/3/2019 1923 by Srinivas Ovalle RN  Outcome: Completed  7/3/2019 0830 by Srinivas Ovalle RN  Outcome: Ongoing  Goal: Absence of intrapartum hemorrhage signs and symptoms  Description  Absence of intrapartum hemorrhage signs and symptoms  7/3/2019 1923 by Srinivas Ovalle RN  Outcome: Completed  7/3/2019 0830 by Srinivas Ovalle RN  Outcome: Ongoing     Problem: Infection - Intrapartum Infection:  Goal: Will show no infection signs and symptoms  Description  Will show no infection signs and symptoms  7/3/2019 1923 by Srinivas Ovalle RN  Outcome: Completed  7/3/2019 0830 by Srinivas Ovalle RN  Outcome: Ongoing     Problem: Labor Process - Prolonged:  Goal: Labor progression, first stage, within specified pattern  Description  Labor progression, first stage, within specified pattern  7/3/2019 1923 by Srinivas Ovalle RN  Outcome: Completed  7/3/2019 0830 by Srinivas Ovalel RN  Outcome: Ongoing  Goal: Labor progession, second stage, within specified pattern  Description  Labor progession, second stage, within specified pattern  7/3/2019 1923 by Srinivas Ovalle RN  Outcome: Completed  7/3/2019 0830 by Srinivas Ovalle RN  Outcome: Ongoing  Goal: Uterine contractions within specified parameters  Description  Uterine contractions within specified parameters  7/3/2019 1923 by Robert Gonzalez RN  Outcome: Ongoing  Goal: Control of chronic pain  Description  Control of chronic pain  7/3/2019 1923 by Robert Gonzalez RN  Outcome: Completed  7/3/2019 0830 by Robert Gonzalez RN  Outcome: Ongoing Patient's belongings returned

## 2021-03-09 ENCOUNTER — TELEMEDICINE (OUTPATIENT)
Dept: OBGYN | Age: 21
End: 2021-03-09
Payer: OTHER GOVERNMENT

## 2021-03-09 DIAGNOSIS — N92.6 IRREGULAR MENSES: Primary | ICD-10-CM

## 2021-03-09 PROCEDURE — 99212 OFFICE O/P EST SF 10 MIN: CPT | Performed by: ADVANCED PRACTICE MIDWIFE

## 2021-03-09 ASSESSMENT — ENCOUNTER SYMPTOMS
ABDOMINAL PAIN: 0
NAUSEA: 0

## 2021-03-09 NOTE — PROGRESS NOTES
3/9/2021    TELEHEALTH EVALUATION -- Audio/Visual (During BNECJ-90 public health emergency)    HPI: Medication review, patient currently taking OCP LMP end of . Patient missed 2 pills and has now had faint positive home pregnacy test,had brown vaginal discharge several days ago. Chaim Pretty (:  2000) has requested an audio/video evaluation for the following concern(s):    Faint positive pregnancy test     Review of Systems   Constitutional: Negative. Gastrointestinal: Negative for abdominal pain and nausea. Prior to Visit Medications    Medication Sig Taking? Authorizing Provider   LORazepam (ATIVAN) 0.5 MG tablet  Yes Historical Provider, MD   venlafaxine (EFFEXOR) 75 MG tablet Take 75 mg by mouth daily Yes Historical Provider, MD   cephALEXin (KEFLEX) 500 MG capsule Take 1 capsule by mouth 3 times daily  Patient not taking: Reported on 3/9/2021  Bulmaro Blush, APRN - CNM   fluconazole (DIFLUCAN) 150 MG tablet Take 1 tablet daily for 3 days. Patient not taking: Reported on 2021  Bulmaro Blush, APRN - CNM   clotrimazole-betamethasone (LOTRISONE) 1-0.05 % cream Apply topically 2 times daily.   Patient not taking: Reported on 2021  Bulmaro Blush, APRN - CNM   metFORMIN (GLUCOPHAGE-XR) 750 MG extended release tablet Take 750 mg by mouth daily (with breakfast)  Historical Provider, MD   drospirenone-ethinyl estradiol (ALEC 28) 3-0.03 MG TABS Take 1 tablet by mouth daily  Patient not taking: Reported on 3/9/2021  Bulmaro Blush, APRN - CNM       Social History     Tobacco Use    Smoking status: Never Smoker    Smokeless tobacco: Never Used   Substance Use Topics    Alcohol use: No    Drug use: No        Past Surgical History:   Procedure Laterality Date    GALLBLADDER SURGERY      KNEE ARTHROSCOPY Right     KNEE ARTHROSCOPY Right 2016    Medial Menisectomy    KNEE SURGERY  [id number]   ,   Family History   Problem Relation Age of Onset    Heart Surgery Maternal Grandfather     Lung Cancer Maternal Grandfather     Hypertension Maternal Grandfather     Deep Vein Thrombosis Maternal Grandfather     Other Other         No family h/o ovarian or breast cancer. PHYSICAL EXAMINATION:          Constitutional: [x] Appears well-developed and well-nourished [x] No apparent distress      [] Abnormal-   Mental status  [x] Alert and awake  [x] Oriented to person/place/time [x]Able to follow commands      Eyes:  EOM    [x]  Normal  [] Abnormal-  Sclera  [x]  Normal  [] Abnormal -         Discharge []  None visible  [] Abnormal -    HENT:   [x] Normocephalic, atraumatic. [] Abnormal   [] Mouth/Throat: Mucous membranes are moist.     External Ears [x] Normal  [] Abnormal-     Neck: [x] No visualized mass     Pulmonary/Chest: [x] Respiratory effort normal.  [x] No visualized signs of difficulty breathing or respiratory distress        [] Abnormal-      Musculoskeletal:   [] Normal gait with no signs of ataxia         [x] Normal range of motion of neck        [] Abnormal-       Neurological:        [x] No Facial Asymmetry (Cranial nerve 7 motor function) (limited exam to video visit)          [x] No gaze palsy        [] Abnormal-         Skin:        [] No significant exanthematous lesions or discoloration noted on facial skin         [] Abnormal-            Psychiatric:       [x] Normal Affect [] No Hallucinations        [] Abnormal-     Other pertinent observable physical exam findings-     ASSESSMENT/PLAN:    1. Irregular menses    [facility] \"[facility] pregnancy test\" if negative continue ocps, if positive stop and call back and we will initiate OB care when she returns next week    Return if symptoms worsen or fail to improve. Alexei Amaral, was evaluated through a synchronous (real-time) audio-video encounter. The patient (or guardian if applicable) is aware that this is a billable service.  Verbal consent to proceed has been obtained within the past 12 months. The visit was conducted pursuant to the emergency declaration under the [address] [place] [address] and the [employer] General Act. Patient identification was verified, and a caregiver was present when appropriate. The patient was located in a state where the provider was credentialed to provide care. Total time spent on this encounter: [address] [place] [address], [address] [place] [address] on 3/9/2021 at 11:42 AM    An electronic signature was used to authenticate this note.

## 2021-03-15 ENCOUNTER — HOSPITAL ENCOUNTER (OUTPATIENT)
Age: 21
Discharge: HOME | End: 2021-03-15
Payer: OTHER GOVERNMENT

## 2021-03-15 ENCOUNTER — TELEPHONE (OUTPATIENT)
Dept: OBGYN | Age: 21
End: 2021-03-15

## 2021-03-15 DIAGNOSIS — N92.6 IRREGULAR MENSES: Primary | ICD-10-CM

## 2021-03-15 DIAGNOSIS — N92.6 IRREGULAR MENSES: ICD-10-CM

## 2021-03-15 LAB — HCG QUANTITATIVE: <1 IU/L

## 2021-03-15 PROCEDURE — 84702 CHORIONIC GONADOTROPIN TEST: CPT

## 2021-03-15 PROCEDURE — 36415 COLL VENOUS BLD VENIPUNCTURE: CPT

## 2021-03-15 NOTE — TELEPHONE ENCOUNTER
Patient calls with concerns of possible pregnancy, LMP end of January. Patient was out of state and had negative pregnancy test at urgent care however was not comfortable with care. Order added for serum HCG.

## 2021-03-16 ENCOUNTER — TELEPHONE (OUTPATIENT)
Dept: OBGYN | Age: 21
End: 2021-03-16

## 2021-03-17 ENCOUNTER — HOSPITAL ENCOUNTER (OUTPATIENT)
Age: 21
Setting detail: SPECIMEN
Discharge: HOME | End: 2021-03-17
Payer: OTHER GOVERNMENT

## 2021-03-17 ENCOUNTER — OFFICE VISIT (OUTPATIENT)
Dept: OBGYN | Age: 21
End: 2021-03-17
Payer: OTHER GOVERNMENT

## 2021-03-17 VITALS
DIASTOLIC BLOOD PRESSURE: 74 MMHG | BODY MASS INDEX: 26.89 KG/M2 | HEIGHT: 68 IN | SYSTOLIC BLOOD PRESSURE: 118 MMHG | WEIGHT: 177.4 LBS

## 2021-03-17 DIAGNOSIS — N89.8 VAGINAL DISCHARGE: ICD-10-CM

## 2021-03-17 DIAGNOSIS — N92.6 IRREGULAR MENSES: Primary | ICD-10-CM

## 2021-03-17 PROCEDURE — 99211 OFF/OP EST MAY X REQ PHY/QHP: CPT | Performed by: ADVANCED PRACTICE MIDWIFE

## 2021-03-17 PROCEDURE — 87070 CULTURE OTHR SPECIMN AEROBIC: CPT

## 2021-03-17 PROCEDURE — 99213 OFFICE O/P EST LOW 20 MIN: CPT | Performed by: ADVANCED PRACTICE MIDWIFE

## 2021-03-17 NOTE — PROGRESS NOTES
PROBLEM VISIT     Date of service: 3/17/2021    Vani Adan  Is a 23 y.o.  female    PT's PCP is: Mallory Arteaga     : 2000                                             Subjective:       Patient's last menstrual period was 2021 (approximate). OB History    Para Term  AB Living   1 1 0 1 0 1   SAB TAB Ectopic Molar Multiple Live Births   0 0 0 0 0 1      # Outcome Date GA Lbr Messi/2nd Weight Sex Delivery Anes PTL Lv   1  19 35w5d 09:37 / 00:17 6 lb 2.5 oz (2.792 kg) M Vag-Spont EPI Y MINESH        Social History     Tobacco Use   Smoking Status Never Smoker   Smokeless Tobacco Never Used        Social History     Substance and Sexual Activity   Alcohol Use No       Allergies: Norco [hydrocodone-acetaminophen]      Current Outpatient Medications:     LORazepam (ATIVAN) 0.5 MG tablet, , Disp: , Rfl:     venlafaxine (EFFEXOR) 75 MG tablet, Take 75 mg by mouth daily, Disp: , Rfl:     cephALEXin (KEFLEX) 500 MG capsule, Take 1 capsule by mouth 3 times daily (Patient not taking: Reported on 3/9/2021), Disp: 21 capsule, Rfl: 0    fluconazole (DIFLUCAN) 150 MG tablet, Take 1 tablet daily for 3 days. (Patient not taking: Reported on 2021), Disp: 3 tablet, Rfl: 0    clotrimazole-betamethasone (LOTRISONE) 1-0.05 % cream, Apply topically 2 times daily. (Patient not taking: Reported on 2021), Disp: 1 Tube, Rfl: 1    metFORMIN (GLUCOPHAGE-XR) 750 MG extended release tablet, Take 750 mg by mouth daily (with breakfast), Disp: , Rfl:     drospirenone-ethinyl estradiol (ALEC 28) 3-0.03 MG TABS, Take 1 tablet by mouth daily (Patient not taking: Reported on 3/9/2021), Disp: 1 packet, Rfl: 3    Social History     Substance and Sexual Activity   Sexual Activity Yes    Partners: Male       Last Yearly:  2020    Last pap: 2020    Last HPV: never    Chief Complaint   Patient presents with    Vaginal Discharge     C/O vaginal discharge approx. 1/12 weeks ago. LMP end of Jan.  Patient had negative serum HCG. Patiient was taking OCP however stopped taking  2 weeks ago. PE:  Vital Signs  Blood pressure 118/74, height 5' 8\" (1.727 m), weight 177 lb 6.4 oz (80.5 kg), last menstrual period 01/27/2021, not currently breastfeeding. Estimated body mass index is 26.97 kg/m² as calculated from the following:    Height as of this encounter: 5' 8\" (1.727 m). Weight as of this encounter: 177 lb 6.4 oz (80.5 kg). No data recorded    NURSE: Merly DUVALL    HPI: discharge from two weeks ago looks like spinnbarkeit by picture patient has, had last unprotected sex 3 days ago, feels nauseated and fatigued     PT denies fever, chills, nausea and vomiting       Objective   No acute distress  Excellent communications  Well-nourished     Pelvic Exam: GENITAL/URINARY:  External Genitalia:  General appearance; normal, Hair distribution; normal, Lesions absent  Urethral Meatus:  Size normal, Location normal, Lesions absent, Prolapse absent  Urethra: Fullness absent, Masses absent  Bladder:  Fullness absent, Masses absent, Tenderness absent, Cystocele absent  Vagina:  General appearance normal, Estrogen effect normal, Discharge absent, Lesions absent, Pelvic support normal  Cervix:  General appearance normal, Lesions absent, Discharge absent, Tenderness absent, Enlargement absent, Nodularity absent  Uterus:  Size normal, Tenderness absent  [place]: Masses absent, Tenderness absent  Anus/Perineum:  Lesions absent and Masses absent                                                 Results reviewed today:    No results found for this visit on 03/17/21. Assessment and Plan          Diagnosis Orders   1. Irregular menses  HCG, Quantitative, Pregnancy   2. Vaginal discharge  Culture, Genital     If negative hcg in one week, get withdrawal bleed         I am having Joann Vieira maintain her LORazepam, venlafaxine, metFORMIN, drospirenone-ethinyl estradiol, clotrimazole-betamethasone, fluconazole, and cephALEXin. Return if symptoms worsen or fail to improve, for will call with results. There are no Patient Instructions on file for this visit. Over 50% of time spent on counseling and care coordination on: see assessment and plan,  She was also counseled on her preventative health maintenance recommendations and follow-up.         FF time: 20 min      Dorys Johnson,3/17/2021 1:58 PM

## 2021-03-20 LAB
CULTURE: NORMAL
Lab: NORMAL
SPECIMEN DESCRIPTION: NORMAL

## 2022-01-31 ENCOUNTER — HOSPITAL ENCOUNTER (OUTPATIENT)
Age: 22
Discharge: HOME | End: 2022-01-31
Payer: COMMERCIAL

## 2022-01-31 DIAGNOSIS — N92.6 IRREGULAR MENSES: ICD-10-CM

## 2022-01-31 LAB — HCG QUANTITATIVE: <1 IU/L

## 2022-01-31 PROCEDURE — 36415 COLL VENOUS BLD VENIPUNCTURE: CPT

## 2022-01-31 PROCEDURE — 84702 CHORIONIC GONADOTROPIN TEST: CPT

## 2024-03-02 ENCOUNTER — HOSPITAL ENCOUNTER (EMERGENCY)
Age: 24
Discharge: HOME | End: 2024-03-02
Payer: COMMERCIAL

## 2024-03-02 VITALS
RESPIRATION RATE: 18 BRPM | OXYGEN SATURATION: 100 % | HEART RATE: 78 BPM | TEMPERATURE: 97.9 F | DIASTOLIC BLOOD PRESSURE: 76 MMHG | SYSTOLIC BLOOD PRESSURE: 124 MMHG

## 2024-03-02 VITALS — BODY MASS INDEX: 24.2 KG/M2

## 2024-03-02 DIAGNOSIS — N39.0: Primary | ICD-10-CM

## 2024-03-02 DIAGNOSIS — R10.9: ICD-10-CM

## 2024-03-02 DIAGNOSIS — F17.210: ICD-10-CM

## 2024-03-02 LAB
ADD MANUAL DIFF: NO
ALANINE AMINOTRANSFERASE: 20 U/L (ref 14–59)
ALBUMIN GLOBULIN RATIO: 1.1
ALBUMIN LEVEL: 4.1 G/DL (ref 3.4–5)
ALKALINE PHOSPHATASE: 100 U/L (ref 46–116)
ANION GAP: 11.6
ASPARTATE AMINO TRANSFERASE: 17 U/L (ref 15–37)
BLOOD UREA NITROGEN: 12 MG/DL (ref 7–18)
CALCIUM: 8.8 MG/DL (ref 8.5–10.1)
CARBON DIOXIDE: 29 MMOL/L (ref 21–32)
CAST SEEN?: (no result) #/LPF
CHLORIDE: 102 MMOL/L (ref 98–107)
ESTIMATED GFR (AFRICAN AMERICA: >60 (ref 60–?)
ESTIMATED GFR (NON-AFRICAN AME: >60 (ref 60–?)
GLOBULIN: 3.8 G/DL
GLUCOSE URINE UA: NEGATIVE MG/DL
GLUCOSE: 72 MG/DL (ref 74–106)
HCG QUALITATIVE URINE*: NEGATIVE
HEMATOCRIT: 35.7 % (ref 36–48)
HEMOGLOBIN: 12.2 G/DL (ref 12–16)
IMMATURE GRANULOCYTES ABS AUTO: 0.02 10^3/UL (ref 0–0.03)
IMMATURE GRANULOCYTES PCT AUTO: 0.3 % (ref 0–0.5)
LYMPHOCYTES  ABSOLUTE AUTO: 1.9 10^3/UL (ref 1.2–3.8)
MCV RBC: 87.9 FL (ref 81–99)
MEAN CORPUSCULAR HEMOGLOBIN: 30 PG (ref 26.7–34)
MEAN CORPUSCULAR HGB CONC: 34.2 G/DL (ref 29.9–35.2)
PLATELET COUNT: 205 10^3/UL (ref 150–450)
POTASSIUM: 3.6 MMOL/L (ref 3.5–5.1)
RED BLOOD COUNT: 4.06 10^6/UL (ref 4.2–5.4)
SODIUM: 139 MMOL/L (ref 136–145)
TOTAL PROTEIN: 7.9 G/DL (ref 6.4–8.2)
URINE CULTURE INDICATED: YES
WHITE BLOOD COUNT: 6 10^3/UL (ref 4–11)

## 2024-03-02 PROCEDURE — 99285 EMERGENCY DEPT VISIT HI MDM: CPT

## 2024-03-02 PROCEDURE — 96375 TX/PRO/DX INJ NEW DRUG ADDON: CPT

## 2024-03-02 PROCEDURE — 96374 THER/PROPH/DIAG INJ IV PUSH: CPT

## 2024-03-02 PROCEDURE — 36415 COLL VENOUS BLD VENIPUNCTURE: CPT

## 2024-03-02 PROCEDURE — 85025 COMPLETE CBC W/AUTO DIFF WBC: CPT

## 2024-03-02 PROCEDURE — 81001 URINALYSIS AUTO W/SCOPE: CPT

## 2024-03-02 PROCEDURE — 80053 COMPREHEN METABOLIC PANEL: CPT

## 2024-03-02 PROCEDURE — 83605 ASSAY OF LACTIC ACID: CPT

## 2024-03-02 PROCEDURE — 74176 CT ABD & PELVIS W/O CONTRAST: CPT

## 2024-03-02 PROCEDURE — 87086 URINE CULTURE/COLONY COUNT: CPT

## 2024-03-02 PROCEDURE — 84703 CHORIONIC GONADOTROPIN ASSAY: CPT

## 2024-03-02 NOTE — ED.GENADUL1
HPI - General Adult
General
Chief complaint: Abdominal Pain
Stated complaint: LOWER BACK PAIN
Time Seen by Provider: 03/02/24 17:36
History of Present Illness
HPI narrative: 
Patient is a 23-year-old female who presents to the emergency department for bilateral flank pain and pelvic pressure with urination that began yesterday.  She was seen by her PCP office and her urine was negative, per the patient although she was 
prescribed Bactrim for an unknown reason.  She has had no fevers, chills.  She reports nausea with no vomiting.  She states she noticed blood in her urine today. She is not concerned for pregnancy.  No medications taken prior to arrival today for 
pain.
Related Data
Previous Rx's

 Medication  Instructions  Recorded
ketorolac 10 mg tablet 10 mg PO TID PRN pain #10 tabs 03/02/24
ondansetron 4 mg disintegrating 4 mg PO Q6H PRN nausea and 03/02/24
tablet vomiting #12 tabs 


Allergies

Allergy/AdvReac Type Severity Reaction Status Date / Time
No Known Drug Allergies Allergy   Verified 03/02/24 17:37



Review of Systems
ROS  
 Constitutional Denies: fever or chills   
 Ears, nose, mouth, and throat Denies: throat pain or nasal congestion   
 Cardiovascular Denies: chest pain   
 Respiratory Denies: shortness of breath or cough   
 Gastrointestinal Reports: nausea; Denies: vomiting or diarrhea   
 Genitourinary Reports: painful urination and pelvic pain   
 Musculoskeletal Reports: back pain; Denies: neck pain   
 Integumentary/Breast Denies: rash   
 Neurological Denies: headache   
 Hematologic/Lymphatic Denies: easy bruising or easy bleeding   

[facility]
Social History (Reviewed 03/02/24 @ 18:00 by ISABEL Mackey)
Smoking status:  Light tobacco smoker 



Exam
Narrative
Exam Narrative: 
Gen.: Awake, alert, in no distress
Head: Normocephalic, atraumatic
ENT: Moist mucous membranes
Respiratory: No respiratory distress, lungs clear bilaterally
Cardio: Regular rate and rhythm
Gastrointestinal: Abdomen is soft, nondistended and nontender to palpation
Back: No CVA tenderness
Extremities: Moves extremities equally
Psych: Normal mood and affect
Neuro: No focal neuro deficit
Skin: Warm, dry, intact

Constitutional
Vital Signs, click to edit/add: 

Last Vital Signs

Temp  97.9 F   03/02/24 17:37
Pulse  78   03/02/24 17:37
Resp  18   03/02/24 17:37
BP  124/76   03/02/24 17:37
Pulse Ox  100   03/02/24 17:37
O2 Del Method  Room Air  03/02/24 17:37




Course
Vital Signs
Vital signs: 

Vital Signs

Temperature  97.9 F   03/02/24 17:37
Pulse Rate  78   03/02/24 17:37
Respiratory Rate  18   03/02/24 17:37
Blood Pressure  124/76   03/02/24 17:37
Pulse Oximetry  100   03/02/24 17:37
Oxygen Delivery Method  Room Air  03/02/24 17:37



Temperature  97.9 F   03/02/24 17:37
Pulse Rate  78   03/02/24 17:37
Respiratory Rate  18   03/02/24 17:37
Blood Pressure  124/76   03/02/24 17:37
Pulse Oximetry  100   03/02/24 17:37
Oxygen Delivery Method  Room Air  03/02/24 17:37




Medical Decision Making
MDM Narrative
Medical decision making narrative: 
Lab studies within normal limits, urine specimen with mild urinary tract infection.  Patient given IV fluids, Toradol, Zofran in the ER and discharged home to continue her Bactrim, she is given Toradol and Zofran as needed for comfort.  Follow-up 
with PCP.  CT scan with no evidence of acute abnormalities.  Return to the ER if symptoms change or worsen
Medical Records
Medical records reviewed: Yes I reviewed the patient's medical records
Lab Data
Lab results reviewed: Yes I reviewed the patient's lab results
Labs: 

Lab Results

  03/02/24 03/02/24 Range/Units
  17:44 18:00 
WBC   6.0  (4.0-11.0)  10^3/uL
RBC   4.06 L  (4.20-5.40)  10^6/uL
Hgb   12.2  (12.0-16.0)  g/dL
Hct   35.7 L  (36.0-48.0)  %
MCV   87.9  (81.0-99.0)  fL
MCH   30.0  (26.7-34.0)  pg
MCHC   34.2  (29.9-35.2)  g/dL
RDW   12.1  (11.0-15.0)  %
Plt Count   205  (150-450)  10^3/uL
MPV   9.8  (9.5-13.5)  fL
Neut % (Auto)   58.4  (43.0-75.0)  %
Lymph % (Auto)   31.1  (20.5-60.0)  %
Mono % (Auto)   6.5  (1.7-12.0)  %
Eos % (Auto)   2.7  (0.9-7.0)  %
Baso % (Auto)   1.0  (0.2-2.0)  %
Neut # (Auto)   3.5  (1.4-6.5)  10^3/uL
Lymph # (Auto)   1.9  (1.2-3.8)  10^3/uL
Mono # (Auto)   0.4  (0.3-0.8)  10^3/uL
Eos # (Auto)   0.2  (0.0-0.7)  10^3/uL
Baso # (Auto)   0.1  (0.0-0.1)  10^3/uL
Abs Immat Gran (auto)   0.02  (0.00-0.03)  10^3/uL
Imm/Tot Granulo (auto)   0.3  (0.0-0.5)  %
Sodium   139  (136-145)  mmol/L
Potassium   3.6  (3.5-5.1)  mmol/L
Chloride   102  ()  mmol/L
Carbon Dioxide   29.0  (21.0-32.0)  mmol/L
Anion Gap   11.6  
BUN   12.0  (7.0-18.0)  mg/dL
Creatinine   0.85  (0.55-1.02)  mg/dL
Est GFR ( Amer)   >60  (>=60)  
Est GFR (Non-Af Amer)   >60  (>=60)  
BUN/Creatinine Ratio   14.1  
Glucose   72 L  ()  mg/dL
Calcium   8.8  (8.5-10.1)  mg/dL
Total Bilirubin   0.7  (0.2-1.0)  mg/dL
AST   17  (15-37)  U/L
ALT   20  (14-59)  U/L
Alkaline Phosphatase   100  ()  U/L
Total Protein   7.9  (6.4-8.2)  g/dL
Albumin   4.1  (3.4-5.0)  g/dL
Globulin   3.8  g/dL
Albumin/Globulin Ratio   1.1  
Urine Color  Lt. yellow   (YELLOW)  
Urine Clarity  Clear   (CLEAR)  
Urine pH  7.0   (5.0-9.0)  
Ur Specific Gravity  1.010   (1.005-1.025)  
Urine Protein  Negative   (NEG/TRACE)  mg/dL
Urine Glucose (UA)  Negative   (NEGATIVE)  mg/dL
Urine Ketones  Negative   (NEGATIVE)  mg/dL
Urine Occult Blood  Small A   (NEGATIVE)  
Urine Nitrite  Negative   (NEGATIVE)  
Urine Bilirubin  Negative   (NEGATIVE)  
Urine Urobilinogen  0.2   (0.2-1.0)  EU/dL
Ur Leukocyte Esterase  Small A   (NEGATIVE)  
Urine RBC  2-5 A   (0-2)  #/HPF
Urine WBC  5-10 A   (NONE SEEN)  #/HPF
Ur Squamous Epith Cells  Few A   (NONE/RARE)  #/LPF
Urine Crystals  None seen   (None Seen)  #/HPF
Urine Bacteria  Small A   (NONE SEEN)  #/HPF
Urine Casts  None seen   (NONE SEEN)  #/LPF
Urine Mucus  None seen   (NONE SEEN)  
Ur Culture Indicated?  Yes   
Urine HCG, Qual  Negative   (NEGATIVE)  



Imaging Data
CT scan - abdomen: 
      Attestation: I have reviewed the pertinent imaging results.
      Radiologist's impression: 

ITS Impressions

Abdomen/Pelvis CT  03/02/24 17:44
IMPRESSION: 
 
No acute intra-abdominal or pelvic abnormality. No nephrolithiasis or 
obstructing stones.
 
 
Electronically authenticated by: VIVIANA SESAY   Date: 3/02/2024  20:05





Discharge Plan
Discharge
Chief Complaint: Abdominal Pain

Clinical Impression:
 UTI (urinary tract infection), Abdominal pain


Patient Disposition: Home, Self-Care

Time of Disposition Decision: 20:14

Condition: Good

Prescriptions / Home Meds:
New
  ketorolac 10 mg tablet 
   10 mg PO TID PRN (Reason: pain) Qty: 10 0RF
  ondansetron 4 mg tablet,disintegrating 
   4 mg PO Q6H PRN (Reason: nausea and vomiting) Qty: 12 0RF

Instructions:  Urinary Tract Infection in Women (ED), Pelvic Pain (ED)

Referrals:
Physician,Non-Staff, MD [Physician] - 1 week

Discharge Date/Time: 03/02/24 20:23

Stand Alone Forms:  Portal Instructions

## 2024-03-02 NOTE — ED_ITS
HPI - General Adult    
General    
Chief complaint: Abdominal Pain    
Stated complaint: LOWER BACK PAIN    
Time Seen by Provider: 03/02/24 17:36    
History of Present Illness    
HPI narrative:     
Patient is a 23-year-old female who presents to the emergency department for   
bilateral flank pain and pelvic pressure with urination that began yesterday.    
She was seen by her PCP office and her urine was negative, per the patient   
although she was prescribed Bactrim for an unknown reason.  She has had no   
fevers, chills.  She reports nausea with no vomiting.  She states she noticed   
blood in her urine today. She is not concerned for pregnancy.  No medications   
taken prior to arrival today for pain.    
Related Data    
                                  Previous Rx's    
    
    
    
 Medication  Instructions  Recorded    
     
ketorolac 10 mg tablet 10 mg PO TID PRN pain #10 tabs 03/02/24    
     
ondansetron 4 mg disintegrating 4 mg PO Q6H PRN nausea and 03/02/24    
    
tablet vomiting #12 tabs     
    
    
    
                                    Allergies    
    
    
    
Allergy/AdvReac Type Severity Reaction Status Date / Time    
     
No Known Drug Allergies Allergy   Verified 03/02/24 17:37    
    
    
    
    
Review of Systems    
    
    
ROS      
    
 Constitutional Denies: fever or chills       
    
 Ears, nose, mouth, and throat Denies: throat pain or nasal congestion       
    
 Cardiovascular Denies: chest pain       
    
 Respiratory Denies: shortness of breath or cough       
    
 Gastrointestinal Reports: nausea; Denies: vomiting or diarrhea       
    
 Genitourinary Reports: painful urination and pelvic pain       
    
 Musculoskeletal Reports: back pain; Denies: neck pain       
    
 Integumentary/Breast Denies: rash       
    
 Neurological Denies: headache       
    
 Hematologic/Lymphatic Denies: easy bruising or easy bleeding       
    
    
[facility]    
Social History (Reviewed 03/02/24 @ 18:00 by ISABEL Mackey)    
Smoking status:  Light tobacco smoker     
    
    
    
Exam    
Narrative    
Exam Narrative:     
Gen.: Awake, alert, in no distress    
Head: Normocephalic, atraumatic    
ENT: Moist mucous membranes    
Respiratory: No respiratory distress, lungs clear bilaterally    
Cardio: Regular rate and rhythm    
Gastrointestinal: Abdomen is soft, nondistended and nontender to palpation    
Back: No CVA tenderness    
Extremities: Moves extremities equally    
Psych: Normal mood and affect    
Neuro: No focal neuro deficit    
Skin: Warm, dry, intact    
    
Constitutional    
Vital Signs, click to edit/add:     
    
                                Last Vital Signs    
    
    
    
Temp  97.9 F   03/02/24 17:37    
     
Pulse  78   03/02/24 17:37    
     
Resp  18   03/02/24 17:37    
     
BP  124/76   03/02/24 17:37    
     
Pulse Ox  100   03/02/24 17:37    
     
O2 Del Method  Room Air  03/02/24 17:37    
    
    
    
    
    
Course    
Vital Signs    
Vital signs:     
    
                                   Vital Signs    
    
    
    
Temperature  97.9 F   03/02/24 17:37    
     
Pulse Rate  78   03/02/24 17:37    
     
Respiratory Rate  18   03/02/24 17:37    
     
Blood Pressure  124/76   03/02/24 17:37    
     
Pulse Oximetry  100   03/02/24 17:37    
     
Oxygen Delivery Method  Room Air  03/02/24 17:37    
    
    
                                            
    
    
    
Temperature  97.9 F   03/02/24 17:37    
     
Pulse Rate  78   03/02/24 17:37    
     
Respiratory Rate  18   03/02/24 17:37    
     
Blood Pressure  124/76   03/02/24 17:37    
     
Pulse Oximetry  100   03/02/24 17:37    
     
Oxygen Delivery Method  Room Air  03/02/24 17:37    
    
    
    
    
    
Medical Decision Making    
MDM Narrative    
Medical decision making narrative:     
Lab studies within normal limits, urine specimen with mild urinary tract   
infection.  Patient given IV fluids, Toradol, Zofran in the ER and discharged   
home to continue her Bactrim, she is given Toradol and Zofran as needed for   
comfort.  Follow-up with PCP.  CT scan with no evidence of acute abnormalities.   
Return to the ER if symptoms change or worsen    
Medical Records    
Medical records reviewed: Yes I reviewed the patient's medical records    
Lab Data    
Lab results reviewed: Yes I reviewed the patient's lab results    
Labs:     
    
                                   Lab Results    
    
    
    
  03/02/24 03/02/24 Range/Units    
    
  17:44 18:00     
     
WBC   6.0  (4.0-11.0)  10^3/uL    
     
RBC   4.06 L  (4.20-5.40)  10^6/uL    
     
Hgb   12.2  (12.0-16.0)  g/dL    
     
Hct   35.7 L  (36.0-48.0)  %    
     
MCV   87.9  (81.0-99.0)  fL    
     
MCH   30.0  (26.7-34.0)  pg    
     
MCHC   34.2  (29.9-35.2)  g/dL    
     
RDW   12.1  (11.0-15.0)  %    
     
Plt Count   205  (150-450)  10^3/uL    
     
MPV   9.8  (9.5-13.5)  fL    
     
Neut % (Auto)   58.4  (43.0-75.0)  %    
     
Lymph % (Auto)   31.1  (20.5-60.0)  %    
     
Mono % (Auto)   6.5  (1.7-12.0)  %    
     
Eos % (Auto)   2.7  (0.9-7.0)  %    
     
Baso % (Auto)   1.0  (0.2-2.0)  %    
     
Neut # (Auto)   3.5  (1.4-6.5)  10^3/uL    
     
Lymph # (Auto)   1.9  (1.2-3.8)  10^3/uL    
     
Mono # (Auto)   0.4  (0.3-0.8)  10^3/uL    
     
Eos # (Auto)   0.2  (0.0-0.7)  10^3/uL    
     
Baso # (Auto)   0.1  (0.0-0.1)  10^3/uL    
     
Abs Immat Gran (auto)   0.02  (0.00-0.03)  10^3/uL    
     
Imm/Tot Granulo (auto)   0.3  (0.0-0.5)  %    
     
Sodium   139  (136-145)  mmol/L    
     
Potassium   3.6  (3.5-5.1)  mmol/L    
     
Chloride   102  ()  mmol/L    
     
Carbon Dioxide   29.0  (21.0-32.0)  mmol/L    
     
Anion Gap   11.6      
     
BUN   12.0  (7.0-18.0)  mg/dL    
     
Creatinine   0.85  (0.55-1.02)  mg/dL    
     
Est GFR ( Amer)   >60  (>=60)      
     
Est GFR (Non-Af Amer)   >60  (>=60)      
     
BUN/Creatinine Ratio   14.1      
     
Glucose   72 L  ()  mg/dL    
     
Calcium   8.8  (8.5-10.1)  mg/dL    
     
Total Bilirubin   0.7  (0.2-1.0)  mg/dL    
     
AST   17  (15-37)  U/L    
     
ALT   20  (14-59)  U/L    
     
Alkaline Phosphatase   100  ()  U/L    
     
Total Protein   7.9  (6.4-8.2)  g/dL    
     
Albumin   4.1  (3.4-5.0)  g/dL    
     
Globulin   3.8  g/dL    
     
Albumin/Globulin Ratio   1.1      
     
Urine Color  Lt. yellow   (YELLOW)      
     
Urine Clarity  Clear   (CLEAR)      
     
Urine pH  7.0   (5.0-9.0)      
     
Ur Specific Gravity  1.010   (1.005-1.025)      
     
Urine Protein  Negative   (NEG/TRACE)  mg/dL    
     
Urine Glucose (UA)  Negative   (NEGATIVE)  mg/dL    
     
Urine Ketones  Negative   (NEGATIVE)  mg/dL    
     
Urine Occult Blood  Small A   (NEGATIVE)      
     
Urine Nitrite  Negative   (NEGATIVE)      
     
Urine Bilirubin  Negative   (NEGATIVE)      
     
Urine Urobilinogen  0.2   (0.2-1.0)  EU/dL    
     
Ur Leukocyte Esterase  Small A   (NEGATIVE)      
     
Urine RBC  2-5 A   (0-2)  #/HPF    
     
Urine WBC  5-10 A   (NONE SEEN)  #/HPF    
     
Ur Squamous Epith Cells  Few A   (NONE/RARE)  #/LPF    
     
Urine Crystals  None seen   (None Seen)  #/HPF    
     
Urine Bacteria  Small A   (NONE SEEN)  #/HPF    
     
Urine Casts  None seen   (NONE SEEN)  #/LPF    
     
Urine Mucus  None seen   (NONE SEEN)      
     
Ur Culture Indicated?  Yes       
     
Urine HCG, Qual  Negative   (NEGATIVE)      
    
    
    
    
Imaging Data    
CT scan - abdomen:     
      Attestation: I have reviewed the pertinent imaging results.    
      Radiologist's impression:     
    
ITS Impressions    
    
Abdomen/Pelvis CT  03/02/24 17:44    
IMPRESSION:     
     
No acute intra-abdominal or pelvic abnormality. No nephrolithiasis or     
obstructing stones.    
     
     
Electronically authenticated by: VIVIANA SESAY   Date: 3/02/2024  20:05    
    
    
    
    
    
Discharge Plan    
Discharge    
Chief Complaint: Abdominal Pain    
    
Clinical Impression:    
 UTI (urinary tract infection), Abdominal pain    
    
    
Patient Disposition: Home, Self-Care    
    
Time of Disposition Decision: 20:14    
    
Condition: Good    
    
Prescriptions / Home Meds:    
New    
  ketorolac 10 mg tablet     
   10 mg PO TID PRN (Reason: pain) Qty: 10 0RF    
  ondansetron 4 mg tablet,disintegrating     
   4 mg PO Q6H PRN (Reason: nausea and vomiting) Qty: 12 0RF    
    
Instructions:  Urinary Tract Infection in Women (ED), Pelvic Pain (ED)    
    
Referrals:    
Physician,Non-Staff, MD [Physician] - 1 week    
    
Discharge Date/Time: 03/02/24 20:23    
    
Stand Alone Forms:  Portal Instructions

## 2024-03-02 NOTE — CT_ITS
The [address] [address] 
     [phone] 
  
  
Patient Name: 
IDRIS CLAYTON 
  
MRN: [id number]    YOB: 2000    Sex: F 
Assigned Patient Location: ED.MAIN 
Current Patient Location:  
Accession/Order Number: [id number] 
Exam Date: 3/02/2024  18:45    Report Date: 3/02/2024  20:05 
  
At the request of: 
CHRISTINA DAVISON   
  
Procedure:  CT abdomen pelvis wo con 
  
EXAM: CT abdomen pelvis wo con  
  
HISTORY: Kidney stone 
  
COMPARISON: CT abdomen pelvis 8/13/2022  
  
TECHNIQUE: Unenhanced axial CT of the abdomen and pelvis was performed with  
coronal and sagittal reformats provided. 
  
FINDINGS:  
  
Lung bases: Clear. 
  
ABDOMEN: 
  
Liver: Normal. 
  
Gallbladder/biliary: Status post cholecystectomy. 
  
Pancreas: Normal. 
  
Spleen: Normal. 
  
Adrenals: Normal. 
  
Kidneys, ureters and urinary bladder: Normal. 
  
Pelvis: Retroverted uterus. No adnexal masses. 
  
Vasculature: Normal caliber of the abdominal aorta. 
  
Hollow viscera/retroperitoneum: Normal appearance of the gastroesophageal  
junction. Small bowel is normal caliber. Appendix is normal. No focal colonic  
wall thickening. 
  
No mesenteric or pelvic lymphadenopathy. Trace free fluid in the pelvis. No  
intra-abdominal free air. 
  
Musculoskeletal/soft tissues: Periumbilical fat-containing hernia with a  
fascial defect measuring 8 mm. No acute or aggressive osseous abnormalities.  
Prominent Schmorl's node at the inferior endplate of L3. 
  
ORDER #: [id number] CT/CT abdomen pelvis wo con  
IMPRESSION:   
   
No acute intra-abdominal or pelvic abnormality. No nephrolithiasis or   
obstructing stones.  
   
   
Electronically authenticated by: VIVIANA SESAY   Date: 3/02/2024  20:05

## 2024-08-28 ENCOUNTER — HOSPITAL ENCOUNTER
Age: 24
Discharge: HOME | End: 2024-08-28
Payer: COMMERCIAL

## 2024-08-28 DIAGNOSIS — R10.9: Primary | ICD-10-CM

## 2024-08-28 DIAGNOSIS — R53.83: ICD-10-CM

## 2024-08-28 LAB
ADD MANUAL DIFF: NO
ALANINE AMINOTRANSFERASE: 12 U/L (ref 14–59)
ALBUMIN GLOBULIN RATIO: 1.1
ALBUMIN LEVEL: 3.9 G/DL (ref 3.4–5)
ALKALINE PHOSPHATASE: 75 U/L (ref 46–116)
ANION GAP: 11.9
ASPARTATE AMINO TRANSFERASE: 11 U/L (ref 15–37)
BLOOD UREA NITROGEN: 6 MG/DL (ref 7–18)
CALCIUM: 8.6 MG/DL (ref 8.5–10.1)
CARBON DIOXIDE: 27.8 MMOL/L (ref 21–32)
CHLORIDE: 103 MMOL/L (ref 98–107)
CO2 BLD-SCNC: 27.8 MMOL/L (ref 21–32)
ESTIMATED GFR (AFRICAN AMERICA: >60 (ref 60–?)
ESTIMATED GFR (NON-AFRICAN AME: >60 (ref 60–?)
GLOBULIN: 3.4 G/DL
GLUCOSE BLD-MCNC: 88 MG/DL (ref 74–106)
HCT VFR BLD CALC: 35 % (ref 36–48)
HEMATOCRIT: 35 % (ref 36–48)
HEMOGLOBIN: 12.3 G/DL (ref 12–16)
IMMATURE GRANULOCYTES ABS AUTO: 0 10^3/UL (ref 0–0.03)
IMMATURE GRANULOCYTES PCT AUTO: 0 % (ref 0–0.5)
LIPASE: 20 U/L (ref 16–77)
LYMPHOCYTES  ABSOLUTE AUTO: 1.3 10^3/UL (ref 1.2–3.8)
MCV RBC: 89.5 FL (ref 81–99)
MEAN CORPUSCULAR HEMOGLOBIN: 31.5 PG (ref 26.7–34)
MEAN CORPUSCULAR HGB CONC: 35.1 G/DL (ref 29.9–35.2)
MEAN CORPUSCULAR VOLUME: 89.5 FL (ref 81–99)
PLATELET # BLD: 232 10^3/UL (ref 150–450)
PLATELET COUNT: 232 10^3/UL (ref 150–450)
POTASSIUM SERPLBLD-SCNC: 3.7 MMOL/L (ref 3.5–5.1)
POTASSIUM: 3.7 MMOL/L (ref 3.5–5.1)
RED BLOOD COUNT: 3.91 10^6/UL (ref 4.2–5.4)
SODIUM BLD-SCNC: 139 MMOL/L (ref 136–145)
SODIUM: 139 MMOL/L (ref 136–145)
TOTAL PROTEIN: 7.3 G/DL (ref 6.4–8.2)
TSH W/ REFLEX FT4: 1.35 UIU/ML (ref 0.36–3.74)
WBC # BLD: 4 10^3/UL (ref 4–11)
WHITE BLOOD COUNT: 4 10^3/UL (ref 4–11)

## 2024-08-28 PROCEDURE — 84443 ASSAY THYROID STIM HORMONE: CPT

## 2024-08-28 PROCEDURE — 36415 COLL VENOUS BLD VENIPUNCTURE: CPT

## 2024-08-28 PROCEDURE — 85025 COMPLETE CBC W/AUTO DIFF WBC: CPT

## 2024-08-28 PROCEDURE — 83690 ASSAY OF LIPASE: CPT

## 2024-08-28 PROCEDURE — 80053 COMPREHEN METABOLIC PANEL: CPT

## 2024-09-20 NOTE — TELEPHONE ENCOUNTER
Stable weight . Type 2 diabetes without obesity associated with cataracts and hyperlipidemia with improving control and within goal.  A1c 6.9%.   BMI 21.46. .  Lipids within goal.  Normal urine microalbumin.  I recommended that he eat healthy and go for daily walk.  I also recommended return appointment in 4 months and recheck fasting labs at that time.        He had a eye exam with cataracts and without retinopathy on 9/20/2024 by Dr Sheldon Cordero.      Patient notified of results and voiced understanding. Patient would like to be seen in office, scheduled for appointment 03/17/2021.

## 2024-09-25 ENCOUNTER — HOSPITAL ENCOUNTER
Dept: HOSPITAL 101 - CT | Age: 24
Discharge: HOME | End: 2024-09-25
Payer: COMMERCIAL

## 2024-09-25 DIAGNOSIS — N83.291: ICD-10-CM

## 2024-09-25 DIAGNOSIS — R10.9: Primary | ICD-10-CM

## 2024-09-25 DIAGNOSIS — R53.83: ICD-10-CM

## 2024-09-25 PROCEDURE — 74177 CT ABD & PELVIS W/CONTRAST: CPT

## 2024-09-25 NOTE — CT_ITS
The [address] [address] 
     [phone] 
  
  
Patient Name: 
IDRIS CLAYTON 
  
MRN: [id number]    YOB: 2000    Sex: F 
Assigned Patient Location: CT 
Current Patient Location: CT 
Accession/Order Number: [id number] 
Exam Date: 9/25/2024  09:20    Report Date: 9/25/2024  10:10 
  
At the request of: 
BJ MENJIVAR   
  
Procedure:  CT abdomen pelvis w con 
  
EXAM: CT abdomen pelvis w con  
  
HISTORY: Abdominal Pain, Fatigue 
  
COMPARISON: CT abdomen pelvis 3/2/2024..  
  
TECHNIQUE: Following the intravenous administration of 100 mL of Omnipaque  
350,  
axial soft tissue windows of the abdomen and pelvis were performed with  
coronal  
and sagittal reformats. CT dose reduction technique was used including  
Automated Exposure Control. 
  
 Findings: 
  
ABDOMEN: 
  
The liver, spleen, pancreas, and adrenal glands are unremarkable. 
  
The gallbladder is surgically absent. 
  
Minimal left and mild right renal collecting system dilatation. No renal  
stones. The right ureter is mildly dilated. No ureteral stone. 
  
The bowel is unremarkable without evidence of wall thickening or reduction.  
The  
appendix is nondilated. 
  
The aorta is normal caliber. 
  
No enlarged abdominal lymph nodes or free abdominal fluid. 
  
Small fat-containing umbilicus hernia. 
  
Pelvis: 
  
Unremarkable bladder. 
  
The uterus is present and retroverted. There is irregularly-shaped  
rim-enhancing low-attenuation lesion within the right adnexa likely relating  
to  
an involuting ovarian cyst. This measures approximately 2.0 cm. 
  
Small amount of free fluid within the pelvis. 
  
No enlarged pelvic lymph nodes. 
  
No aggressive sclerotic or lytic osseous lesions. 
  
ORDER #: [id number] CT/CT abdomen pelvis w con  
IMPRESSION:  
1. Probable involuting right ovarian cyst with a small amount of free fluid   
within the pelvis.  
   
2. Minimal left and mild right renal collecting system dilatation. Findings   
may   
be physiologic, relate to obstruction or possibly reflux.  
   
   
Electronically authenticated by: AMARA FLORES   Date: 9/25/2024  10:10

## 2024-09-25 NOTE — XMS_ITS
Comprehensive CCD (C-CDA v2.1)  
  
                         Created on: 2024  
  
  
Shameka Figueredo  
External Reference #: [id number],PersonID:[id number]  
: 2000  
Sex: Female  
  
Demographics  
  
  
                                        Address             [address] [place] [address]  [address]  
   
                                        Mobile Phone        [phone]  
   
                                        Preferred Language  en  
   
                                        Marital Status        
   
                                        Anglican Affiliation Unknown  
   
                                        Race                White  
   
                                        Ethnic Group        Not  or Lati  
no  
  
  
Author  
  
  
                                        Organization        [facility]  
  
  
Care Team Providers  
  
  
                                Care Team Member Name Role            Phone  
   
                                Hyun, Sarah L. Unavailable     Unavailable  
   
                                Hyun, Sarah L. Unavailable     Unavailable  
   
                                Hyun, Sarah L. Unavailable     Unavailable  
   
                                Hyun, Sarah LDeon Unavailable     Unavailable  
   
                                Hyun, Sarah LDeon Unavailable     Unavailable  
   
                                Unavailable     Primary Care Provider UnavailLayla Simpson Primary Care Provider Unava  
ilable  
   
                                Layla Renteria Primary Care Provider [phone]  
   
                                BJ MENJIVAR   Primary Care    Unavailable  
   
                                PRANAV LARSEN Referring       UnavailPRANAV Minor Referring       UnavailLAYLA Simpson Primary Care    Unavailable  
   
                                PRANAV LARSEN Referring       UnavailLAYLA Simpson Primary Care    Unavailable  
   
                                Layla Renteria MD Primary Care Provide  
r [phone]  
   
                                Saul Rodriguez     Unavailable     [phone]  
   
                                Bj Menjivar MD Primary Care Provider [phone]   
   
                                MD Bj Menjivar Primary Care Provider [phone]   
   
                                DO Ro Duffy Attending Provider [phone]  
   
                                DO Eros Sandoval Jr Attending Provider [phone]  
   
                                Najma Adams   Unavailable     [phone]  
   
                                DO Thai Cano Emergency Provider [phone]  
   
                                DO Dwain Guzman Emergency Provider [phone]  
   
                                Bj Menjivar Unavailable     [phone]  
   
                                Saul Rodriguez     Unavailable     [phone]  
   
                                Bj Menjivar MD Primary Care Provider [phone]   
   
                                ESTELLE MACKAY Attending       Unavailable  
   
                                BJ MENJIVAR Referring       Unavailable  
   
                                BJ MENJIVAR Primary Care    Unavailable  
   
                                LAYLA RENTERIA Primary Care    Unavail  
able  
   
                                ESTELLE MACKAY Referring       Unavailable  
   
                                JUYASH, [facility] Primary Care    Unavail  
able  
   
                                ESTELLE MACKAY Attending       Unavailable  
   
                                BJ MENJIVAR Primary Care    Unavailable  
   
                                Otto, Dr. Bj Alberts Primary Care    Unav  
ailable  
   
                                CASA, Dr. JOSIANE WRIGHT Attending       Unavailable  
   
                                Otto, Dr. Bj Alberts Referring       Unav  
Bj Mahoney MD Primary Care Provider [phone]   
   
                                ESTELLE MACKAY Referring       Unavailable  
   
                                JUWARKAR, [facility] Primary Care    Unavail  
able  
   
                                NATAPRAWIKALEB, RO Referring       Unavailable  
   
                                BJ MENJIVAR Primary Care    Unavailable  
   
                                UMM RICO    Attending       Unavailable  
   
                                PROVIDER, OB GYN TRANSCRIBE Referring       Unav  
ailable  
   
                                BJ MENJIVAR Primary Care    Unavailable  
   
                                JACKY UMM    Attending       Unavailable  
   
                                BJ MENJIVAR Primary Care    Unavailable  
   
                                SUBHAS, LACHO  Admitting       Unavailable  
   
                                SUBHAS, LACHO  Attending       Unavailable  
   
                                DEXTER, [facility] Primary Care    Unavail  
able  
   
                                SUBHAS, LACHO  Admitting       Unavailable  
   
                                SUBHAS, LACHO  Attending       Unavailable  
   
                                BJ MENJIVAR Primary Care    Unavailable  
   
                                ELENA POWELL Attending       Unavailable  
   
                                JUYASH, [facility] Primary Care    Unavail  
able  
   
                                MD Bj Menjivar Primary Care Provider [phone]   
   
                                DO Ro Duffy Referring Provider [phone]  
   
                                DO Juan Francisco Stoll Attending Provider [phone]  
[phone]  
   
                                NatDO Ro sky Attending Provider [phone]  
[phone]  
   
                                DOV ., MIRIAM Admitting       Unavailable  
   
                                DOV Chavarria, MIRIAM Attending       Unavailable  
   
                                DOV Chavarria, MIRIAM Consulting      Unavailable  
   
                                OTTO, DR BJ GRECO Primary Care    Unavailable  
   
                                IOANA ., DR WEBBER Attending       Unavailmarco antonio  
e  
   
                                KARSHANNONK ., DR WEBBER Consulting      Unavailmarco antonio MENJIVAR, DR BJ GRECO Primary Care    Unavailable  
   
                                KARWANDA ., DR WEBBER Admitting       UnavailMATT Delaney Admitting       Unavailable  
   
                                MATT MOREL Attending       Unavailable  
   
                                MATT MOREL Consulting      Unavailable  
   
                                OTTO, DR BJ GRECO Primary Care    Unavailable  
   
                                VIVI, DR ALEX BACA Admitting       Unavailable  
   
                                VIVI, DR ALEX BACA Attending       Unavailable  
   
                                VIVI, DR ALEX BACA Consulting      Unavailable  
   
                                OTTO, DR BJ GRECO Primary Care    Unavailable  
   
                                DEYANIRA ROMO Consulting      Unavailable  
   
                                DARIELA DOSS Consulting      Unavailable  
   
                                MD Bj Menjivar Primary Care Provider [phone]  
  
   
                                Tati DO Ro Referring Provider [phone]  
   
                                DO Juan Francisco Stoll Attending Provider [phone]  
[phone]  
   
                                DO Ro Duffy Attending Provider [phone]  
   
                                MD Iglesia Ayala Attending Provider [phone]  
   
                                MD Evelin Sethi Admit Provider  [phone]  
   
                                MD Evelin Sethi Attending Provider [phone]  
   
                                MD Shanika Gordon Attending Provider [phone]  
   
                                Shanika Gordon   Admitting       Unavailable  
   
                                Shanika Gordon   Attending       Unavailable  
   
                                Bj Menjivar Primary Care    Unavailable  
   
                                Bj Menjivar Primary Care    Unavailable  
   
                                Evelin Sethi Admitting       Unavailable  
   
                                Evelin Sethi Attending       Unavailable  
   
                                Juan Francisco Stoll  Attending       Unavailable  
   
                                Bj Menjivar Primary Care    Unavailable  
   
                                Juan Francisco Stoll  Admitting       Unavailable  
   
                                Tati, Ro Referring       Unavailable  
   
                                Bj Menjivar Primary Care    Unavailable  
   
                                Natjose daniel, Ro Admitting       Unavailable  
   
                                Ro Duffy Attending       Unavailable  
   
                                Bj Menjivar Primary Care    Unavailable  
   
                                Iglesia Ayala   Admitting       Unavailable  
   
                                Iglesia Ayala   Attending       Unavailable  
   
                                Natleeannera, Ro Admitting       Unavailable  
   
                                Natjose daniel, Ro Attending       Unavailable  
  
  
  
Allergies  
  
  
                                                    Allergy   
Classification                          Reported   
Allergen(s)               Allergy Type              Date of   
Onset                     Reaction(s)               Facility  
   
                                                      
(20 sources)                            Acetaminophen /   
HYDROcodone;   
Translations:   
[HYDROCODONE-ACETA  
MINOPHEN]                 Drug Allergy                
6                                       Mental Status   
Change                                  [facility] Phone:   
[phone]  
  
  
  
Medications  
Current Medications  
  
  
  
                      Medication Drug Class(es) Dates      Sig (Normalized) Sig   
(Original)  
   
                                                    betamethasone 0.5   
mg/ml / clotrimazole   
10 mg/ml topical   
cream  
(3 sources)                             Azole Antifungal,   
Corticosteroid                          Start:   
2021                                          clotrimazole-betame  
thasone (LOTRISONE)   
1-0.05 % cream   
Indications: Vulvar   
irritation Apply   
topically 2 times   
daily. 1 Tube 1   
2021 Active  
   
                                                    cholecalciferol 0.01   
mg oral capsule  
(1 source)                Vitamin D                 Start:   
2022  
End:   
10-                              take 1 capsule   
by mouth once   
daily at   
mealtime                                cholecalciferol 400   
intl units (10 mcg)   
oral capsule ; 1   
cap(s) orally once   
a day Quantity: 30   
Refills: 0 Ordered:   
17-Sep-2022   
Reyna Evans   
Start: 17-Sep-2022   
End: 16-Oct-2022   
Generic   
Substitution   
Allowed Comments:   
Take with food.  
   
                                        Comment on above:   Take with food.   
   
                                                    dicyclomine   
hydrochloride 10 mg   
oral capsule  
(2 sources)               Anticholinergic           Start:   
10-  
End:   
10-                                          dicyclomine   
(BENTYL) capsule 10   
mg  
   
                                                    drospirenone 3 mg /   
ethinyl estradiol   
0.03 mg oral tablet  
(3 sources)               Progestin, Estrogen       Start:   
2020                              take 1 tablet by   
mouth once daily                        drospirenone-ethiny  
l estradiol (SANG   
28) 3-0.03 MG TABS   
Indications:   
Irregular menses   
Take 1 tablet by   
mouth daily 1   
packet 3 2020   
Active  
   
                                                    DULoxetine 30 mg   
delayed release oral   
capsule  
(13 sources)                            Serotonin and   
Norepinephrine   
Reuptake Inhibitor                      Start:   
2022                              take 1 capsule   
by mouth every   
twenty-four   
hours                                   DULoxetine HCl 30   
MG 1 capsule Orally   
Once a day for 30   
days    
Active  
  
  
  
                                                take 1 capsule by mouth once duke  
ly DULoxetine (CYMBALTA) 60 mg capsule Take 60   
mg by mouth once daily. 0 Active  
   
                                                            take 1 capsule by mo  
uth every twelve   
hours                                   DULoxetine HCl 60 MG 1 capsule Orally Tw  
ice   
a day for 30 days Active  
  
  
  
                                        Comment on above:   Take 60 mg by mouth   
once daily.   
   
                                                    ethinyl estradiol   
0.035 mg /   
norgestimate 0.25 mg   
oral tablet  
(2 sources)               Progestin, Estrogen       Start:   
20  
19                                      take 1 tablet   
by mouth once   
daily                                   GRIFFIN 0.25-35 MG-MCG   
per tablet   
Indications:   
Postpartum care and   
examination TAKE 1   
TABLET BY MOUTH DAILY   
84 tablet 3   
2019 Active  
   
                                                    fluconazole 150 mg   
oral tablet  
(2 sources)               Azole Antifungal          Start:   
20  
21                                                  fluconazole   
(DIFLUCAN) 150 MG   
tablet Indications:   
Yeast vaginitis Take   
1 tablet daily for 3   
days. 3 tablet 0   
2021 Active  
   
                                                    gabapentin 300 mg oral   
capsule  
(1 source)          Anti-epileptic Agent                     take 1 capsule   
by mouth every   
eight hours                             Gabapentin 300 MG 1   
capsule Orally three   
times a day Active  
   
                                                    levonorgestrel   
0.693452 mg/hr   
intrauterine system  
(5 sources)                             Progestin,   
Progestin-containing   
Intrauterine Device                     Start:   
20  
20                                                  levonorgestrel   
(MIRENA) IUD 52 mg 1   
each  
   
                                                    lidocaine 0.05 mg/mg   
medicated patch  
(18 sources)                            Antiarrhythmic,   
Amide Local   
Anesthetic                              Start:   
20                                                  Lidocaine Active 1   
PATCH TOPICAL Daily   
2024   
12:00am [employer] patch remove after   
12 hours Externally   
Once a day; Note:   
Source Status:   
Not-Taking\PRN;   
Provider: Jennifer Hughes   
(NPI: [id number])  
  
  
  
                                Start: 2020                 Lidocaine 18 J  
2020 1 mL  
   
                                                                Lidocaine 5 % 1   
patch remove after 12 hours Externally Once a day   
Not-Taking  
  
  
  
                                                    LORazepam 0.5 mg   
oral tablet  
(16 sources)        Benzodiazepine      Start: 2021   take 1 tablet by   
mouth every   
twenty-four hours                       Ativan 0.5 MG   
1 tablet as   
needed Orally   
Once a day for   
4 days f41.0   
   
Active  
  
  
  
                                Start: 2020                 LORazepam (ATI  
VAN) 0.5 MG tablet  
  
  
  
                                                    OLANZapine 5 mg   
oral tablet  
(7 sources)         Atypical Antipsychotic                     take 2.5 mg by   
mouth once daily                        OLANZapine (ZYPREXA)   
5 MG tablet Take 2.5   
mg by mouth nightly   
0 Active  
   
                                                    Prenatal Complete   
with DHA oral   
capsule  
(1 source)                                          Start:   
20  
End:   
10-16-20  
22                                      take 3 capsules   
by mouth once   
daily                                   Prenatal Complete   
with DHA oral   
capsule ; 3 cap(s)   
orally once a day   
Quantity: 90   
Refills: 0 Ordered:   
17-Sep-2022 Reyna Evans Start:   
17-Sep-2022 End:   
16-Oct-2022 Generic   
Substitution Allowed  
   
                                                    Prenatal Vit-Fe   
Fumarate-FA   
(PRENATAL VITAMIN   
PO)  
(2 sources)                                                     Prenatal Vit-Fe   
Fumarate-FA   
(PRENATAL VITAMIN   
PO) Take by mouth 0   
Active  
  
  
  
Completed/Discontinued Medications  
  
  
  
                      Medication Drug Class(es) Dates      Sig (Normalized) Sig   
(Original)  
   
                                                    acetaminophen 325 mg   
oral capsule  
(5 sources)                                         Start:   
2023  
End:   
2024                                          Acetaminophen   
(Tylenol) 325 mg   
Capsule   
Discontinued 1000   
MG PO Every 6 hours   
2023   
1:00am 2024 3:31pm  
   
                                                    acetaminophen 325 mg   
/ HYDROcodone   
bitartrate 5 mg oral   
tablet  
(13 sources)              Opioid Agonist            Start:   
2021  
End:   
2022                              take 1 tablet by   
mouth every eight   
hours                                   Hydrocodone-Acetami  
nophen Discontinued   
1 TAB PO Q8H 10 3   
September 5th, 2021   
January 20th, 2022   
7:20pm  
   
                                                    acetaminophen 325 mg   
/ oxyCODONE   
hydrochloride 5 mg   
oral tablet  
(10 sources)              Opioid Agonist            Start:   
2024  
End:   
2024                              take 1 tablet by   
mouth every six   
hours as needed                         Oxycodone-Acetamino  
phen Discontinued 1   
TAB PO Every 6   
hours 2024 12:00am 2024 1:48pm   
FreeText[employer]   
tablet as needed   
Orally every 6 hrs;   
Note: Source   
Status:   
Not-Taking\PRNfor 5   
days; Provider:   
Jennifer Hughes (NPI:   
[id number])  
  
  
  
                                        Start: 2022   take 1 tablet by violet  
th every six   
hours                                   Percocet 5-325 MG 1 tablet as   
needed Orally every 6 hrs for 5   
days  Not-Taking  
  
  
  
                                                    ARIPiprazole 5 mg   
oral tablet  
(20 sources)                            Atypical   
Antipsychotic                           Start:   
2024  
End: 2024                         take 1   
tablet by   
mouth once   
daily                                   Aripiprazole   
Discontinued 1 TAB   
PO Daily 2024 12:00am   
2024   
1:49pm   
[employer]   
tablet Orally Once   
a day; Note:   
Source Status:   
Takingqhs;   
Provider: Jennifer Hughes (NPI:   
[id number])  
  
  
  
                                                    Start: 2022  
End: 10-                         take 1 tablet by mouth once   
daily                                   ARIPiprazole 5 mg oral tablet ; 1   
tab(s) orally once a day Quantity:   
30 Refills: 0 Ordered: 19-Sep-2022   
Curry Guerrero Start:   
19-Sep-2022 End: 18-Oct-2022 Generic   
Substitution Allowed  
   
                                        Start: 01-   take 2.5 mg by mouth  
 every   
twenty-four hours                       ARIPiprazole (ABILIFY) 2 mg tablet   
Take 2.5 mg by mouth q 24 HR. 0   
01/10/2022 Active  
   
                                        Start: 01-   take 1 tablet by violet  
th every   
twenty-four hours                       ARIPiprazole 2 MG 1 tablet Orally   
Once a day for 30 day(s) 10 Neo,   
2022 Active  
   
                                        Start: 2020   take 1 tablet by violet  
th once   
daily                                   ARIPiprazole (ABILIFY) 15 mg tablet   
Take 1 tablet by mouth once daily. 0   
2020 Active  
   
                                Start: 2020                 ARIPiprazole (  
ABILIFY) 15 MG tablet   
5 mg 0 2020 Active  
   
                                Start: 2020                 ARIPiprazole (  
ABILIFY) 15 MG tablet  
   
                                                    Start: 2019  
End: 2022           take 2 mg by mouth once daily Aripiprazole Discontinue  
d 2 MG PO   
Daily 2019 1:00am   
2022 10:12am  
  
  
  
                                        Comment on above:   Take 1 tablet by violet  
 once daily.   
   
                                                            Take 2.5 mg by mouth  
 q 24 HR.   
   
                                                    cefdinir 300 mg oral   
capsule  
(2 sources)                             Cephalosporin   
Antibacterial                           Start:   
  
4  
End:   
  
4                                       take 300 mg by   
mouth twice   
daily                                   Cefdinir   
Discontinued 300 MG   
PO Twice daily    
12:00am 2024 1:48pm  
   
                                                    cefTRIAXone  
(8 sources)                             Cephalosporin   
Antibacterial                           Start:   
  
0                                                   Rocephin 500 mg  500 mg  
   
                                                    cephalexin 500 mg   
oral capsule  
(20 sources)                            Cephalosporin   
Antibacterial                           Start:   
  
2  
End:   
  
3                                       take 500 mg by   
mouth twice   
daily                                   Cephalexin   
Discontinued 500 MG   
PO Twice daily 10 5   
September 14th, 2022   
12:00am 2023 4:34pm  
  
  
  
                                                    Start: 2022  
End: 2022                         take 500 mg by mouth every   
eight hours                             Cephalexin Discontinued 500 MG PO   
Q8H 21 7 2022 1:00am   
2022 10:12am  
   
                                        Start: 2021   take 1 capsule by mo  
uth three   
times daily                             cephALEXin (KEFLEX) 500 MG capsule   
Indications: Abscess, [place]'s gland   
Take 1 capsule by mouth 3 times   
daily 21 capsule 0 2021 Active  
  
  
  
                                                    citalopram 20 mg   
oral tablet  
(15 sources)                            Serotonin   
Reuptake   
Inhibitor                               Start: 2022  
End: 2022                         take 20 mg by   
mouth once   
daily                                   Citalopram   
Discontinued 20 MG   
PO Daily 2022 1:00am   
2022 10:12am  
  
  
  
                                                      
End: 02-                         take 1 tablet by mouth every   
twenty-four hours                       CeleXA 10 MG 1 tablet Orally   
Once a day for 30 days 15 Feb,   
2022 Active  
  
  
  
                                                    cranberry   
preparation 400 mg   
oral capsule  
(12 sources)                            Non-Standardized Food   
Allergenic Extract,   
Non-Standardized Plant   
Allergenic Extract                      Start:   
2020                              take 1   
capsule by   
mouth once   
daily                                   Cranberry 400 mg   
cap Take 1   
capsule by mouth   
once daily. 0   
2020   
Suspended  
  
  
  
                                        Start: 2020   take 1 capsule by mo  
uth once   
daily                                   Cranberry 400 mg cap Take 1 capsule   
by mouth once daily. 0 2020   
Active  
  
  
  
                                        Comment on above:   Take 1 capsule by mo  
uth once daily.   
   
                                                    docusate sodium 100   
mg oral capsule  
(3 sources)                                         Start:   
  
End:   
                                     take 1 capsule by   
mouth twice daily                       Docusate Sodium   
(Colace) 100 mg   
capsule   
Discontinued 100 MG   
PO Twice daily 30   
May 1st, 2023   
8:49am 2024 3:31pm  
   
                                                    hydrOXYzine   
hydrochloride 25 mg   
oral tablet  
(18 sources)              Antihistamine             Start:   
09-15-2  
021                                     take 1 tablet by   
mouth every eight   
hours                                   hydrOXYzine HCl 25   
MG 1 tablet as   
needed Orally every   
8 hrs for 30 day(s)   
15 Sep, 2021   
Not-Taking  
   
                                                    ibuprofen 600 mg oral   
tablet  
(3 sources)                             Nonsteroidal   
Anti-inflammatory   
Drug                                    Start:   
  
End:   
                                     take 600 mg by   
mouth every six   
hours                                   Ibuprofen   
Discontinued 600 MG   
PO Q6H 30 May 1st,   
2023 12:00am 2024 3:31pm  
   
                                                    24 hr metFORMIN   
hydrochloride 750 mg   
extended release oral   
tablet  
(15 sources)              Biguanide                 Start:   
                                     take 1 tablet by   
mouth once daily   
at breakfast                            metFORMIN ER   
(GLUCOPHAGE XR) 750   
mg 24 hr tablet   
Take 1 tablet by   
mouth daily with   
breakfast. 90   
tablet 3 2020   
Active  
   
                                        Comment on above:   Take 1 tablet by [facility] daily with breakfast.   
   
                                                    24 hr nicotine 0.875   
mg/hr transdermal   
system  
(9 sources)                             Cholinergic   
Nicotinic Agonist                       Start:   
  
End:   
                                     apply 1 dose   
transdermal route   
once daily                              Nicotine   
Discontinued 1   
PATCH TRANSDERML   
Daily 2024 12:00am 2024 1:48pm   
[employer]   
patch to skin   
Transdermal Once a   
day; Note: Source   
Status:   
Not-Taking\PRN;   
Refills: 0;   
Provider: Jennifer Hughes  
  
  
  
                                                                Nicotine Step 1   
21 MG/24HR 1 patch to skin Transdermal Once a day for 30   
day(s)   
Not-Taking  
  
  
  
                                                    ondansetron 4 mg   
disintegrating oral   
tablet  
(9 sources)                             Serotonin-3   
Receptor   
Antagonist                              Start: 2022  
End: 2023                         take 4 mg   
by mouth   
every eight   
hours                                   Ondansetron   
Discontinued 4 MG   
PO Q8H 10 2   
2022 12:00am   
2023 4:34pm  
  
  
  
                                                    Start: 10-  
End: 10-                                     ondansetron (ZOFRAN) injecti  
on 4 mg  
   
                                        Start: 10-   take 1 tablet by violet  
th every   
eight hours as needed for   
nausea                                  ondansetron (ZOFRAN ODT) 4 MG   
disintegrating tablet Take 1 tablet   
by mouth every 8 hours as needed for   
Nausea 20 tablet 0 10/15/2020 Active  
  
  
  
                                                    Pnv #30-Iron-Folic   
Acid-Omega3  
(1 source)                                          Start: 2024  
End: 2024                                     Pnv #30-Iron-Folic   
Acid-Omega3 Discontinued   
CAP PO 2024   
12:00am 2024 1:49pm  
   
                                                    Pnv Cmb#95-Ferrous   
Fumarate-Fa (Prenatal) 28   
mg iron- 800 mcg Tablet  
(6 sources)                                         Start: 2023  
End: 2024                         take 1 tablet by   
mouth once daily                        Pnv Cmb#95-Ferrous   
Fumarate-Fa (Prenatal)   
28 mg iron- 800 mcg   
Tablet Discontinued 1   
TAB PO Daily 2023 1:00am 2024 3:31pm  
  
  
  
                                        Start: 2023   take 1 tablet by violet  
th once   
daily                                   Pnv Cmb#95-Ferrous Fumarate-Fa   
(Prenatal) 28 mg iron- 800 mcg Tablet   
Active 1 TAB PO Daily 2023 1:00am  
   
                                        Start: 2023   take 1 tablet by violet  
th once   
daily                                   Pnv Cmb#95-Ferrous Fumarate-Fa   
(Prenatal) 28 mg iron- 800 mcg Tablet   
Active 1 TAB PO Daily 2023 12:00am  
   
                                Start: 2023                 Pnv Cmb#95-Simon  
yanna Fumarate-Fa   
(Prenatal) 28 mg iron- 800 mcg Tablet   
Active TAB PO 2023   
12:00am  
  
  
  
                                                    predniSONE 10 mg   
oral tablet  
(13 sources)                                        Start: 2021  
End: 2022                         take 60 mg by   
mouth once   
daily, then   
take 40 mg by   
mouth once   
daily, then   
take 20 mg by   
mouth once   
daily, then   
take 10 mg by   
mouth once   
daily                                   Prednisone   
Discontinued 10 MG   
PO Daily 39   
2021   
12:00am 2022 7:20pm   
60mg daily for   
three days, 40mg   
daily for three   
days, 20mg daily   
for three days,   
10mg daily for   
three days.  
   
                                                    sertraline 50 mg   
oral tablet  
(20 sources)                            Serotonin   
Reuptake   
Inhibitor           Start: 11-                       sertraline (ZOLOFT)   
50 mg tablet 50 mg.   
0 11/10/2022 Active  
  
  
  
                                        Start: 2022   take 1 tablet by violet  
th once   
daily                                   sertraline 50 mg oral tablet ; 1   
tab(s) orally once a day Quantity: 0   
Refills: 0 Ordered: 19-Sep-2022   
Curry Guerrero Start:   
19-Sep-2022 Generic Substitution   
Allowed  
   
                                                    Start: 2022  
End: 2024           take 75 mg by mouth once daily Sertraline Discontinued  
 75 MG PO   
Daily 2022 12:00am   
2024 3:31pm  
   
                                Start: 2022 take 25 mg by mouth once daily  
 Sertraline Active 25 MG PO   
Daily   
2022 12:00am  
   
                                        Start: 2020   take 1.5 tablets by   
mouth once   
daily                                   sertraline (ZOLOFT) 100 mg tablet   
Take 1.5 tablets by mouth once   
daily. 0 2020 Active  
   
                                                    Start: 2019  
End: 2022           take 200 mg by mouth once daily Sertraline Discontinue  
d 200 MG   
PO   
Daily 2019 1:00am   
2022 7:21pm  
   
                                                                Zoloft Quantity:  
 0 Refills: 0   
Ordered: 16-Sep-2022 Leslie Bowers   
Status: Discontinued Generic   
Substitution Allowed  
   
                                                            take 2 tablets by mo  
uth once   
daily                                   sertraline (ZOLOFT) 100 MG tablet   
Take 200 mg by mouth daily 0 Active  
  
  
  
                                        Comment on above:   Take 1.5 tablets by   
mouth once daily.   
   
                                                            50 mg.   
   
                                                    50 ml sodium chloride 9   
mg/ml injection  
(1 source)                                          Start:   
10-15-20  
20  
End:   
10-15-20  
20                                                  0.9 % sodium   
chloride bolus  
   
                                                    sulfamethoxazole 800 mg   
/ trimethoprim 160 mg   
oral tablet  
(3 sources)                             Dihydrofolate   
Reductase Inhibitor   
Antibacterial,   
Sulfonamide   
Antimicrobial                           Start:   
20  
End:   
20                                      take 1 tablet   
by mouth   
twice daily                             Sulfamethoxazole-Tr  
imethoprim   
Discontinued 1 TAB   
PO Twice daily 10   
March 1st, 2024   
1:00am 2024 3:30pm  
  
  
  
                                                    Start: 2020  
End: 11-                         take 1 tablet by mouth   
twice daily                             sulfamethoxazole-trimethoprim (BACTRIM   
DS;SEPTRA DS) 800-160 MG per tablet   
Indications: Boil of vulva Take 1 tablet by   
mouth 2 times daily for 7 days 14 tablet 0   
2020 11/10/2020 Active  
  
  
  
                                                    traMADol   
hydrochloride 50 mg   
oral tablet  
(13 sources)              Opioid Agonist            Start:   
2019  
End: 2022                         take 0.5-1   
tablets by   
mouth every   
six hours as   
needed for   
pain                                    Tramadol (Ultram)   
50 mg tablet   
Discontinued 50   
MG PO Q6H 30  1:00am   
2022 7:21pm 1/2 -   
1 tab po q 6   
hours prn pain  
   
                                                    traZODone   
hydrochloride 50 mg   
oral tablet  
(19 sources)                            Serotonin Reuptake   
Inhibitor                               Start:   
2020                              take 1 tablet   
by mouth once   
daily at   
bedtime                                 traZODone   
(DESYREL) 50 mg   
tablet Take 1   
tablet by mouth   
daily at bedtime.   
0 2020   
Active  
   
                                        Comment on above:   Take 1 tablet by violet  
th daily at bedtime.   
   
                                                    24 hr venlafaxine   
75 mg extended   
release oral   
capsule  
(20 sources)                            Serotonin and   
Norepinephrine   
Reuptake Inhibitor                      Start:   
2020                              take 1   
capsule by   
mouth once   
daily                                   venlafaxine ER   
(EFFEXOR XR) 75   
mg 24 hr capsule   
Take 1 capsule by   
mouth once daily.   
0 2020   
Active  
  
  
  
                                                take 1 tablet by mouth once zacarias  
y venlafaxine (EFFEXOR) 75 MG tablet Take 75 mg  
by   
mouth daily 0 Active  
  
  
  
                                        Comment on above:   Take 1 capsule by mo  
uth once daily.   
  
  
  
Problems  
Active Problems  
  
  
                      Problem Classification Problem    Date       Documented Da  
te Episodic/Chronic  
   
                                                    Abdominal pain  
(18 sources)                            Abdominal pain;   
Translations:   
[Unspecified   
abdominal pain]                         Onset:   
2019  
Resolved:   
2019                Episodic  
   
                                                    Attention-deficit,   
conduct, and   
disruptive behavior   
disorders  
(1 source)                              Physical aggression;   
Translations:   
[Undersocialized   
conduct disorder,   
aggressive type,   
unspecified]                            2022          Chronic  
   
                                                    Early or threatened   
labor  
(14 sources)                            Premature uterine   
contraction;   
Translations: [False   
labor before 37   
completed weeks of   
gestation, third   
trimester]                              Onset:   
2019  
Resolved:   
2019                Episodic  
   
                                                    Fever of unknown   
origin  
(1 source)                              Fever; Translations:   
[Fever, unspecified   
fever cause]                                                Episodic  
   
                                                    Genitourinary symptoms   
and ill-defined   
conditions  
(1 source)                              Dysuria;   
Translations:   
[Dysuria]                                                   Episodic  
   
                                                    Inflammatory diseases   
of female pelvic   
organs  
(1 source)                              Furuncle of vulva;   
Translations: [Boil   
of vulva]                                                   Episodic  
   
                                                    Malaise and fatigue  
(1 source)                              Fatigue;   
Translations: [Other   
fatigue]                                2024          Episodic  
   
                                                    Menstrual disorders  
(1 source)                              Menorrhagia;   
Translations:   
[Menorrhagia with   
regular cycle]                                              Chronic  
   
                                                    Mood disorders  
(20 sources)                            Depressive disorder;   
Translations:   
[Moderate recurrent   
major depression]                       Onset:   
2020                Chronic  
   
                                        Comment on above:   DEPRESSION (ACUTE)   
   
                                                    Mood disorders  
(2 sources)                             Mood swings;   
Translations:   
[Emotional lability]                     2022          Episodic  
   
                                                    Other bone disease and   
musculoskeletal   
deformities  
(2 sources)                             Disorder of bone;   
Translations:   
[Disorder of bone,   
unspecified]                                                Episodic  
   
                                                    Other complications of   
pregnancy  
(6 sources)                             Nausea and vomiting;   
Translations:   
[Vomiting of   
pregnancy,   
unspecified]                            2022          Episodic  
   
                                                    Other complications of   
pregnancy  
(1 source)                              Other specified   
pregnancy related   
conditions, second   
trimester;   
Translations:   
[Abdominal pain   
during pregnancy in   
second trimester]                       Onset:   
11-                                          Episodic  
   
                                                    Other complications of   
pregnancy  
(2 sources)                             High risk pregnancy;   
Translations:   
[Supervision of other   
high risk   
pregnancies, second   
trimester]                                                  Episodic  
   
                                                    Other complications of   
pregnancy  
(1 source)                              Supervision of   
pregnancy with   
history of pre-term   
labor, third   
trimester;   
Translations: [SUP   
PREG W/HX PRE-TERM   
LABR 3RD TRI]                           Onset:   
2023                                          Episodic  
   
                                                    Other complications of   
pregnancy  
(1 source)                              Vomiting of   
pregnancy,   
unspecified;   
Translations: [Nausea   
and vomiting during   
pregnancy]                              2022          Episodic  
   
                                                    Other endocrine   
disorders  
(20 sources)                            Reactive   
hypoglycemia;   
Translations: [Other   
hypoglycemia]                           Onset:   
12-                12-                Chronic  
   
                                                    Other female genital   
disorders  
(20 sources)                            Vaginal bleeding;   
Translations:   
[Abnormal uterine and   
vaginal bleeding,   
unspecified]                            2022          Chronic  
   
                                                    Other female genital   
disorders  
(4 sources)                             Vaginal discharge;   
Translations:   
[Vaginal discharge]                                         Episodic  
   
                                                    Other female genital   
disorders  
(1 source)                              Other specified   
noninflammatory   
disorders of vagina;   
Translations:   
[Vaginal discharge]                     Onset:   
11-                                          Episodic  
   
                                                    Other female genital   
disorders  
(4 sources)                             History of past   
delivery;   
Translations: [Status   
post vaginal   
delivery]                               2023          Episodic  
   
                                                    Other gastrointestinal   
disorders  
(3 sources)                             Irritable bowel   
syndrome;   
Translations:   
[Irritable bowel   
syndrome without   
diarrhea]                                                   Chronic  
   
                                                    Other nervous system   
disorders  
(2 sources)                             Other chronic pain;   
Translations:   
[Chronic bilateral   
low back pain without   
sciatica]                               Onset:   
02-                                          Chronic  
   
                                                    Other pregnancy and   
delivery including   
normal  
(20 sources)                            Delivery normal;   
Translations:   
[Encounter for   
full-term   
uncomplicated   
delivery]                               Onset:   
2019                Episodic  
   
                                                    Other upper   
respiratory infections  
(2 sources)                             Acute maxillary   
sinusitis;   
Translations: [Acute   
maxillary sinusitis,   
unspecified]                            2024          Episodic  
   
                                                    Residual codes;   
unclassified  
(1 source)                              33 weeks gestation of   
pregnancy;   
Translations: [33   
WEEKS GESTATION OF   
PREGNANCY]                              Onset:   
2023                                          Episodic  
   
                                                    Residual codes;   
unclassified  
(3 sources)                             Gestation period, 37   
weeks; Translations:   
[37 weeks gestation   
of pregnancy]                           2023          Episodic  
   
                                                    Spondylosis;   
intervertebral disc   
disorders; other back   
problems  
(10 sources)                            Displacement of   
lumbar intervertebral   
disc without   
myelopathy;   
Translations: [Other   
intervertebral disc   
displacement, lumbar   
region]                                                     Chronic  
   
                                                    Spondylosis;   
intervertebral disc   
disorders; other back   
problems  
(20 sources)                            Sacroiliac joint   
pain; Translations:   
[Sacrococcygeal   
disorders, not   
elsewhere classified]                   Onset:   
2022                                          Episodic  
   
                                                    Unclassified  
(2 sources)                             Patient encounter   
status; Translations:   
[Encounter for   
pregnancy test,   
result unknown]                                               
   
                                                    Unclassified  
(1 source)                              Cancer cervix   
screening status;   
Translations:   
[Screening for   
cervical cancer]                                              
   
                                                    Unclassified  
(5 sources)                                                       
   
                                                    Unclassified  
(1 source)      Violent behavior                 2022        
   
                                                    Unclassified  
(1 source)                              Severe episode of   
recurrent major   
depressive disorder,   
without psychotic   
features                                2022            
   
                                                    Unclassified  
(1 source)                              Vaginal yeast   
infection;   
Translations:   
[Vaginal yeast   
infection]                              Onset:   
11-                                            
   
                                                    Unclassified  
(2 sources)                             Chronic bilateral low   
back pain without   
sciatica;   
Translations:   
[Chronic bilateral   
low back pain without   
sciatica]                               Onset:   
02-                                            
   
                                                    Unclassified  
(1 source)                              Encounter for care   
and examination of   
lactating mother;   
Translations:   
[Encounter for care   
and examination of   
lactating mother]                       Onset:   
2023                                            
   
                                                    Unclassified  
(1 source)                              False labor at or   
after 37 completed   
weeks of gestation;   
Translations: [False   
labor at or after 37   
completed weeks of   
gestation]                              Onset:   
2023                                            
   
                                                    Unclassified  
(1 source)                              Encounter for   
 screening   
for Streptococcus B;   
Translations:   
[Encounter for   
 screening   
for Streptococcus B]                    Onset:   
2023                                            
   
                                                    Unclassified  
(1 source)                              Other specified   
pregnancy related   
conditions, third   
trimester;   
Translations: [Other   
specified pregnancy   
related conditions,   
third trimester]                        Onset:   
2023                                            
   
                                                    Unclassified  
(1 source)                              Hemorrhage in early   
pregnancy,   
unspecified;   
Translations:   
[Hemorrhage in early   
pregnancy,   
unspecified]                            Onset:   
2023                                            
   
                                                    Urinary tract   
infections  
(14 sources)                            Abscess of urethral   
gland; Translations:   
[Urinary tract   
infectious disease]                     2022          Episodic  
  
  
Past or Other Problems  
  
  
                      Problem Classification Problem    Date       Documented Da  
te Episodic/Chronic  
   
                                                    Diabetes or abnormal   
glucose tolerance   
complicating   
pregnancy; childbirth;   
or the puerperium  
(20 sources)                            Gestational diabetes   
mellitus;   
Translations:   
[History of   
gestational diabetes   
mellitus]                               Onset:   
2019                Episodic  
   
                                                    Headache; including   
migraine  
(12 sources)                            Headache;   
Translations:   
[Headache]                              Onset:   
2019                Episodic  
   
                                                    Nausea and vomiting  
(5 sources)                             Nausea, vomiting and   
diarrhea;   
Translations:   
[Nausea with   
vomiting,   
unspecified]                            Onset:   
10-                                          Episodic  
   
                                                    Other bone disease and   
musculoskeletal   
deformities  
(3 sources)                             Disorder of bone,   
unspecified;   
Translations:   
[Disorder of bone   
and cartilage,   
unspecified]                            Onset:   
2022                                          Episodic  
   
                                                    Other complications of   
pregnancy  
(1 source)                              Other specified   
pregnancy related   
conditions, first   
trimester;   
Translations: [OTH   
SPEC PREG RELATED   
COND 1ST TRI]                           Onset:   
10-                                          Episodic  
   
                                                    Other female genital   
disorders  
(1 source)                              Vaginal odor;   
Translations:   
[Vaginal odor]                                              Episodic  
   
                                                    Other gastrointestinal   
disorders  
(12 sources)                            Diarrhea;   
Translations:   
[Diarrhea,   
unspecified]                            Onset:   
2019                Episodic  
   
                                                    Other injuries and   
conditions due to   
external causes  
(1 source)                Laceration - injury       Onset:   
2022                                          Episodic  
   
                                                    Other nervous system   
disorders  
(1 source)                              Paresthesia of skin;   
Translations:   
[Paresthesias]                          Onset:   
2022                                          Episodic  
   
                                                    Other nervous system   
disorders  
(1 source)                              Other acute   
postprocedural pain;   
Translations: [OTHER   
ACUTE POSTPROCEDURAL   
PAIN]                                   Onset:   
2022                                          Episodic  
   
                                                    Other screening for   
suspected conditions   
(not mental disorders   
or infectious disease)  
(5 sources)                             Patient encounter   
status;   
Translations:   
[Encounter for other   
specified    
screening]                              Onset:   
2022                                          Episodic  
   
                                                    Other skin disorders  
(12 sources)                            Night sweats;   
Translations:   
[Generalized   
hyperhidrosis]                          Onset:   
2019                Episodic  
   
                                                    Ovarian cyst  
(1 source)                              Unspecified ovarian   
cyst, unspecified   
side; Translations:   
[UNSPECIFIED OVARIAN   
CYST UNSP SIDE]                         Onset:   
2022                                          Episodic  
   
                                                    Residual codes;   
unclassified  
(1 source)                              11 weeks gestation   
of pregnancy;   
Translations: [11   
WEEKS GESTATION OF   
PREGNANCY]                              Onset:   
10-                                          Episodic  
   
                                                    Residual codes;   
unclassified  
(2 sources)                             37 weeks gestation   
of pregnancy;   
Translations:   
[Pregnant state,   
incidental]                             Onset:   
2023                Episodic  
   
                                                    Residual codes;   
unclassified  
(1 source)                              35 weeks gestation   
of pregnancy;   
Translations: [35   
weeks gestation of   
pregnancy]                              Onset:   
2023                                          Episodic  
   
                                                    Residual codes;   
unclassified  
(1 source)                              25 weeks gestation   
of pregnancy;   
Translations: [25   
weeks gestation of   
pregnancy]                              Onset:   
2023                                          Episodic  
   
                                                    Screening and history   
of mental health and   
substance abuse codes  
(1 source)                              Personal history of   
nicotine dependence;   
Translations:   
[PERSONAL HISTORY OF   
NICOTINE DEPEND]                        Onset:   
10-                                          Episodic  
   
                                                    Unclassified  
(1 source)                              Contact with and   
(suspected) exposure   
to covid-19 Z20.822                     Onset:   
2022  
Resolved:   
2022                                            
   
                                                    Unclassified  
(1 source)      PHYCH CHECK IN                  2022        
   
                                        Comment on above:   PHYCH CHECK IN   
   
                                                    Viral infection  
(1 source)                COVID-19                  Onset:   
2022  
Resolved:   
2022                                            
  
  
  
Results  
  
  
                          Test Name    Value        Interpretation Reference   
Range                                   Facility  
   
                                                    Basophils Auto (Bld) [#/Vol]  
Ordered By: MDBEN Sethi on 2023   
   
                                                    Basophils (Bld)   
[#/Vol]         0.1 10*3/uL                     0.0-0.2         [facility]  
   
                                                    Basophils/100 WBC Auto (Bld)  
Ordered By: MARTINA Sethi on 2023   
   
                                                    Basophils/100 WBC   
(Bld)           0.9 %                           .               [facility]  
   
                                                    Complete Blood Count Auto Di  
ffon 2023   
   
                                                    Basophils (Bld)   
[#/Vol]         0.1 10*3/uL     Normal          0.0-0.2         [facility]  
   
                                        Comment on above:   Order Comment: Comme  
nt Draw at 630 am   
   
                                                            Result Comment: PERF  
ORMED BY:  
[place] [address]  
[phone]  
PATHOLOGIST MEDICAL DIRECTOR  
BASILIO CALI M.D.   
   
                                                            Performed By: #### C  
BC ####  
[facility]  
[address] [place] [address]   
   
                                                    Basophils/100 WBC   
(Bld)           0.9 %           Normal          .               [facility]  
   
                                        Comment on above:   Order Comment: Comme  
nt Draw at 630 am   
   
                                                            Performed By: #### C  
BC ####  
[facility]  
[address] [place] [address]   
   
                                                    Eosinophils (Bld)   
[#/Vol]         0.1 10*3/uL     Normal          0.0-0.45        [facility]  
   
                                        Comment on above:   Order Comment: Comme  
nt Draw at 630 am   
   
                                                            Performed By: #### C  
BC ####  
[facility]  
[address] [place] [address]   
   
                                                    Eosinophils/100 WBC   
(Bld)           1.6 %           Normal          .               [facility]  
   
                                        Comment on above:   Order Comment: Comme  
nt Draw at 630 am   
   
                                                            Performed By: #### C  
BC ####  
[facility]  
[address] [place] [address]   
   
                                                    Erythrocyte   
distribution width   
(RBC) [Ratio]   13.1 %          Normal          11.9-15.3       [facility]  
   
                                        Comment on above:   Order Comment: Comme  
nt Draw at 630 am   
   
                                                            Performed By: #### C  
BC ####  
[facility]  
[address] [place] [address]   
   
                                                    Hematocrit (Bld)   
[Volume fraction] 26.7 %          Low             34.0-46.4       [facility]  
   
                                        Comment on above:   Order Comment: Comme  
nt Draw at 630 am   
   
                                                            Performed By: #### C  
BC ####  
[address]   
   
                                                    Hemoglobin (Bld)   
[Mass/Vol]      9.4 g/dL        Low             11.8-15.4       [facility]  
   
                                        Comment on above:   Order Comment: Comme  
nt Draw at 630 am   
   
                                                            Performed By: #### C  
BC ####  
[address]   
   
                                                    Lymphocytes (Bld)   
[#/Vol]         1.6 10*3/uL     Normal          1.00-4.8        [facility]  
   
                                        Comment on above:   Order Comment: Comme  
nt Draw at 630 am   
   
                                                            Performed By: #### C  
BC ####  
[address]   
   
                                                    Lymphocytes/100 WBC   
(Bld)           21.2 %          Normal          .               [facility]  
   
                                        Comment on above:   Order Comment: Comme  
nt Draw at 630 am   
   
                                                            Performed By: #### C  
BC ####  
[address]   
   
                                                    MCH (RBC) [Entitic   
mass]           31.6 pg         Normal          24.7-34.3       [facility]  
   
                                        Comment on above:   Order Comment: Comme  
nt Draw at 630 am   
   
                                                            Performed By: #### C  
BC ####  
[address]   
   
                                                    MCV (RBC) [Entitic   
vol]            90.0 fL         Normal                    [facility]  
   
                                        Comment on above:   Order Comment: Comme  
nt Draw at 630 am   
   
                                                            Performed By: #### C  
BC ####  
[address]   
   
                                                    Mean Corpuscular HGB   
Conc            35.1 g/dL       High            32.0-35.0       [facility]  
   
                                        Comment on above:   Order Comment: Comme  
nt Draw at 630 am   
   
                                                            Performed By: #### C  
BC ####  
[address]   
   
                                                    Monocytes (Bld)   
[#/Vol]         0.6 10*3/uL     Normal          0.0-0.8         [facility]  
   
                                        Comment on above:   Order Comment: Comme  
nt Draw at 630 am   
   
                                                            Performed By: #### C  
BC ####  
[facility]  
[address]   
   
                                                    Monocytes/100 WBC   
(Bld)           7.1 %           Normal          .               [facility]  
   
                                        Comment on above:   Order Comment: Comme  
nt Draw at 630 am   
   
                                                            Performed By: #### C  
BC ####  
[address]   
   
                                                    Neutrophils (Bld)   
[#/Vol]         5.4 10*3/uL     Normal          1.8-7.7         [facility]  
   
                                        Comment on above:   Order Comment: Comme  
nt Draw at 630 am   
   
                                                            Performed By: #### C  
BC ####  
[address]   
   
                                                    Neutrophils/100 WBC   
(Bld)           69.2 %          Normal          .               [facility]  
   
                                        Comment on above:   Order Comment: Comme  
nt Draw at 630 am   
   
                                                            Performed By: #### C  
BC ####  
[address]   
   
                      NRBC%      0.0 /100{WBC} Normal     0-0.5      [facility]  
   
                                        Comment on above:   Order Comment: Comme  
nt Draw at 630 am   
   
                                                            Performed By: #### C  
BC ####  
[address]   
   
                                                    Platelet mean volume   
(Bld) [Entitic vol] 6.9 fL          Normal          6.3-10.7        [facility]  
   
                                        Comment on above:   Order Comment: Comme  
nt Draw at 630 am   
   
                                                            Performed By: #### C  
BC ####  
[address]   
   
                                                    Platelets (Bld)   
[#/Vol]         159 10*3/uL     Normal          150-450         [facility]  
   
                                        Comment on above:   Order Comment: Comme  
nt Draw at 630 am   
   
                                                            Performed By: #### C  
BC ####  
[address]   
   
                      RBC (Bld) [#/Vol] 2.97 10*6/uL Low        3.60-5.00  [facility]  
   
                                        Comment on above:   Order Comment: Comme  
nt Draw at 630 am   
   
                                                            Performed By: #### C  
BC ####  
[facility]  
[address] [place] [address] [place]   
   
                      WBC (Bld) [#/Vol] 7.8 10*3/uL Normal     3.8-11.6   [facility]  
   
                                        Comment on above:   Order Comment: Comme  
nt Draw at 630 am   
   
                                                            Performed By: #### C  
BC ####  
[facility]  
[address]   
   
                                                    Eosinophils Auto (Bld) [#/Vo  
l]Ordered By: MARTINA Sethi on 2023   
   
                                                    Eosinophils (Bld)   
[#/Vol]         0.1 10*3/uL                     0.0-0.45        [facility]  
   
                                                    Eosinophils/100 WBC Auto (Bl  
d)Ordered By: MARTINA Sethi on 2023   
   
                                                    Eosinophils/100 WBC   
(Bld)           1.6 %                           .               [facility]  
   
                                                    Erythrocyte distribution wid  
th Auto (RBC) [Ratio]Ordered By: MARTINA Sethi on   
2023   
   
                                                    Erythrocyte   
distribution width   
(RBC) [Ratio]   13.1 %                          11.9-15.3       [facility]  
   
                                                    Hematocrit Auto (Bld) [Volum  
e fraction]Ordered By: MARTINA Sethi on   
2023   
   
                                                    Hematocrit (Bld)   
[Volume fraction] 26.7 %                          34.0-46.4       [facility]  
   
                                                    Hemoglobin [Mass/volume] in   
BloodOrdered By: MARTINA Sethi on 2023   
   
                                                    Hemoglobin (Bld)   
[Mass/Vol]      9.4 g/dL                        11.8-15.4       [facility]  
   
                                                    Leukocytes [#/volume] correc  
kassandra for nucleated erythrocytes in Blood by Automated  
   
counOrdered By: MARTINA Sethi on 2023   
   
                                                    WBC corrected for   
nucl RBC Auto (Bld)   
[#/Vol]         7.8 10*3/uL                     3.8-11.6        [facility]  
   
                                                    Lymphocytes Auto (Bld) [#/Vo  
l]Ordered By: AMRTINA Sethi on 2023   
   
                                                    Lymphocytes (Bld)   
[#/Vol]         1.6 10*3/uL                     1.00-4.8        [facility]  
   
                                                    Lymphocytes/100 WBC Auto (Bl  
d)Ordered By: MARTINA Sethi on 2023   
   
                                                    Lymphocytes/100 WBC   
(Bld)           21.2 %                          .               [facility]  
   
                                                    MCH Auto (RBC) [Entitic mass  
]Ordered By: MARTINA Sethi on 2023   
   
                                                    MCH (RBC) [Entitic   
mass]           31.6 pg                         24.7-34.3       [facility]  
   
                                                    MCHC Auto (RBC) [Mass/Vol]Or  
dered By: MARTINA Sethi on 2023   
   
                      MCHC (RBC) [Mass/Vol] 35.1 g/dL             32.0-35.0  [facility]  
[facility]  
   
                                                    MCV Auto (RBC) [Entitic vol]  
Ordered By: MARTINA Sethi on 2023   
   
                                                    MCV (RBC) [Entitic   
vol]            90.0 fL                                   [facility]  
   
                                                    Monocytes Auto (Bld) [#/Vol]  
Ordered By: MARTINA Sethi on 2023   
   
                                                    Monocytes (Bld)   
[#/Vol]         0.6 10*3/uL                     0.0-0.8         [facility]  
   
                                                    Monocytes/100 WBC Auto (Bld)  
Ordered By: MARTINA Sethi on 2023   
   
                                                    Monocytes/100 WBC   
(Bld)           7.1 %                           .               [facility]  
   
                                                    Neutrophils Auto (Bld) [#/Vo  
l]Ordered By: MARTINA Sethi on 2023   
   
                                                    Neutrophils (Bld)   
[#/Vol]         5.4 10*3/uL                     1.8-7.7         [facility]  
   
                                                    Neutrophils/100 WBC Auto (Bl  
d)Ordered By: MARTINA Sethi on 2023   
   
                                                    Neutrophils/100 WBC   
(Bld)           69.2 %                          .               [facility]  
   
                                                    Nucleated erythrocytes [Pres  
ence] in Blood by Automated countOrdered By: MARTINA Sethi on 2023   
   
                                                    Nucleated RBC Auto Ql   
(Bld)           0.0 /100{WBC}                   0-0.5           [facility]  
   
                                                    Platelet mean volume Auto (B  
ld) [Entitic vol]Ordered By: MARTINA Sethi on  
  
2023   
   
                                                    Platelet mean volume   
(Bld) [Entitic vol] 6.9 fL                          6.3-10.7        [facility]  
   
                                                    Platelets Auto (Bld) [#/Vol]  
Ordered By: MARTINA Sethi on 2023   
   
                                                    Platelets (Bld)   
[#/Vol]         159 10*3/uL                     150-450         [facility]  
   
                                                    RBC Auto (Bld) [#/Vol]Ordere  
d By: MARTINA Sethi on 2023   
   
                      RBC (Bld) [#/Vol] 2.97 10*6/uL            3.60-5.00  [facility]  
   
                                                    WBC Auto (Bld) [#/Vol]Ordere  
d By: MARTINA Sethi on 2023   
   
                      WBC (Bld) [#/Vol] 7.8 10*3/uL            3.8-11.6   [facility]  
   
                                                    ABO/RH Typeon 2023   
   
                                                    ABO and Rh group Nom   
(Bld)                                   Blood group O Rh(D)   
positive            Normal                                  [facility]  
   
                                        Comment on above:   Result Comment: PERF  
ORMED BY:  
[place] [address]  
[phone]  
PATHOLOGIST MEDICAL DIRECTOR  
BASILIO CALI M.D.   
   
                                                    Amnisure(Pamg-1)on 04-  
3   
   
                      Amnisure   Negative   Normal     Negative   [facility]  
   
                                        Comment on above:   Result Comment: PERF  
ORMED BY:  
[place] [address]  
[phone]  
PATHOLOGIST MEDICAL DIRECTOR  
BASILIO CALI M.D.   
   
                                                            Performed By: #### A  
MNISURE- ####  
[address]   
   
                                                    Amphetamine Screen Ql (U)Ord  
ered By: MARTINA Sethi on 2023   
   
                      Amphetamines Ql (U) Negative              Negative   [facility]  
   
                                                    Automated erythrocytes count  
 in urine sediment (number/area)Ordered By: MARTINA Sethi on 2023   
   
                                                    RBC Auto (Urine sed)   
[#/Area]        0-1 [HPF]                       0-4             [facility]  
   
                                                    Automated leukocytes count i  
n urine sediment (number/area)Ordered By: MARTINA Sethi on 2023   
   
                                                    WBC Auto (Urine sed)   
[#/Area]        3-4 [HPF]                       0-4             [facility]  
   
                                                    Barbiturates [Presence] in U  
rine by Screen methodOrdered By: MARITNA Sethi on   
2023   
   
                                                    Barbiturates Screen   
Ql (U)          Negative                        Negative        [facility]  
   
                                                    Benzodiazepines Screen Ql (U  
)Ordered By: MARTINA Sethi on 2023   
   
                                                    Benzodiazepines Ql   
(U)             Negative                        Negative        [facility]  
   
                                                    Benzoylecgonine [Presence] i  
n Urine by Screen methodOrdered By: MARTINA Sethi   
on 2023   
   
                                                    Benzoylecgonine   
Screen Ql (U)   Negative                        Negative        [facility]  
   
                                                    Bilirubin Test strip Ql (U)O  
rdered By: MARTINA Sethi on 2023   
   
                      Bilirubin Ql (U) Negative              Negative   [facility]  
   
                                                    Color Auto (U)Ordered By: MD BEN Sethi on 2023   
   
                      Color (U)  Yellow                Yellow     [facility]  
   
                                                    Complete Blood Count Auto Di  
ffon 2023   
   
                                                    Basophils (Bld)   
[#/Vol]         0.1 10*3/uL     Normal          0.0-0.2         [facility]  
   
                                        Comment on above:   Result Comment: PERF  
ORMED BY:  
[place] [address]  
[phone]  
PATHOLOGIST MEDICAL DIRECTOR  
BASILIO CALI M.D.   
   
                                                            Performed By: #### R  
NE W RFX ####  
[facility]#[id number]  
,  
#### CBC ####  
[facility]  
[address] [place] [address] [place]   
   
                                                    Basophils/100 WBC   
(Bld)           0.7 %           Normal          .               [facility]  
   
                                        Comment on above:   Performed By: #### R  
NE W RFX ####  
[facility]#[id number]  
,  
#### CBC ####  
[facility]  
[address] [place] [address] [place]   
   
                                                    Eosinophils (Bld)   
[#/Vol]         0.1 10*3/uL     Normal          0.0-0.45        [facility]  
   
                                        Comment on above:   Performed By: #### R  
NE W RFX ####  
[facility]#[id number]  
,  
#### CBC ####  
[address]   
   
                                                    Eosinophils/100 WBC   
(Bld)           0.8 %           Normal          .               [facility]  
   
                                        Comment on above:   Performed By: #### R  
NE W RFX ####  
[facility]#[id number]  
,  
#### CBC ####  
[address]   
   
                                                    Erythrocyte   
distribution width   
(RBC) [Ratio]   12.5 %          Normal          11.9-15.3       [facility]  
   
                                        Comment on above:   Performed By: #### R  
NE W RFX ####  
[facility]#[id number]  
,  
#### CBC ####  
[address]   
   
                                                    Hematocrit (Bld)   
[Volume fraction] 28.5 %          Low             34.0-46.4       [facility]  
   
                                        Comment on above:   Performed By: #### R  
NE W RFX ####  
[facility]#[id number]  
,  
#### CBC ####  
[address]   
   
                                                    Hemoglobin (Bld)   
[Mass/Vol]      10.1 g/dL       Low             11.8-15.4       [facility]  
   
                                        Comment on above:   Performed By: #### R  
NE W RFX ####  
[facility]#[id number]  
,  
#### CBC ####  
[facility]  
[address] [place] [address]   
   
                                                    Lymphocytes (Bld)   
[#/Vol]         1.4 10*3/uL     Normal          1.00-4.8        [facility]  
   
                                        Comment on above:   Performed By: #### R  
NE W RFX ####  
[facility]#[id number]  
,  
#### CBC ####  
[facility]  
[address] [place] [address]   
   
                                                    Lymphocytes/100 WBC   
(Bld)           16.5 %          Normal          .               [facility]  
   
                                        Comment on above:   Performed By: #### R  
NE W RFX ####  
[facility]#[id number]  
,  
#### CBC ####  
[facility]  
[address] [place] [address]   
   
                                                    MCH (RBC) [Entitic   
mass]           31.7 pg         Normal          24.7-34.3       [facility]  
   
                                        Comment on above:   Performed By: #### R  
NE W RFX ####  
[facility]#[id number]  
,  
#### CBC ####  
[facility]  
[address] [place] [address]   
   
                                                    MCV (RBC) [Entitic   
vol]            89.3 fL         Normal                    [facility]  
   
                                        Comment on above:   Performed By: #### R  
NE W RFX ####  
[facility]#[id number]  
,  
#### CBC ####  
[address]   
   
                                                    Mean Corpuscular HGB   
Conc            35.5 g/dL       High            32.0-35.0       [facility]  
   
                                        Comment on above:   Performed By: #### R  
NE W RFX ####  
[facility]#[id number]  
,  
#### CBC ####  
[address]   
   
                                                    Monocytes (Bld)   
[#/Vol]         0.5 10*3/uL     Normal          0.0-0.8         [facility]  
   
                                        Comment on above:   Performed By: #### R  
NE W RFX ####  
[facility]#[id number]  
,  
#### CBC ####  
[facility]  
[address] [place] [address]   
   
                                                    Monocytes/100 WBC   
(Bld)           6.2 %           Normal          .               [facility]  
   
                                        Comment on above:   Performed By: #### R  
NE W RFX ####  
[facility]#[id number]  
,  
#### CBC ####  
[facility]  
[address] [place] [address]   
   
                                                    Neutrophils (Bld)   
[#/Vol]         6.4 10*3/uL     Normal          1.8-7.7         [facility]  
   
                                        Comment on above:   Performed By: #### R  
NE W RFX ####  
[facility]#[id number]  
,  
#### CBC ####  
[facility]  
[address]   
   
                                                    Neutrophils/100 WBC   
(Bld)           75.8 %          Normal          .               [facility]  
   
                                        Comment on above:   Performed By: #### R  
NE W RFX ####  
[facility]#[id number]  
,  
#### CBC ####  
[facility]  
[address] [place] [address]   
   
                      NRBC%      0.1 /100{WBC} Normal     0-0.5      [facility]  
   
                                        Comment on above:   Performed By: #### R  
NE W RFX ####  
[facility]#[id number]  
,  
#### CBC ####  
[address]   
   
                                                    Platelet mean volume   
(Bld) [Entitic vol] 7.2 fL          Normal          6.3-10.7        [facility]  
   
                                        Comment on above:   Performed By: #### R  
NE W RFX ####  
[facility]#[id number]  
,  
#### CBC ####  
[facility]  
[address] [place] [address]   
   
                                                    Platelets (Bld)   
[#/Vol]         189 10*3/uL     Normal          150-450         [facility]  
   
                                        Comment on above:   Performed By: #### R  
NE W RFX ####  
[facility]#[id number]  
,  
#### CBC ####  
[facility]  
[address] [place] [address]   
   
                      RBC (Bld) [#/Vol] 3.19 10*6/uL Low        3.60-5.00  [facility]  
   
                                        Comment on above:   Performed By: #### R  
NE W RFX ####  
[facility]#[id number]  
,  
#### CBC ####  
[facility]  
[address] [place] [address]   
   
                      WBC (Bld) [#/Vol] 8.4 10*3/uL Normal     3.8-11.6   [facility]  
   
                                        Comment on above:   Performed By: #### R  
NE W RFX ####  
[facility]#[id number]  
,  
#### CBC ####  
[place] [address] [place]   
   
                                                    Dipstick and Microscopicon 0  
2023   
   
                      Appearance (U) Clear      Normal     Clear      [facility]  
   
                                        Comment on above:   Order Comment: Name   
Collection Type:: Clean-Voided Midstream   
   
                                                            Performed By: #### A  
DDONUAPLUS, OBUDS ####  
[place] [address] [place]   
   
                      Bacteria,Urine 1+         High       None Seen  [facility]  
   
                                        Comment on above:   Order Comment: Name   
Collection Type:: Clean-Voided Midstream   
   
                                                            Performed By: #### A  
DDONUAPLUS, OBUDS ####  
[place] [address] [place]   
   
                      Bilirubin,Urine Negative   Normal     Negative   [facility]  
   
                                        Comment on above:   Order Comment: Name   
Collection Type:: Clean-Voided Midstream   
   
                                                            Performed By: #### A  
DDONUAPLUS, OBUDS ####  
[address]   
   
                      Color (U)  Yellow     Normal     Yellow     [facility]  
   
                                        Comment on above:   Order Comment: Name   
Collection Type:: Clean-Voided Midstream   
   
                                                            Performed By: #### A  
DDONUAPLUS, OBUDS ####  
[address]   
   
                      Glucose Ql (U) Normal     Normal     Normal     [facility]  
   
                                        Comment on above:   Order Comment: Name   
Collection Type:: Clean-Voided Midstream   
   
                                                            Performed By: #### A  
DDONUAPLUS, OBUDS ####  
[place] [address] [place]   
   
                      Hyaline Casts,Urine 0-8        Normal     0-8        [facility]  
   
                                        Comment on above:   Order Comment: Name   
Collection Type:: Clean-Voided Midstream   
   
                                                            Result Comment: PERF  
ORMED BY:  
[place] [address]  
[phone]  
PATHOLOGIST MEDICAL DIRECTOR  
BASILIO CALI M.D.   
   
                                                            Performed By: #### A  
DDONUAPLUS, OBUDS ####  
[place] [address] [place]   
   
                      Ketones Ql (U) Negative   Normal     Negative   [facility]  
   
                                        Comment on above:   Order Comment: Name   
Collection Type:: Clean-Voided Midstream   
   
                                                            Performed By: #### A  
ELMER OBGILSONS ####  
[address]   
   
                                                    Leukocyte esterase   
Test strip Ql (U) 1+              High            Negative        [facility]  
   
                                        Comment on above:   Order Comment: Name   
Collection Type:: Clean-Voided Midstream   
   
                                                            Performed By: #### A  
ELMER OBUDS ####  
[place] [address] [place]   
   
                      Nitrite,Urine Negative   Normal     Negative   [facility]  
   
                                        Comment on above:   Order Comment: Name   
Collection Type:: Clean-Voided Midstream   
   
                                                            Performed By: #### A  
ELMER OBUDS ####  
[address]   
   
                      Occult Blood,Urine Negative   Normal     Negative   [facility]  
   
                                        Comment on above:   Order Comment: Name   
Collection Type:: Clean-Voided Midstream   
   
                                                            Result Comment: PERF  
ORMED BY:  
[place] [address]  
[phone]  
PATHOLOGIST MEDICAL DIRECTOR  
BASILIO CALI M.D.   
   
                                                            Performed By: #### A  
ELMER OBUDS ####  
[address]   
   
                      pH (U)     6.5 [pH]   Normal     5.0-9.0    [facility]  
   
                                        Comment on above:   Order Comment: Name   
Collection Type:: Clean-Voided Midstream   
   
                                                            Performed By: #### A  
ELMER OBUDS ####  
[place] [address] [place]   
   
                      Protein,Urine Negative   Normal     Negative   [facility]  
   
                                        Comment on above:   Order Comment: Name   
Collection Type:: Clean-Voided Midstream   
   
                                                            Performed By: #### A  
ELMER OBUDS ####  
[place] [address] [place]   
   
                                                    RBC LM.HPF (Urine   
sed) [#/Area]   0 /[HPF]        Normal          0-4             [facility]  
   
                                        Comment on above:   Order Comment: Name   
Collection Type:: Clean-Voided Midstream   
   
                                                            Performed By: #### A  
DDONUAPLUS, OBUDS ####  
[facility]  
[address] [place] [address]   
   
                                                    Specificy   
Gravity,Urine   1.011           Normal          1.001-1.030     [facility]  
   
                                        Comment on above:   Order Comment: Name   
Collection Type:: Clean-Voided Midstream   
   
                                                            Performed By: #### A  
DDONUAPLUS, OBUDS ####  
[facility]  
[address] [place] [address]   
   
                                                    Squamous Epithelial   
Cell,Urine      3-4             High            0-2             [facility]  
   
                                        Comment on above:   Order Comment: Name   
Collection Type:: Clean-Voided Midstream   
   
                                                            Performed By: #### A  
DDONUAPLUS, OBUDS ####  
[address]   
   
                      Urobilinogen,Urine Normal     Normal     Normal     [facility]  
   
                                        Comment on above:   Order Comment: Name   
Collection Type:: Clean-Voided Midstream   
   
                                                            Performed By: #### A  
DDONUAPLUS, OBUDS ####  
[address]   
   
                      WBC,Urine  3-4        Normal     0-4        [facility]  
   
                                        Comment on above:   Order Comment: Name   
Collection Type:: Clean-Voided Midstream   
   
                                                            Performed By: #### A  
DDONUAPLUS, OBUDS ####  
[address]   
   
                                                    Ketones Auto test strip (U)   
[Mass/Vol]Ordered By: MARTINA Sethi on   
2023   
   
                                                    Ketones (U)   
[Mass/Vol]      Negative                        Negative        [facility]  
   
                                                    Laboratory - UrinalysisOrder  
ed By: MARTINA Sethi on 2023   
   
                                                    Hyaline casts LM Ql   
(Urine sed)     0-8 [LPF]                       0-8             [facility]  
   
                                                    Nitrite Test strip Ql (U)Ord  
ered By: MARTINA Sethi on 2023   
   
                      Nitrite Ql (U) Negative              Negative   [facility]  
   
                                                    No Panel InformationOrdered   
By: MARTINA Sethi on 2023   
   
                                                    Fetal Membranes   
Rupture (PAMG-1) Negative                        Negative        [facility]  
   
                                                    OB Urine Drug Screen (NO THC  
)on 2023   
   
                                                    Amphetamine   
Screen,Urine    Negative        Normal          Negative        [facility]  
   
                                        Comment on above:   Performed By: #### A  
DDONUAPLUS, OBUDS ####  
[address]   
   
                                                    Barbiturate   
Screen,Urine    Negative        Normal          Negative        [facility]  
   
                                        Comment on above:   Performed By: #### A  
DDONUAPLUS, OBUDS ####  
[facility]  
[address] [place] [address] [place]   
   
                                                    Benzodiazepines   
Screen,Urine    Negative        Normal          Negative        [facility]  
   
                                        Comment on above:   Performed By: #### A  
DDONUAPLUS, OBUDS ####  
[address]   
   
                      Cocaine Screen,Urine Negative   Normal     Negative   [facility]  
   
                                        Comment on above:   Performed By: #### A  
DDONUAPLUS, OBUDS ####  
[address]   
   
                      Opiate Screen,Urine Negative   Normal     Negative   [facility]  
   
                                        Comment on above:   Performed By: #### A  
DDONUAPLUS, OBUDS ####  
[address]   
   
                                                    Phencyclidine Screen,   
Urine           Negative        Normal          Negative        [facility]  
   
                                        Comment on above:   Result Comment: Thes  
e are unconfirmed results and should not   
be   
used for  
legal purposes.  
Drug Cut-Off Concentration:  
AMPH 1000 ng/mL  
LILIANE 200 ng/mL  
BEBE 200 ng/mL  
COCM 300 ng/mL  
 ng/mL  
PCP 25 ng/mL  
PERFORMED BY:  
[place] [address]  
[phone]  
PATHOLOGIST MEDICAL DIRECTOR  
BASILIO CALI M.D.   
   
                                                            Performed By: #### A  
DDONUAPLUS, OBUDS ####  
[address]   
   
                                                    Opiates [Presence] in Urine   
by Screen methodOrdered By: MARTINA Sethi on   
2023   
   
                      Opiates Screen Ql (U) Negative              Negative   [facility]  
   
                                                    Phencyclidine Screen Ql (U)O  
rdered By: MARTINA Sethi on 2023   
   
                      Phencyclidine Ql (U) Negative              Negative   [facility]  
   
                                        Comment on above:   These are unconfirme  
d results and should not be used for legal  
   
purposes. Drug Cut-Off Concentration: AMPH 1000 ng/mL LILIANE 200   
ng/mL BEBE 200 ng/mL COCM 300 ng/mL  ng/mL PCP 25 ng/mL   
   
                                                    Protein Auto test strip (U)   
[Mass/Vol]Ordered By: MARTINA Sethi on   
2023   
   
                                                    Protein (U)   
[Mass/Vol]      Negative                        Negative        [facility]  
   
                                                    RPR w/rfx to Quant TP [facility]   
2023   
   
                      RPR, Rfx Quant RPR Non-Reactive Normal     Non Reactive Fi  
[facility]  
   
                                        Comment on above:   Result Comment: Perf  
ormed at:  - [facility]  
[address] [place], [place] [id number]  
: Hua Baltazar PhD, Phone: [phone]  
PERFORMED BY:  
[place] [address]  
[phone]  
PATHOLOGIST MEDICAL DIRECTOR  
BASILIO CALI M.D.   
   
                                                            Performed By: #### R  
NE W RFX ####  
[facility]#[id number]  
,  
#### CBC ####  
[address]   
   
                                                    Reagin Ab [Presence] in Seru  
m by RPROrdered By: MARTINA Sethi on   
2023   
   
                      Reagin Ab RPR Ql (S) Non-Reactive            Non Reactive   
[facility]  
   
                                        Comment on above:   Performed at:  - [facility]  
[facility] [id number] [place], [place]   
[id number] Director: Hua Baltazar PhD, Phone: [phone]   
   
                                                    Specific gravity Auto test s  
trip (U) [Rel density]Ordered By: MARTINA Sethi on   
2023   
   
                                                    Specific gravity (U)   
[Rel density]   1.011                           1.001-1.030     [facility]  
   
                                                    Squamous epithelial cells de  
tection in urine sediment by light microscopyOrdered  
By:   
MARTINA Sethi on 2023   
   
                                                    Epithelial   
cells.squamous LM Ql   
(Urine sed)     3-4 [HPF]                       0-2             [facility]  
   
                                                    Urine bacteria detection by   
automated methodOrdered By: MARTINA Sethi on   
2023   
   
                      Bacteria Auto Ql (U) 1+                    None Seen  [facility]  
   
                                                    Urine clarity by refractomet  
ry automatedOrdered By: MARTINA Sethi on   
2023   
   
                                                    Clarity Refractometry   
automated (U)   Clear                           Clear           [facility]  
   
                                                    Urine glucose measurement by  
 automated test strip (mass/volume)Ordered By: LULU Sethi on 2023   
   
                                                    Glucose Auto test   
strip (U) [Mass/Vol] Normal mg/dL                    Normal          [facility]  
   
                                                    Urine hemoglobin detection b  
y automated test stripOrdered By: MARTINA Sethi on   
2023   
   
                                                    Hemoglobin Auto test   
strip Ql (U)    Negative                        Negative        [facility]  
   
                                                    Urine leukocyte esterase det  
ection by automated test stripOrdered By: MARTINA Sethi on 2023   
   
                                                    Leukocyte esterase   
Auto test strip Ql   
(U)             1+                              Negative        [facility]  
   
                                                    Urobilinogen Auto test strip  
 (U) [Mass/Vol]Ordered By: MARTINA Sethi on   
2023   
   
                                                    Urobilinogen (U)   
[Mass/Vol]      Normal mg/dL                    Normal          [facility]  
   
                                                    pH Auto test strip (U)Ordere  
d By: MARTINA Sethi on 2023   
   
                      pH (U)     6.5 [pH]              5.0-9.0    [facility]  
   
                                                    Amphetamine Screen Ql (U)Ord  
ered By: IGLESIA AYALA on 2023   
   
                      Amphetamines Ql (U) Negative              Negative   [facility]  
   
                                                    Automated erythrocytes count  
 in urine sediment (number/area)Ordered By: IGLESIA AYALA   
on 2023   
   
                                                    RBC Auto (Urine sed)   
[#/Area]        0-1 [HPF]                       0-4             [facility]  
   
                                                    Automated leukocytes count i  
n urine sediment (number/area)Ordered By: IGLESIA AYALA on   
2023   
   
                                                    WBC Auto (Urine sed)   
[#/Area]        None seen [HPF]                 0-4             [facility]  
   
                                                    Barbiturates [Presence] in U  
rine by Screen methodOrdered By: IGLESIA AYALA on   
2023   
   
                                                    Barbiturates Screen   
Ql (U)          Negative                        Negative        [facility]  
   
                                                    Benzodiazepines Screen Ql (U  
)Ordered By: IGLESIA AYALA on 2023   
   
                                                    Benzodiazepines Ql   
(U)             Negative                        Negative        [facility]  
   
                                                    Benzoylecgonine [Presence] i  
n Urine by Screen methodOrdered By: IGLESIA AYALA on   
2023   
   
                                                    Benzoylecgonine   
Screen Ql (U)   Negative                        Negative        [facility]  
   
                                                    Bilirubin Test strip Ql (U)O  
rdered By: IGLESIA AYALA on 2023   
   
                      Bilirubin Ql (U) Negative              Negative   [facility]  
   
                                                    Color Auto (U)Ordered By: MERCEDES AYALA on 2023   
   
                      Color (U)  Yellow                Yellow     [facility]  
   
                                                    Dipstick and Microscopicon 0  
2023   
   
                      Appearance (U) Clear      Normal     Clear      [facility]  
   
                                        Comment on above:   Order Comment: Comme  
nt Draw at 630 am   
   
                                                            Performed By: #### C  
BC ####  
[facility]  
[address] [place] [address] [place]   
   
                      Bacteria,Urine None Seen  Normal     None Seen  [facility]  
   
                                        Comment on above:   Order Comment: Comme  
nt Draw at 630 am   
   
                                                            Performed By: #### C  
BC ####  
[facility]  
[address] [place] [address] [place]   
   
                      Bilirubin,Urine Negative   Normal     Negative   [facility]  
   
                                        Comment on above:   Order Comment: Comme  
nt Draw at 630 am   
   
                                                            Performed By: #### C  
BC ####  
[facility]  
[address] [place] [address] [place]   
   
                      Color (U)  Yellow     Normal     Yellow     [facility]  
   
                                        Comment on above:   Order Comment: Comme  
nt Draw at 630 am   
   
                                                            Performed By: #### C  
BC ####  
[facility]  
[address] [id number] [place]   
   
                      Glucose Ql (U) Normal     Normal     Normal     [facility]  
   
                                        Comment on above:   Order Comment: Comme  
nt Draw at 630 am   
   
                                                            Performed By: #### C  
BC ####  
[facility]  
[address] [place] [address] [place]   
   
                      Hyaline Casts,Urine 0-8        Normal     0-8        [facility]  
   
                                        Comment on above:   Order Comment: Comme  
nt Draw at 630 am   
   
                                                            Result Comment: PERF  
ORMED BY:  
[facility]  
[address] [id number]  
[phone]  
PATHOLOGIST MEDICAL DIRECTOR  
BASILIO CALI M.D.   
   
                                                            Performed By: #### C  
BC ####  
[facility]  
[address]   
   
                      Ketones Ql (U) Negative   Normal     Negative   [facility]  
   
                                        Comment on above:   Order Comment: Comme  
nt Draw at 630 am   
   
                                                            Performed By: #### C  
BC ####  
[facility]  
[address]   
   
                                                    Leukocyte esterase   
Test strip Ql (U) Negative        Normal          Negative        [facility]  
   
                                        Comment on above:   Order Comment: Comme  
nt Draw at 630 am   
   
                                                            Performed By: #### C  
BC ####  
[facility]  
[address]   
   
                      Nitrite,Urine Negative   Normal     Negative   [facility]  
   
                                        Comment on above:   Order Comment: Comme  
nt Draw at 630 am   
   
                                                            Performed By: #### C  
BC ####  
[address]   
   
                      Occult Blood,Urine Trace      High       Negative   [facility]  
   
                                        Comment on above:   Order Comment: Comme  
nt Draw at 630 am   
   
                                                            Result Comment: PERF  
ORMED BY:  
[place] [address]  
[phone]  
PATHOLOGIST MEDICAL DIRECTOR  
BASILIO CALI M.D.   
   
                                                            Performed By: #### C  
BC ####  
[address]   
   
                      pH (U)     7.0 [pH]   Normal     5.0-9.0    [facility]  
   
                                        Comment on above:   Order Comment: Comme  
nt Draw at 630 am   
   
                                                            Performed By: #### C  
BC ####  
[place] [address] [place]   
   
                      Protein,Urine Negative   Normal     Negative   [facility]  
   
                                        Comment on above:   Order Comment: Comme  
nt Draw at 630 am   
   
                                                            Performed By: #### C  
BC ####  
[place] [address] [place]   
   
                                                    RBC LM.HPF (Urine   
sed) [#/Area]   0 /[HPF]        Normal          0-4             [facility]  
   
                                        Comment on above:   Order Comment: Comme  
nt Draw at 630 am   
   
                                                            Performed By: #### C  
BC ####  
[facility]  
[address]   
   
                                                    Specificy   
Gravity,Urine   1.009           Normal          1.001-1.030     [facility]  
   
                                        Comment on above:   Order Comment: Comme  
nt Draw at 630 am   
   
                                                            Performed By: #### C  
BC ####  
[facility]  
[address]   
   
                                                    Squamous Epithelial   
Cell,Urine      0-1             Normal          0-2             [facility]  
   
                                        Comment on above:   Order Comment: Comme  
nt Draw at 630 am   
   
                                                            Performed By: #### C  
BC ####  
[facility]  
[address]   
   
                      Urobilinogen,Urine Normal     Normal     Normal     [facility]  
   
                                        Comment on above:   Order Comment: Comme  
nt Draw at 630 am   
   
                                                            Performed By: #### C  
BC ####  
[address]   
   
                      WBC,Urine  None Seen  Normal     0-4        [facility]  
   
                                        Comment on above:   Order Comment: Comme  
nt Draw at 630 am   
   
                                                            Performed By: #### C  
BC ####  
[address]   
   
                                                    Ketones Auto test strip (U)   
[Mass/Vol]Ordered By: IGLESIA AYALA on 2023   
   
                                                    Ketones (U)   
[Mass/Vol]      Negative                        Negative        [facility]  
   
                                                    Laboratory - UrinalysisOrder  
ed By: IGLESIA AYALA on 2023   
   
                                                    Hyaline casts LM Ql   
(Urine sed)     0-8 [LPF]                       0-8             [facility]  
   
                                                    Nitrite Test strip Ql (U)Ord  
ered By: IGLESIA AYALA on 2023   
   
                      Nitrite Ql (U) Negative              Negative   [facility]  
   
                                                    OB Urine Drug Screen (NO THC  
)on 2023   
   
                                                    Amphetamine   
Screen,Urine    Negative        Normal          Negative        [facility]  
   
                                        Comment on above:   Performed By: #### C  
BC ####  
[facility]  
[address] [place] [address] [place]   
   
                                                    Barbiturate   
Screen,Urine    Negative        Normal          Negative        [facility]  
   
                                        Comment on above:   Performed By: #### C  
BC ####  
[facility]  
[address] [place] [address] [place]   
   
                                                    Benzodiazepines   
Screen,Urine    Negative        Normal          Negative        [facility]  
   
                                        Comment on above:   Performed By: #### C  
BC ####  
[facility]  
[address]   
   
                      Cocaine Screen,Urine Negative   Normal     Negative   [facility]  
   
                                        Comment on above:   Performed By: #### C  
BC ####  
[facility]  
[address]   
   
                      Opiate Screen,Urine Negative   Normal     Negative   [facility]  
   
                                        Comment on above:   Performed By: #### C  
BC ####  
[address]   
   
                                                    Phencyclidine Screen,   
Urine           Negative        Normal          Negative        [facility]  
   
                                        Comment on above:   Result Comment: Thes  
e are unconfirmed results and should not   
be   
used for  
legal purposes.  
Drug Cut-Off Concentration:  
AMPH 1000 ng/mL  
LILIANE 200 ng/mL  
BEBE 200 ng/mL  
COCM 300 ng/mL  
 ng/mL  
PCP 25 ng/mL  
PERFORMED BY:  
[place] [address]  
[phone]  
PATHOLOGIST MEDICAL DIRECTOR  
ABSILIO CALI M.D.   
   
                                                            Performed By: #### C  
BC ####  
[address]   
   
                                                    Opiates [Presence] in Urine   
by Screen methodOrdered By: IGLESIA AYALA on   
2023   
   
                      Opiates Screen Ql (U) Negative              Negative   [facility]  
   
                                                    Phencyclidine Screen Ql (U)O  
rdered By: IGLESIA AYALA on 2023   
   
                      Phencyclidine Ql (U) Negative              Negative   [facility]  
   
                                        Comment on above:   These are unconfirme  
d results and should not be used for legal  
   
purposes. Drug Cut-Off Concentration: AMPH 1000 ng/mL LILIANE 200   
ng/mL BEBE 200 ng/mL COCM 300 ng/mL  ng/mL PCP 25 ng/mL   
   
                                                    Protein Auto test strip (U)   
[Mass/Vol]Ordered By: IGLESIA AYALA on 2023   
   
                                                    Protein (U)   
[Mass/Vol]      Negative                        Negative        [facility]  
   
                                                    Specific gravity Auto test s  
trip (U) [Rel density]Ordered By: IGLESIA AYALA on   
2023   
   
                                                    Specific gravity (U)   
[Rel density]   1.009                           1.001-1.030     [facility]  
   
                                                    Squamous epithelial cells de  
tection in urine sediment by light microscopyOrdered  
By:   
IGLESIA AYALA on 2023   
   
                                                    Epithelial   
cells.squamous LM Ql   
(Urine sed)     0-1 [HPF]                       0-2             [facility]  
   
                                                    Urine bacteria detection by   
automated methodOrdered By: IGLESIA AYALA on   
2023   
   
                      Bacteria Auto Ql (U) None seen             None Seen  [facility]  
   
                                                    Urine clarity by refractomet  
ry automatedOrdered By: IGLESIA AYALA on 2023   
   
                                                    Clarity Refractometry   
automated (U)   Clear                           Clear           [facility]  
   
                                                    Urine glucose measurement by  
 automated test strip (mass/volume)Ordered By: IGLESIA AYALA on 2023   
   
                                                    Glucose Auto test   
strip (U) [Mass/Vol] Normal mg/dL                    Normal          [facility]  
   
                                                    Urine hemoglobin detection b  
y automated test stripOrdered By: IGLESIA AYALA on   
2023   
   
                                                    Hemoglobin Auto test   
strip Ql (U)    Trace                           Negative        [facility]  
   
                                                    Urine leukocyte esterase det  
ection by automated test stripOrdered By: IGLESIA AYALA on   
2023   
   
                                                    Leukocyte esterase   
Auto test strip Ql   
(U)             Negative                        Negative        [facility]  
   
                                                    Urobilinogen Auto test strip  
 (U) [Mass/Vol]Ordered By: IGLESIA AYALA on   
2023   
   
                                                    Urobilinogen (U)   
[Mass/Vol]      Normal mg/dL                    Normal          [facility]  
   
                                                    pH Auto test strip (U)Ordere  
d By: IGLESIA AYALA on 2023   
   
                      pH (U)     7.0 [pH]              5.0-9.0    [facility]  
   
                                                    Group B Streptococcus cultur  
eOrdered By: RO DUFFY on 2023   
   
                                                    S. agalactiae Org   
specific cx Ql (Unsp   
spec)                                   No Group B Beta   
Streptococcus Isolated   
3 Days                                                      [facility]  
   
                                                    Strep B Cultureon 2023  
   
   
                                        Strep B Culture     Reason for Exam 35   
weeks gestation of   
pregnancy;  
screening for stre  
Vaginal/Rectal  
No Group B Beta   
Streptococcus Isolated   
3 Days  
PERFORMED BY:  
[facility]  
[address] [place] [address]  
[phone]  
PATHOLOGIST MEDICAL   
DIRECTOR  
BASILIO CALI M.D.    Normal                                  [facility]  
   
                                        Comment on above:   Performed By: #### C  
BC ####  
[facility]  
[address]   
   
                                                    UA (CLEAN/CATCH) CNS/MICRO I  
F IND.on 2023   
   
                      Bilirubin Ql (U) Negative   Normal     NEGATIVE   [facility]  
   
                                        Comment on above:   Performed By: #### U  
ACSIND ####[facility]   
[id number]   
[place] [address]Dr[address] Chalino Winters   
   
                      Clarity (U) CLEAR      Normal     CLEAR      [facility]  
   
                                        Comment on above:   Performed By: #### U  
ACSIND ####[facility]   
[id number]   
[place] [address]Dr[address] Chalino Winters   
   
                      Color (U)  LT. YELLOW Normal     YELLOW     [facility]  
   
                                        Comment on above:   Performed By: #### U  
ACSIND ####[facility]   
[id number]   
[place] [address]Dr[address] Krystalelio Winters   
   
                      Glucose Ql (U) Negative   Normal     NEGATIVE   [facility]  
   
                                        Comment on above:   Performed By: #### U  
ACSIND ####[facility]   
[id number]   
[place] [address]Dr[address] Krystalelio Winters   
   
                      Hemoglobin Ql (U) Negative   Normal     NEGATIVE   [facility]  
   
                                        Comment on above:   Performed By: #### U  
ACSIND ####[facility]   
[id number] [place], [place] [address]Dr. Chalino Winters   
   
                      Ketones Ql (U) Negative   Normal     NEGATIVE   [facility]  
   
                                        Comment on above:   Performed By: #### U  
ACSIND ####[facility]   
[id number]   
[place] [address]Dr[address] Chalino Winters   
   
                      LEUKOCYTES Negative   Normal     NEGATIVE   [facility]  
   
                                        Comment on above:   Performed By: #### U  
ACSIND ####[facility]   
[id number]   
[place] [address]Dr[address] Krystalelio Winters   
   
                      Nitrite Ql (U) Negative   Normal     NEGATIVE   [facility]  
   
                                        Comment on above:   Performed By: #### U  
ACSIND ####[facility]   
[id number]   
[place] [address]Dr[address] Krystalelio Winters   
   
                      pH (U)     7.0 [pH]   Normal     5-9        [facility]  
   
                                        Comment on above:   Performed By: #### U  
ACSIND ####[facility]   
[id number]   
[place] [id number] Chalino Winters   
   
                      SPEC GRAVITY <=1.005    Abnormal   1.005-<=1.025 [facility]  
   
                                        Comment on above:   Performed By: #### U  
ACSIND ####[facility]   
[id number]   
[place] [address]Dr[address] Chalino Winters   
   
                          UA PROTEIN   Negative     Normal       NEGATIVE/   
TRACE                                   [facility]  
   
                                        Comment on above:   Performed By: #### U  
ACSIND ####[facility]   
[id number]   
[place] [address]Dr[address] Chalino Winters   
   
                      UR MICRO IND NOT INDICATED Normal                [facility]  
   
                                        Comment on above:   Performed By: #### U  
ACSIND ####[facility]   
[id number]   
[place] [address]Dr[address] Chalino Winters   
   
                      Urobilinogen Qn (U) 0.2 {Aneta'U}/dL Normal     0.2 - 1.  
0  [facility]  
   
                                        Comment on above:   Performed By: #### U  
ACSIND ####[facility]   
[id number]   
[place] [address]Dr[address] Chalino Winters   
   
                                                    Amphetamine Screen Ql (U)Ord  
ered By: RO DUFFY on 2023   
   
                      Amphetamines Ql (U) Negative              Negative   [facility]  
   
                                                    Barbiturates [Presence] in U  
rineOrdered By: RO DUFFY on 2023   
   
                      Barbiturates Ql (U) Negative              Negative   [facility]  
   
                                                    Benzodiazepines [Presence] i  
n UrineOrdered By: RO DUFFY on 2023   
   
                                                    Benzodiazepines Ql   
(U)             Negative                        Negative        [facility]  
   
                                                    Bilirubin Test strip Ql (U)O  
rdered By: RO NATAPRDIVINA on 2023   
   
                      Bilirubin Ql (U) Negative              Negative   [facility]  
   
                                                    Color Auto (U)Ordered By: BRIANNA DUFFY on 2023   
   
                      Color (U)  Yellow                Yellow     [facility]  
   
                                                    Ketones Auto test strip (U)   
[Mass/Vol]Ordered By: RO DUFFY on 2023  
   
   
                                                    Ketones (U)   
[Mass/Vol]      Negative                        Negative        [facility]  
   
                                                    Laboratory - Drug toxicology  
Ordered By: RO DUFFY on 2023   
   
                      Opiates Ql (U) Negative              Negative   [facility]  
   
                                                    Nitrite Test strip Ql (U)Ord  
ered By: RO DUFFY on 2023   
   
                      Nitrite Ql (U) Negative              Negative   [facility]  
   
                                                    OB Urine Drug Screen (NO THC  
)on 2023   
   
                                                    Amphetamine   
Screen,Urine    Negative        Normal          Negative        [facility]  
   
                                        Comment on above:   Performed By: #### O  
BUDS, UA ####  
[address]   
   
                                                    Barbiturate   
Screen,Urine    Negative        Normal          Negative        [facility]  
   
                                        Comment on above:   Performed By: #### O  
BUDS, UA ####  
[place] [address] [place]   
   
                                                    Benzodiazepines   
Screen,Urine    Negative        Normal          Negative        [facility]  
   
                                        Comment on above:   Performed By: #### O  
BUDS, UA ####  
[place] [address] [place]   
   
                      Cocaine Screen,Urine Negative   Normal     Negative   [facility]  
   
                                        Comment on above:   Performed By: #### O  
BUDS, UA ####  
[facility]  
[address] [place] [address] [place]   
   
                      Opiate Screen,Urine Negative   Normal     Negative   [facility]  
   
                                        Comment on above:   Performed By: #### O  
BUDS, UA ####  
[place] [address] [place]   
   
                                                    Phencyclidine Screen,   
Urine           Negative        Normal          Negative        [facility]  
   
                                        Comment on above:   Result Comment: Thes  
e are unconfirmed results and should not   
be   
used for  
legal purposes.  
Drug Cut-Off Concentration:  
AMPH 1000 ng/mL  
LILIANE 200 ng/mL  
BEBE 200 ng/mL  
COCM 300 ng/mL  
 ng/mL  
PCP 25 ng/mL  
PERFORMED BY:  
[place] [address]  
[phone]  
PATHOLOGIST MEDICAL DIRECTOR  
BASILIO CALI M.D.   
   
                                                            Performed By: #### O  
BUDS, UA ####  
[address]   
   
                                                    Phencyclidine Screen Ql (U)O  
rdered By: RO DUFFY on 2023   
   
                      Phencyclidine Ql (U) Negative              Negative   [facility]  
   
                                        Comment on above:   These are unconfirme  
d results and should not be used for legal  
   
purposes. Drug Cut-Off Concentration: AMPH 1000 ng/mL LILIANE 200   
ng/mL BEBE 200 ng/mL COCM 300 ng/mL  ng/mL PCP 25 ng/mL   
   
                                                    Protein Auto test strip (U)   
[Mass/Vol]Ordered By: RO DUFFY on 2023  
   
   
                                                    Protein (U)   
[Mass/Vol]      Negative                        Negative        [facility]  
   
                                                    Specific gravity Auto test s  
trip (U) [Rel density]Ordered By: RO DUFFY   
on   
2023   
   
                                                    Specific gravity (U)   
[Rel density]   1.004                           1.001-1.030     [facility]  
   
                                                    Urinalysison 2023   
   
                      Appearance (U) Clear      Normal     Clear      [facility]  
   
                                        Comment on above:   Order Comment: Name   
Collection Type:: Voided   
   
                                                            Performed By: #### O  
BUDS, UA ####  
[facility]  
[address] [place] [address] [place]   
   
                      Bilirubin,Urine Negative   Normal     Negative   [facility]  
   
                                        Comment on above:   Order Comment: Name   
Collection Type:: Voided   
   
                                                            Performed By: #### O  
BUDS, UA ####  
[facility]  
[address] [place] [address] [place]   
   
                      Color (U)  Yellow     Normal     Yellow     [facility]  
   
                                        Comment on above:   Order Comment: Name   
Collection Type:: Voided   
   
                                                            Performed By: #### O  
BUDS, UA ####  
[facility]  
[address] [place] [address] [place]   
   
                      Glucose Ql (U) Normal     Normal     Normal     [facility]  
   
                                        Comment on above:   Order Comment: Name   
Collection Type:: Voided   
   
                                                            Performed By: #### O  
BUDS, UA ####  
[facility]  
[address] [id number] [place]   
   
                      Ketones Ql (U) Negative   Normal     Negative   [facility]  
   
                                        Comment on above:   Order Comment: Name   
Collection Type:: Voided   
   
                                                            Performed By: #### O  
BUDS, UA ####  
[facility]  
[address] [id number] [place]   
   
                                                    Leukocyte esterase   
Test strip Ql (U) Negative        Normal          Negative        [facility]  
   
                                        Comment on above:   Order Comment: Name   
Collection Type:: Voided   
   
                                                            Performed By: #### O  
BUDS, UA ####  
[place] [address] [place]   
   
                      Nitrite,Urine Negative   Normal     Negative   [facility]  
   
                                        Comment on above:   Order Comment: Name   
Collection Type:: Voided   
   
                                                            Performed By: #### O  
BUDS, UA ####  
[address]   
   
                      Occult Blood,Urine Negative   Normal     Negative   [facility]  
   
                                        Comment on above:   Order Comment: Name   
Collection Type:: Voided   
   
                                                            Result Comment: PERF  
ORMED BY:  
[place] [address]  
[phone]  
PATHOLOGIST MEDICAL DIRECTOR  
BASILIO CALI M.D.   
   
                                                            Performed By: #### O  
BUDS, UA ####  
[address]   
   
                      pH (U)     6.5 [pH]   Normal     5.0-9.0    [facility]  
   
                                        Comment on above:   Order Comment: Name   
Collection Type:: Voided   
   
                                                            Performed By: #### O  
BUDS, UA ####  
[place] [address] [place]   
   
                      Protein,Urine Negative   Normal     Negative   [facility]  
   
                                        Comment on above:   Order Comment: Name   
Collection Type:: Voided   
   
                                                            Performed By: #### O  
BUDS, UA ####  
[address]   
   
                                                    Specificy   
Gravity,Urine   1.004           Normal          1.001-1.030     [facility]  
   
                                        Comment on above:   Order Comment: Name   
Collection Type:: Voided   
   
                                                            Performed By: #### O  
BUDS, UA ####  
[place] [address] [place]   
   
                      Urobilinogen,Urine Normal     Normal     Normal     [facility]  
   
                                        Comment on above:   Order Comment: Name   
Collection Type:: Voided   
   
                                                            Performed By: #### O  
BUDS, UA ####  
[place] [address] [place]   
   
                                                    Urine clarity by refractomet  
ry automatedOrdered By: RO DUFFY on   
2023   
   
                                                    Clarity Refractometry   
automated (U)   Clear                           Clear           [facility]  
   
                                                    Urine cocaine detectionOrder  
ed By: RO DUFFY on 2023   
   
                      Cocaine Ql (U) Negative              Negative   [facility]  
   
                                                    Urine glucose measurement by  
 automated test strip (mass/volume)Ordered By: RO DUFFY on 2023   
   
                                                    Glucose Auto test   
strip (U) [Mass/Vol] Normal mg/dL                    Normal          [facility]  
   
                                                    Urine hemoglobin detection b  
y automated test stripOrdered By: RO DUFFY   
on   
2023   
   
                                                    Hemoglobin Auto test   
strip Ql (U)    Negative                        Negative        [facility]  
   
                                                    Urine leukocyte esterase det  
ection by automated test stripOrdered By: RO DUFFY on 2023   
   
                                                    Leukocyte esterase   
Auto test strip Ql   
(U)             Negative                        Negative        [facility]  
   
                                                    Urobilinogen Auto test strip  
 (U) [Mass/Vol]Ordered By: RO DUFFY on   
2023   
   
                                                    Urobilinogen (U)   
[Mass/Vol]      Normal mg/dL                    Normal          [facility]  
   
                                                    pH Auto test strip (U)Ordere  
d By: RO DUFFY on 2023   
   
                      pH (U)     6.5 [pH]              5.0-9.0    [facility]  
   
                                                    Amphetamine Screen Ql (U)Ord  
ered By: Juan Francisco Stoll on 2023   
   
                      Amphetamines Ql (U) Negative              Negative   [facility]  
   
                                                    Barbiturates [Presence] in U  
rineOrdered By: Juan Francisco Stoll on 2023   
   
                      Barbiturates Ql (U) Negative              Negative   [facility]  
   
                                                    Benzodiazepines [Presence] i  
n UrineOrdered By: Juan Francisco Stoll on 2023   
   
                                                    Benzodiazepines Ql   
(U)             Negative                        Negative        [facility]  
   
                                                    Bilirubin Test strip Ql (U)O  
rdered By: Juan Francisco Stoll on 2023   
   
                      Bilirubin Ql (U) Negative              Negative   [facility]  
   
                                                    Choriogonadotropin.beta subu  
nit [Units/volume] in Serum or PlasmaOrdered By:   
RO DUFFY on 2023   
   
                      [place] Qn 26[address].00 m[IU]/mL                       
  [facility]  
   
                                        Comment on above:   Approximate Approxim  
ate hCG Gestational Age Range (mIU/ml)   
(weeks)0.2-1 5-50 1-2  2-3 100-5,000 3-4 500-10,000 4-5   
1,000-50,000 5-6 10,000-100,000 6-8 15,000-200,000 8-12   
10,000-100,000   
   
                                                    Color Auto (U)Ordered By: Danny Stoll on 2023   
   
                      Color (U)  Yellow                Yellow     [facility]  
   
                                                    Fetal Fibronectinon 20  
23   
   
                      Fetal Fibronectin Negative   Normal     Negative   [facility]  
   
                                        Comment on above:   Order Comment: Comme  
nt Draw at 630 am   
   
                                                            Result Comment: PERF  
ORMED BY:  
[place] [address]  
[phone]  
PATHOLOGIST MEDICAL DIRECTOR  
BASILIO CALI M.D.   
   
                                                            Performed By: #### C  
BC ####  
[address]   
   
                                                    Fetal fibronectinOrdered By:  
 Juan Francisco Stoll on 2023   
   
                                                    Fibronectin.fetal   
(Vag fld) [Mass/Vol] Negative                        Negative        [facility]  
   
                                                    Glucose,1 Hour PP 50gm Doseo  
n 2023   
   
                      Glucose [Mass/Vol] 125 mg/dL  Normal          [facility]  
   
                                        Comment on above:   Order Comment: Comme  
nt Draw at 630 am   
   
                                                            Result Comment: PERF  
ORMED BY:  
[place] [address]  
[phone]  
PATHOLOGIST MEDICAL DIRECTOR  
BASILIO ACLI M.D.   
   
                                                            Performed By: #### C  
BC ####  
[place] [id number] [place]   
   
                                                    HCG,Quantitativeon   
3   
   
                      HCG,Quantitative 34857.00 m[iU]/mL Normal                F  
[facility]  
   
                                        Comment on above:   Order Comment: Comme  
nt Draw at 630 am   
   
                                                            Result Comment: Appr  
oximate Approximate hCG  
Gestational Age Range (mIU/ml)  
(weeks)  
0.2-1 5-50  
1-2   
2-3 100-5,000  
3-4 500-10,000  
4-5 1,000-50,000  
5-6 10,000-100,000  
6-8 15,000-200,000  
8-12 10,000-100,000   
   
                                                            Performed By: #### C  
BC ####  
[place] [address] [place]   
   
                                                    Hematocrit Auto (Bld) [Volum  
e fraction]Ordered By: RO DUFFY on   
2023   
   
                                                    Hematocrit (Bld)   
[Volume fraction] 29.8 %                          34.0-46.4       [facility]  
   
                                                    Hemoglobin [Mass/volume] in   
BloodOrdered By: RO DUFFY on 2023   
   
                                                    Hemoglobin (Bld)   
[Mass/Vol]      10.6 g/dL                       11.8-15.4       [facility]  
   
                                                    Hemoglobin and Hematocriton   
2023   
   
                                                    Hematocrit (Bld)   
[Volume fraction] 29.8 %          Low             34.0-46.4       [facility]  
   
                                        Comment on above:   Order Comment: Comme  
nt Draw at 630 am   
   
                                                            Result Comment: PERF  
ORMED BY:  
[place] [address]  
[phone]  
PATHOLOGIST MEDICAL DIRECTOR  
BASILIO CALI M.D.   
   
                                                            Performed By: #### C  
BC ####  
[facility]  
[address] [place] [address]   
   
                                                    Hemoglobin (Bld)   
[Mass/Vol]      10.6 g/dL       Low             11.8-15.4       [facility]  
   
                                        Comment on above:   Order Comment: Comme  
nt Draw at 630 am   
   
                                                            Performed By: #### C  
BC ####  
[address]   
   
                                                    Ketones Auto test strip (U)   
[Mass/Vol]Ordered By: Juan Francisco Stoll on 2023   
   
                                                    Ketones (U)   
[Mass/Vol]      Negative                        Negative        [facility]  
   
                                                    Laboratory - Drug toxicology  
Ordered By: Juan Francisco Stoll on 2023   
   
                      Opiates Ql (U) Negative              Negative   [facility]  
   
                                                    Nitrite Test strip Ql (U)Ord  
ered By: Juan Francisco Stoll on 2023   
   
                      Nitrite Ql (U) Negative              Negative   [facility]  
   
                                                    No Panel InformationOrdered   
By: RO DUFFY on 2023   
   
                                                    Glucose 1 Hour   
Postprandial (Timed) 125 mg/dL                                 [facility]  
   
                                                    OB Urine Drug Screen (NO THC  
)on 2023   
   
                                                    Amphetamine   
Screen,Urine    Negative        Normal          Negative        [facility]  
   
                                        Comment on above:   Performed By: #### U  
A, OBUDS ####  
[facility]  
[address] [place] [address]   
   
                                                    Barbiturate   
Screen,Urine    Negative        Normal          Negative        [facility]  
   
                                        Comment on above:   Performed By: #### U  
A, OBUDS ####  
[place] [address] [place]   
   
                                                    Benzodiazepines   
Screen,Urine    Negative        Normal          Negative        [facility]  
   
                                        Comment on above:   Performed By: #### U  
A, OBUDS ####  
[place] [address] [place]   
   
                      Cocaine Screen,Urine Negative   Normal     Negative   [facility]  
   
                                        Comment on above:   Performed By: #### U  
A, OBUDS ####  
[facility]  
[address] [place] [address] [place]   
   
                      Opiate Screen,Urine Negative   Normal     Negative   [facility]  
   
                                        Comment on above:   Performed By: #### U  
A, OBUDS ####  
[address]   
   
                                                    Phencyclidine Screen,   
Urine           Negative        Normal          Negative        [facility]  
   
                                        Comment on above:   Result Comment: Thes  
e are unconfirmed results and should not   
be   
used for  
legal purposes.  
Drug Cut-Off Concentration:  
AMPH 1000 ng/mL  
LILIANE 200 ng/mL  
BEBE 200 ng/mL  
COCM 300 ng/mL  
 ng/mL  
PCP 25 ng/mL  
PERFORMED BY:  
[place] [address]  
[phone]  
PATHOLOGIST MEDICAL DIRECTOR  
BASILIO CALI M.D.   
   
                                                            Performed By: #### U  
A, OBUDS ####  
[address]   
   
                                                    Phencyclidine Screen Ql (U)O  
rdered By: Juan Francisco Stoll on 2023   
   
                      Phencyclidine Ql (U) Negative              Negative   [facility]  
   
                                        Comment on above:   These are unconfirme  
d results and should not be used for legal  
   
purposes. Drug Cut-Off Concentration: AMPH 1000 ng/mL LILIANE 200   
ng/mL BEBE 200 ng/mL COCM 300 ng/mL  ng/mL PCP 25 ng/mL   
   
                                                    Protein Auto test strip (U)   
[Mass/Vol]Ordered By: Juan Francisco Stoll on 2023   
   
                                                    Protein (U)   
[Mass/Vol]      Negative                        Negative        [facility]  
   
                                                    Specific gravity Auto test s  
trip (U) [Rel density]Ordered By: Juan Francisco Stoll on   
2023   
   
                                                    Specific gravity (U)   
[Rel density]   1.008                           1.001-1.030     [facility]  
   
                                                    Urinalysison 2023   
   
                      Appearance (U) Clear      Normal     Clear      [facility]  
   
                                        Comment on above:   Order Comment: Name   
Collection Type:: Clean-Voided Midstream   
   
                                                            Performed By: #### U  
A, OBUDS ####  
[facility]  
[address] [place] [address] [place]   
   
                      Bilirubin,Urine Negative   Normal     Negative   [facility]  
   
                                        Comment on above:   Order Comment: Name   
Collection Type:: Clean-Voided Midstream   
   
                                                            Performed By: #### U  
A, OBUDS ####  
[place] [address] [place]   
   
                      Color (U)  Yellow     Normal     Yellow     [facility]  
   
                                        Comment on above:   Order Comment: Name   
Collection Type:: Clean-Voided Midstream   
   
                                                            Performed By: #### U  
A, OBUDS ####  
[place] [address] [place]   
   
                      Glucose Ql (U) Normal     Normal     Normal     [facility]  
   
                                        Comment on above:   Order Comment: Name   
Collection Type:: Clean-Voided Midstream   
   
                                                            Performed By: #### U  
A, OBUDS ####  
[facility]  
[address] [place] [address] [place]   
   
                      Ketones Ql (U) Negative   Normal     Negative   [facility]  
   
                                        Comment on above:   Order Comment: Name   
Collection Type:: Clean-Voided Midstream   
   
                                                            Performed By: #### U  
A, OBUDS ####  
[facility]  
[address] [place] [address] [place]   
   
                                                    Leukocyte esterase   
Test strip Ql (U) Negative        Normal          Negative        [facility]  
   
                                        Comment on above:   Order Comment: Name   
Collection Type:: Clean-Voided Midstream   
   
                                                            Performed By: #### U  
A, OBUDS ####  
[facility]  
[address] [place] [address] [place]   
   
                      Nitrite,Urine Negative   Normal     Negative   [facility]  
   
                                        Comment on above:   Order Comment: Name   
Collection Type:: Clean-Voided Midstream   
   
                                                            Performed By: #### U  
A, OBUDS ####  
[address]   
   
                      Occult Blood,Urine Negative   Normal     Negative   [facility]  
   
                                        Comment on above:   Order Comment: Name   
Collection Type:: Clean-Voided Midstream   
   
                                                            Result Comment: PERF  
ORMED BY:  
[place] [address]  
[phone]  
PATHOLOGIST MEDICAL DIRECTOR  
BASILIO CALI M.D.   
   
                                                            Performed By: #### U  
A, OBUDS ####  
[address]   
   
                      pH (U)     6.5 [pH]   Normal     5.0-9.0    [facility]  
   
                                        Comment on above:   Order Comment: Name   
Collection Type:: Clean-Voided Midstream   
   
                                                            Performed By: #### U  
A, OBUDS ####  
[address]   
   
                      Protein,Urine Negative   Normal     Negative   [facility]  
   
                                        Comment on above:   Order Comment: Name   
Collection Type:: Clean-Voided Midstream   
   
                                                            Performed By: #### U  
A, OBUDS ####  
[address]   
   
                                                    Specificy   
Gravity,Urine   1.008           Normal          1.001-1.030     [facility]  
   
                                        Comment on above:   Order Comment: Name   
Collection Type:: Clean-Voided Midstream   
   
                                                            Performed By: #### U  
A, OBUDS ####  
[place] [address] [place]   
   
                      Urobilinogen,Urine Normal     Normal     Normal     [facility]  
   
                                        Comment on above:   Order Comment: Name   
Collection Type:: Clean-Voided Midstream   
   
                                                            Performed By: #### U  
A, OBUDS ####  
[place] [address] [place]   
   
                                                    Urine clarity by refractomet  
ry automatedOrdered By: Juan Francisco Stoll on 2023   
   
                                                    Clarity Refractometry   
automated (U)   Clear                           Clear           [facility]  
   
                                                    Urine cocaine detectionOrder  
ed By: Juan Francisco Stoll on 2023   
   
                      Cocaine Ql (U) Negative              Negative   [facility]  
   
                                                    Urine glucose measurement by  
 automated test strip (mass/volume)Ordered By:   
Juan Francisco Stoll on 2023   
   
                                                    Glucose Auto test   
strip (U) [Mass/Vol] Normal mg/dL                    Normal          [facility]  
   
                                                    Urine hemoglobin detection b  
y automated test stripOrdered By: Juan Francisco Stoll on   
2023   
   
                                                    Hemoglobin Auto test   
strip Ql (U)    Negative                        Negative        [facility]  
   
                                                    Urine leukocyte esterase det  
ection by automated test stripOrdered By: Juan Francisco Stoll   
on 2023   
   
                                                    Leukocyte esterase   
Auto test strip Ql   
(U)             Negative                        Negative        [facility]  
   
                                                    Urobilinogen Auto test strip  
 (U) [Mass/Vol]Ordered By: Juan Francisco Stoll on   
2023   
   
                                                    Urobilinogen (U)   
[Mass/Vol]      Normal mg/dL                    Normal          [facility]  
   
                                                    pH Auto test strip (U)Ordere  
d By: Juan Francisco Stoll on 2023   
   
                      pH (U)     6.5 [pH]              5.0-9.0    [facility]  
   
                                                    OBSTETRIC ULTRASOUND WHIon 0  
2023   
   
                                                                  [facility]  
   
                                                    OBSTETRIC ULTRASOUND WHIon 1  
2022   
   
                                                                  [facility]  
   
                                                    ALLIED HEALTHon 11-   
   
                                        ALLIED HEALTH       HNO ID: [id number]  
Author: Rosaura Crump RDMS  
Service: Radiology  
Author Type: Cardiac   
Sonographer  
Type: Allied Health  
Filed: 11/15/2022 5:23   
PM  
Note Text:  
Radiology Service   
Progress Note  
PATIENT NAME: Shameka Figueredo  
MRN: [id number]  
DATE OF SERVICE:   
November 15, 2022  
TIME: 5:22 PM  
PATIENT IDENTITY   
VERIFICATION COMPLETED   
USING TWO (2)   
IDENTIFIERS: Name  
and Date of Birth   
confirmed by patient   
verbally and Name and   
Date of Birth  
confirmed by   
identification band.  
FALL SCREENING: Has   
the patient had 2   
falls in the last year   
or 1 fall  
with injury or   
currently using an   
Ambulatory Assistive   
Device (Walker,  
Cane, Wheelchair,   
Crutches, etc.)?   
Emergency Room   
Patient: Screened in   
ED  
PATIENT GENDER DATA:   
Female. Pregnancy   
status: Pregnant: Yes.  
Urinalysis hCG results   
are as follows:   
Positive Breastfeeding   
status:  
N/A  
PATIENT RELEVANT   
IMPLANT DATA REVIEWED:   
Not Applicable  
RADIOLOGY DEPARTMENT:   
Ultrasound  
PERIPHERAL IV DATA:   
Not applicable  
SIGNED BY: Rosaura Crump RDMS  
November 15, 2022 5:22   
PM                  [facility]  
   
                                                    C. trachomatis+N. gonorrhoea  
e DNA RUBEN+probe Ql (Unsp spec)on 11-   
   
                                                    C. trachomatis DNA   
RUBEN+probe Ql (Unsp   
spec)               Negative            Normal              Negative for   
Chlamydia   
trachomatis by   
amplificaton                            [facility]  
   
                                        Comment on above:   Order Comment: Speci  
men Type: SWAB  
Ordering Facility: [facility]  
Address: [address] [address]   
   
                                                            Performed By: #### 3  
6902-5 ####  
[facility]  
CLIA [id number]  
[facility] [address]   
   
                                                    N. gonorrhoeae DNA   
RUBEN+probe Ql (Unsp   
spec)               Negative            Normal              Negative for   
Neisseria   
gonorrhoeae by   
amplification                           [facility]  
   
                                        Comment on above:   Order Comment: Speci  
men Type: SWAB  
Ordering Facility: [facility]  
Address: [address] [address]   
   
                                                            Performed By: #### 3  
6902-5 ####  
[facility]  
CLIA [id number]  
[address] [place] [address]   
   
                                                    ED NOTEon 11-   
   
                                        ED NOTE             HNO ID: [id number]  
Author: Melida Willoughby RN  
Service: ?  
Author Type:   
Registered Nurse  
Type: ED Notes  
Filed: 11/15/2022 6:07   
PM  
Note Text:  
Pt to dc home.   
Discussed medications   
and f/u care. No   
questions at time of  
dc.                 [facility]  
   
                                        ED NOTE             HNO ID: [id number]  
Author: Geeta Cornelius PA-C  
Service: Emergency   
Medicine  
Author Type: Physician   
Assistant  
Type: ED Notes  
Filed: 2022 9:56   
AM  
Note Text:  
Emergency Services: ED   
Call Back   
Questionnaire  
SERVICE DATE:   
11/15/2022  
Are you feeling   
better? Yes, no   
concerns  
Any questions about   
discharge instructions   
and follow-up care?   
Yes, has  
question about   
medication which was   
addressed  
Were you able to make   
a follow up   
appointment? Yes  
Do you have any   
further questions? No  
Is there anything that   
we could have done   
differently to improve   
your ED  
visit? No  
SIGNATURE: Geeta Cornelius PA-C PATIENT   
NAME: Shameka Figueredo  
DATE: 2022 MRN: [id number]  
TIME: 9:54 AM       [facility]  
   
                                                    ED PROV NOTEon 11-   
   
                                        ED PROV NOTE        HNO ID: [id number]  
Author: Bill Pelayo PA-C  
Service: Emergency   
Medicine  
Author Type: Physician   
Assistant  
Type: ED Provider   
Notes  
Filed: 11/15/2022 7:50   
PM  
Note Text:  
ED Provider Note  
Patient Name: Shameka Figueredo  
MRN: [id number]  
: 2000  
SERVICE DATE: 11/15/22  
History  
Patient presents with:  
Vaginal Problem:   
Yellow/green discharge   
since yesterday,   
burning and  
itching. Denies   
concern for STI. 15   
weeks OB  
Patient is a   
22-year-old female   
 currently 15   
weeks pregnant   
presents  
to the ED for vaginal   
discharge and pelvic   
pain since yesterday.   
Patient  
reports yellow/green   
vaginal discharge. She   
reports vaginal   
itching and  
 burning . She denies   
any burning with   
urination, urinary  
urgency/frequency,   
hematuria, vaginal   
bleeding, nausea,   
vomiting,  
fever/chills, flank   
pain.  
Patient had confirmed   
IUP when she was 9   
weeks pregnant. She   
states that  
she is scheduled to   
see a maternal-fetal   
medicine physician on   
. She states she was   
told she does have a   
small subchorionic   
hematoma  
but denies any vaginal   
bleeding recently. She   
reports some mild   
lower  
pelvic cramping since   
the discharge started.   
Denies any injury or   
trauma.  
No dizziness,   
weakness, fatigue,   
headache, chest pain,   
shortness of  
breath, cough.  
PAST MEDICAL HISTORY  
Diagnosis Date  
Depression  
Gestational diabetes  
H/O pre-term labor  
Post partum depression  
PAST SURGICAL HISTORY  
Procedure Laterality   
Date  
CHOLECYSTECTOMY  
KNEE ARTHROSCOPY  
FAMILY HISTORY  
Problem Relation Age   
of Onset  
Hypertension Maternal   
Grandmother  
Hyperlipidemia   
Maternal Grandfather  
Hypertension Maternal   
Grandfather  
Heart Maternal   
Grandfather  
other (Lung Cancer)   
Maternal Grandfather  
Social History  
Tobacco Use  
Smoking status: Never  
Smokeless tobacco:   
Never  
Vaping Use  
Vaping Use: Never used  
Substance and Sexual   
Activity  
Alcohol use: Not   
Currently  
Drug use: Never  
Sexual activity: Yes  
Partners: Male  
ALLERGIES  
Allergen Reactions  
Hydrocodone-Acetami*   
Mental Status Change  
Review of Systems  
Constitutional:   
Negative for activity   
change, appetite   
change, chills,  
diaphoresis, fatigue,   
fever and unexpected   
weight change.  
HENT: Negative for   
congestion, ear   
discharge, ear pain,   
facial swelling,  
postnasal drip,   
rhinorrhea, sinus   
pressure, sinus pain,   
sore throat,  
trouble swallowing and   
voice change.  
Eyes: Negative for   
visual disturbance.  
Respiratory: Negative   
for apnea, cough,   
choking, chest   
tightness,  
shortness of breath,   
wheezing and stridor.  
Cardiovascular:   
Negative for chest   
pain, palpitations and   
leg swelling.  
Gastrointestinal:   
Negative for abdominal   
distention, abdominal   
pain,  
blood in stool,   
constipation,   
diarrhea, nausea and   
vomiting.  
Genitourinary:   
Positive for pelvic   
pain and vaginal   
discharge. Negative  
for decreased urine   
volume, difficulty   
urinating,   
dyspareunia, dysuria,  
flank pain, frequency,   
genital sores,   
hematuria, menstrual   
problem,  
urgency, vaginal   
bleeding and vaginal   
pain.  
Musculoskeletal:   
Negative for back   
pain, myalgias, neck   
pain and neck  
stiffness.  
Skin: Negative for   
rash.  
Allergic/Immunologic:   
Negative for   
immunocompromised   
state.  
Neurological: Negative   
for dizziness,   
syncope, speech   
difficulty,  
weakness,   
light-headedness,   
numbness and   
headaches.  
Hematological: Does   
not bruise/bleed   
easily.  
Psychiatric/Behavioral  
: Negative for   
confusion.  
Physical Exam  
Vitals  
BP Pulse Temp Temp src   
Resp SpO2 Weight   
Height  
11/15/22 1540 11/15/22   
1540 11/15/22 1540   
11/15/22 1540 11/15/22   
1540  
11/15/22 1540 11/15/22   
1539 11/15/22 1539  
123/55 80 36.7 ?C   
(98.1 ?F) Oral 14 100   
% 77.1 kg (170 lb)   
1.727 m (5' 8 )  
Physical Exam  
Vital signs reviewed.  
General Survey: Alert   
and oriented x 3.   
Sitting in bed in no   
acute  
distress.  
Skin: Warm and dry.   
Cap refill less than 2   
seconds. Nail beds   
pink.  
Head: Normocephalic,   
atraumatic.  
Eyes: PERRLA, EOMI,   
Conjunctivae normal.   
Sclera white.  
Mouth: Gums pink.   
Buccal mucosa pink and   
moist.  
Neck: Supple with no   
meningismus. Trachea   
midline. No carotid   
bruits  
bilaterally. No JVD.  
Respiratory: Lung   
sounds clear and equal   
to auscultation   
bilaterally. No  
wheezes, rhonci or   
rales. Chest expansion   
symmetrical. No   
accessory  
muscle usage.  
Cardiovascular: Normal   
S1 and S2. Heart RRR,   
no gallops or rubs, no   
aorta  
enlargement or bruit   
noted.  
Peripheral Vascular:   
Extremities warm and   
without edema.   
Carotid, radial  
pulses, and DP/PT   
pulses 2+ and equal   
bilaterally. No calf  
swelling/tenderness.  
Gastrointestinal: Soft   
and non-distended. No   
tenderness with   
palpation.  
No masses. Bowel   
sounds present in all   
four quads. No   
rebound, guarding,  
peritoneal signs, or   
masses. No tenderness   
at Mcburney's point.   
Negative  
miller's sign. No   
pulsatile midline   
mass.  
Genitourinary: No CVA   
tenderness.  
Pelvic Exam: +Troy,   
white vaginal   
discharge. No external   
lesions noted.  
Cervix appeared normal   
with no motion   
tenderness. No   
tenderness of  
adnexi. No vaginal   
bleeding.  
Musc (more content not   
included)...        Normal                                  [facility]  
   
                                                    Gram Stn Vagon 11-   
   
                                                    Microscopic   
observation Gram   
stain Nom (Vag fld)                     BACTERIAL VAGINOSIS:  
BACTERIAL VAGINOSIS   
RESULT: Stain results   
consistent with normal   
vaginal robinsno.  
No Yeast observed  
Few Polymorphonuclear   
leukocytes          Normal                                  [facility]  
   
                                        Comment on above:   Performed By: #### 1  
4361-0 ####[facility]   
LABCLIA [id number] [address] [place]   
   
                                                    T vaginalis Ag Genital Ql IA  
on 11-   
   
                                                    T. vaginalis Ag IA Ql   
(Genital specimen)                      TRICHOMONAS PREP   
RESULT:  
Negative for   
Trichomonas vaginalis   
antigen             Normal                                  [facility]  
   
                                        Comment on above:   Performed By: #### 6  
566-4 ####[facility]   
[id number] [place] [address] [place]   
[place]   
   
                                                    US PREG TRANSABD >14 WEEKS L  
TDon 11-   
   
                                                    US PREG TRANSABD >14   
WEEKS LTD                               * * *Final Report* * *  
DATE OF EXAM: Nov 15   
2022 5:30PM  
FV[facility] 1036 - US PREG   
TRANSABD >14 WEEKS LTD   
/ ACCESSION #   
[id number]  
PROCEDURE REASON:   
Pelvic pain, positive   
beta-HCG, gyn etiology   
suspected  
  
* * * * Physician   
Interpretation * * * *  
EXAMINATION: SECOND   
AND THIRD TRIMESTER   
PELVIC ULTRASOUND US   
PREG  
TRANSABD >14 WEEKS LTD  
CLINICAL HISTORY:   
Signs and Symptoms   
such as Vaginal   
bleeding / Pelvic pain  
Gestational Age:   
weeks, days by crown   
rump length.  
TECHNIQUE: Sonography   
of the pelvis was   
performed by   
transabdominal  
technique. Images were   
obtained and stored in   
a permanent archive.  
MQ: [id number]  
Note: A limited   
antepartum obstetrical   
ultrasound examination   
was  
performed for the   
purpose of determining   
management in the   
acute care  
setting. A diagnostic   
fetal survey was not   
performed. This exam   
is to  
determine fetal or   
embryonic cardiac   
activity, estimated   
gestational age,  
fetal position and   
placental location.   
This does not   
eliminate the need  
for a full fetal   
ultrasound when   
otherwise indicated.  
COMPARISON: None.  
RESULT:  
Gestation: Single   
present  
Position: Breech  
Cardiac activity: 154   
bpm  
Femur length: 1.42 cm  
Estimated gestational   
age: 14 weeks 1 days   
by Composite  
Placenta:  
Location: anterior  
Previa: Absent  
Other:  
Amniotic fluid: normal   
volume  
Right ovary:  
- Size : 3.1 x 2.3 x   
2.0 cm  
- Normal sonographic   
appearance with   
physiologic follicles.   
A  
right ovarian cyst is   
present measuring 2.1   
x 1.9 x 1.6 cm   
possibly  
related to a complex   
corpus cyst.  
Left ovary:  
- Size: 2.7 x 1.8 x   
1.8 cm  
- Normal sonographic   
appearance with   
physiologic follicles.  
Pelvis free fluid:   
None.  
None  
IMPRESSION:  
Single, live   
intrauterine pregnancy   
estimated 14 weeks and   
1 day  
gestational age by   
ultrasound   
measurements.  
: MILAN  
Transcribe Date/Time:   
Nov 15 2022 5:35P  
Dictated by : ARMAND ARCOS MD  
This examination was   
interpreted and the   
report reviewed and  
electronically signed   
by:  
ARMAND ARCOS MD on   
Nov 15 2022 5:40PM EST  
[id number] Normal                                  [facility]  
   
                                                    Urinalysis complete panel (U  
)on 11-   
   
                                                    Bacteria LM.HPF   
(Urine sed) [#/Area] Rare            Abnormal        None Seen       [facility]  
   
                                        Comment on above:   Order Comment: Speci  
men Type: URINE SPECIMEN  
Ordering Facility: [facility]  
Address: [address] [place] [address]   
   
                                                            Performed By: #### 2  
4356-8 ####  
[facility]  
CLIA [id number]  
[address] [place] [address] [place]   
   
                      Bilirubin Ql (U) Negative   Normal     Negative   [facility]  
   
                                        Comment on above:   Order Comment: Speci  
men Type: URINE SPECIMEN  
Ordering Facility: [facility]  
Address: [address] [place] [address]   
   
                                                            Performed By: #### 2  
4356-8 ####  
[facility]  
CLIA [id number]  
[address] [place] [address] [place]   
   
                      Clarity (Unsp spec) Clear      Normal     Clear      [facility]  
   
                                        Comment on above:   Order Comment: Speci  
men Type: URINE SPECIMEN  
Ordering Facility: [facility]  
Address: [address] [place] [address]   
   
                                                            Performed By: #### 2  
4356-8 ####  
[facility]  
CLIA [id number]  
[address] [place] [address] [place]   
   
                      Color (U)  Colorless  Normal     Yellow     [facility]  
   
                                        Comment on above:   Order Comment: Speci  
men Type: URINE SPECIMEN  
Ordering Facility: [facility]  
Address: [address] [place] [address]   
   
                                                            Performed By: #### 2  
4356-8 ####  
[facility]  
CLIA [id number]  
[address] [place] [address] [place]   
   
                                                    Epithelial cells   
LM.HPF (Urine sed)   
[#/Area]        Few             Normal                          [facility]  
   
                                        Comment on above:   Order Comment: Speci  
men Type: URINE SPECIMEN  
Ordering Facility: [facility]  
Address: [address] [place] [address]   
   
                                                            Performed By: #### 2  
4356-8 ####  
[facility]  
CLIA [id number]  
[address] [place] [address] [place]   
   
                                                    Glucose Test strip   
(U) [Mass/Vol]  Negative        Normal          Negative        [facility]  
   
                                        Comment on above:   Order Comment: Speci  
men Type: URINE SPECIMEN  
Ordering Facility: [facility]  
Address: [address] [place] [address]   
   
                                                            Performed By: #### 2  
4356-8 ####  
[facility]  
CLIA [id number]  
[address] [place] [address] [place]   
   
                      Hemoglobin Ql (U) Negative   Normal     Negative   [facility]  
   
                                        Comment on above:   Order Comment: Speci  
men Type: URINE SPECIMEN  
Ordering Facility: [facility]  
Address: [address] [place] [address]   
   
                                                            Performed By: #### 2  
4356-8 ####  
[facility]  
CLIA [id number]  
[address] [place] [address] [place]   
   
                      Ketones Ql (U) Negative   Normal     Negative   [facility]  
   
                                        Comment on above:   Order Comment: Speci  
men Type: URINE SPECIMEN  
Ordering Facility: [facility]  
Address: [address] [place] [address]   
   
                                                            Performed By: #### 2  
4356-8 ####  
[facility]  
CLIA [id number]  
[address] [place] [address] [place]   
   
                                                    Leukocyte esterase   
Test strip Ql (U) Negative        Normal          Negative        [facility]  
   
                                        Comment on above:   Order Comment: Speci  
men Type: URINE SPECIMEN  
Ordering Facility: [facility]  
Address: [address] [place] [address]   
   
                                                            Performed By: #### 2  
4356-8 ####  
[facility]  
CLIA [id number]  
[address] [place] [address] [place]   
   
                      Nitrite Ql (U) Negative   Normal     Negative   [facility]  
   
                                        Comment on above:   Order Comment: Speci  
men Type: URINE SPECIMEN  
Ordering Facility: [facility]  
Address: [address] [place] [address]   
   
                                                            Performed By: #### 2  
4356-8 ####  
[facility]  
CLIA [id number]  
[address] [place] [address] [place]   
   
                      pH (U)     6.5 [pH]   Normal     5.0-8.0    [facility]  
   
                                        Comment on above:   Order Comment: Speci  
men Type: URINE SPECIMEN  
Ordering Facility: [facility]  
Address: [address] [place] [address]   
   
                                                            Performed By: #### 2  
4356-8 ####  
[facility]  
CLIA [id number]  
[address] [place] [address] [place]   
   
                                                    Protein (U)   
[Mass/Vol]      Negative        Normal          Negative        [facility]  
   
                                        Comment on above:   Order Comment: Speci  
men Type: URINE SPECIMEN  
Ordering Facility: [facility]  
Address: [address] [place] [address]   
   
                                                            Performed By: #### 2  
4356-8 ####  
[facility]  
CLIA [id number]  
[address] [place] [address] [place]   
   
                                                    RBC LM.HPF (Urine   
sed) [#/Area]   0-3 /HPF        Normal          0-3 /HPF        [facility]  
   
                                        Comment on above:   Order Comment: Speci  
men Type: URINE SPECIMEN  
Ordering Facility: [facility]  
Address: [address] [place] [address]   
   
                                                            Performed By: #### 2  
4356-8 ####  
[facility]  
CLIA [id number]  
[address] [place] [address] [place]   
   
                                                    Specific gravity (U)   
[Rel density]   1.008           Normal          1.005-1.030     [facility]  
   
                                        Comment on above:   Order Comment: Speci  
men Type: URINE SPECIMEN  
Ordering Facility: [facility]  
Address: [address] [place] [address]   
   
                                                            Performed By: #### 2  
4356-8 ####  
[facility]  
CLIA [id number]  
[address] [place] [address] [place]   
   
                      Urobilinogen Ql (U) Negative   Normal     Negative   [facility]  
   
                                        Comment on above:   Order Comment: Speci  
men Type: URINE SPECIMEN  
Ordering Facility: [facility]  
Address: [address] [place] [address]   
   
                                                            Performed By: #### 2  
4356-8 ####  
[facility]  
CLIA [id number]  
[address] [place] [address] [place]   
   
                                                    WBC LM.HPF (Urine   
sed) [#/Area]   0-5 /HPF        Normal          0-5 /HPF        [facility]  
   
                                        Comment on above:   Order Comment: Speci  
men Type: URINE SPECIMEN  
Ordering Facility: [facility]  
Address: [address] [place] [address]   
   
                                                            Performed By: #### 2  
[id number] ####  
[facility]  
CLIA [id number]  
[address] [place] [address] [place]   
   
                                                    Bill 2022   
   
                                        CNPN                Telephone (OBGYF2)  
----------------------  
--------------  
SHAMEKA FIGUEREDO ([id number])   
2000 F  
Date Time Provider   
Department  
22 [facility]   
OBGYF2  
During your visit   
today, we recorded the   
following information   
about you:  
Carol Aparicio RN   
2022 9:32 AM   
Signed  
Informed pt that call   
was regarding   
scheduling anatomy US   
and consult as  
requested by Dr. Starr   
at [facility] for hx of   
PPROM/PTD.  
, hx of 3 MAB and   
1 vaginal PTD at 35   
weeks. Pt PPROM'd,   
reason unknown.  
She denies any other   
current medical   
conditions. Current   
meds: PNV, Zoloft,  
Abilify.  
Pt states she was   
denied for 17P   
injections, not   
covered by insurance.  
Offered anatomy US on   
 at 1pm and   
consult to follow. Pt   
accepts, front  
staff to schedule.   
Advised pt that she   
may bring two support   
persons and all  
must wear masks. Also   
instructed to drink 1   
bottle of water en   
route to appt.  
Pt agrees and   
verbalizes   
understanding and has   
no further questions   
at this  
time.  
Carol Aparicio RN  
[facility]  
Allergies As of Date:   
2022 Noted   
Allergy Reaction  
HYDROCODONE-ACETAMINOP  
HEN 2016 1 -   
Mental Status Change  
Date Reviewed:   
2022  
Reviewed by: Dayana Dempsey RN - Fully   
Assessed  
Reason for Visit:  
Appointment [186]  
Prescriptions as of   
2022  
- DULoxetine   
(CYMBALTA) 60 mg   
capsule  
Take 60 mg by mouth   
once daily.  
- blood sugar   
diagnostic (FREESTYLE   
LITE STRIPS) test   
strip  
TEST BLOOD SUGARS 2   
TIMES DAILY  
- lancets (FREESTYLE   
LANCETS) 28 gauge  
1 Each twice daily.  
- metFORMIN ER   
(GLUCOPHAGE XR) 750 mg   
24 hr tablet  
Take 1 tablet by mouth   
daily with breakfast.  
- ARIPiprazole   
(ABILIFY) 15 mg tablet  
Take 1 tablet by mouth   
once daily.  
- sertraline (ZOLOFT)   
100 mg tablet  
Take 1.5 tablets by   
mouth once daily.  
- traZODone (DESYREL)   
50 mg tablet  
Take 1 tablet by mouth   
daily at bedtime.  
- venlafaxine ER   
(EFFEXOR XR) 75 mg 24   
hr capsule  
Take 1 capsule by   
mouth once daily.  
- Cranberry 400 mg cap  
Take 1 capsule by   
mouth once daily.  
Problem List As Of   
Date 2022 Noted   
Resolved  
Diet controlled   
gestational diabetes   
mellitus (*2019   
12/10/2019  
Normal delivery [O80]   
2019  
Night sweats [R61]   
2019  
Diarrhea [R19.7]   
2019  
Headache [R51.9]   
2019  
Reactive hypoglycemia   
[E16.1] 12/10/2019  
History of gestational   
diabetes mellitus   
(GDM) *12/10/2019  
Encounter Number:   
[id number]  
Encounter   
Status:Closed by CAROL APARICIO RN on   
22            Normal                                  [facility]  
   
                                                    CBC AUTO DIFFon 10-   
   
                      BASO #     0.0 103/ul Normal     0.0-0.1    [facility]  
   
                                        Comment on above:   Performed By: #### C  
BC ####[facility] [address] [place], [place] [address]Dr. Chalino Winters   
   
                                                    Basophils/100 WBC   
(Bld)           0.4 %           Normal          0.2-2.0         [facility]  
   
                                        Comment on above:   Performed By: #### C  
BC ####[facility] [address] [place], [place] [address]Dr. Chalino Winters   
   
                      EO #       0.1 103/ul Normal     0.0-0.7    [facility]  
   
                                        Comment on above:   Performed By: #### C  
BC ####[facility] [address] [place], [place] [address]Dr. Chalino Winters   
   
                                                    Eosinophils/100 WBC   
(Bld)           0.8 %           Critically low  0.9-7.0         [facility]  
   
                                        Comment on above:   Performed By: #### C  
BC ####[facility] [address] [place], [place] [address]Dr. Chalino Winters   
   
                                                    Erythrocyte   
distribution width   
(RBC) [Ratio]   12.0 %          Normal          11.0-15.0       [facility]  
   
                                        Comment on above:   Performed By: #### C  
BC ####[facility] [address] [place], [place] [address]Dr. Chalino Winters   
   
                                                    Hematocrit (Bld)   
[Volume fraction] 35.5 %          Critically low  36.0-48.0       [facility]  
   
                                        Comment on above:   Performed By: #### C  
BC ####[facility] [address] [place], [place] [address]Dr. Chalino Winters   
   
                                                    Hemoglobin (Bld)   
[Mass/Vol]      12.4 g/dL       Normal          12.0-16.0       [facility]  
   
                                        Comment on above:   Performed By: #### C  
BC ####[facility] [address] [place], [place] [address]Dr. Chalino Winters   
   
                      IG #       0.02 10e3/ul Normal     0.00-0.03  [facility]  
   
                                        Comment on above:   Performed By: #### C  
BC ####[facility] [address]275   
[place] [id number]. Chalino Winters   
   
                      IG %       0.2 %      Normal     0.0-0.5    [facility]  
   
                                        Comment on above:   Performed By: #### C  
BC ####[facility] [address]qsvrvimqn5816   
[place] [id number]. Chalino Winters   
   
                      LYMPH #    1.3 103/ul Normal     1.2-3.8    [facility]  
   
                                        Comment on above:   Performed By: #### C  
BC ####[facility] Zhhhbzymlz9354   
[place] [id number]. Chalino Singh   
   
                                                    Lymphocytes/100 WBC   
(Bld)           15.5 %          Critically low  20.5-60.0       [facility]  
   
                                        Comment on above:   Performed By: #### C  
BC ####[facility] [address]ebksjdlxf3744   
[place] [address]Dr. Chalino Winters   
   
                      MANUAL DIFF REQ NO         Normal                [facility]  
   
                                        Comment on above:   Performed By: #### C  
BC ####[facility] [address]mwuqfz[address]896   
[place] [id number]. Chalino Winters   
   
                                                    MCH (RBC) [Entitic   
mass]           31.3 pg         Normal          26.7-34.0       [facility]  
   
                                        Comment on above:   Performed By: #### C  
BC ####[facility] Rknlodradg8269   
[place] [id number]. Chalino Winters   
   
                      MCHC (RBC) [Mass/Vol] 34.9 g/dL  Normal     29.9-35.2  [facility]  
 [facility]  
   
                                        Comment on above:   Performed By: #### C  
BC ####[facility] [address]obrbehdwj4001   
[place] [id number]. Chalino Winters   
   
                                                    MCV (RBC) [Entitic   
vol]            89.6 fL         Normal          81.0-99.0       [facility]  
   
                                        Comment on above:   Performed By: #### C  
BC ####[facility] [address] [place], [place] [id number]. Chalino Winters   
   
                      MONO #     0.5 103/ul Normal     0.3-0.8    [facility]  
   
                                        Comment on above:   Performed By: #### C  
BC ####[facility] Yjllfqasyp8959   
[place] [id number]. Chalino Winters   
   
                                                    Monocytes/100 WBC   
(Bld)           5.4 %           Normal          1.7-12.0        [facility]  
   
                                        Comment on above:   Performed By: #### C  
BC ####[facility] Fobpgyzzad1900   
[place] [id number]. Chalino Winters   
   
                      NEUT #     6.4 103/ul Normal     1.4-6.5    [facility]  
   
                                        Comment on above:   Performed By: #### C  
BC ####[facility] Kiuajjpaqf3563   
[place] [id number]. Chalino Winters   
   
                                                    Neutrophils/100 WBC   
(Bld)           77.7 %          Critically high 43.0-75.0       [facility]  
   
                                        Comment on above:   Performed By: #### C  
BC ####[facility] Ulvpeoyuea9911   
[place] [address]Dr. Chalino Winters   
   
                                                    Platelet mean volume   
(Bld) [Entitic vol] 9.6 fL          Normal          9.5-13.5        [facility]  
   
                                        Comment on above:   Performed By: #### C  
BC ####[facility] Llvbithwcd3290   
[place] [id number]. Chalino Winters   
   
                      PLT        227 103/ul Normal     150-450    [facility]  
   
                                        Comment on above:   Performed By: #### C  
BC ####[facility] Mubbwfzjot8044   
[place] [id number]. Chalino Winters   
   
                      RBC        3.96 106/ul Critically low 4.20-5.40  [facility]  
   
                                        Comment on above:   Performed By: #### C  
BC ####[facility] Hlopocmmvv6087   
[place] [id number]. Chalino Winters   
   
                      WBC        8.3 103/ul Normal     4.0-11.0   [facility]  
   
                                        Comment on above:   Performed By: #### C  
BC ####[facility] Peyywezlbh9065   
[place] [id number]. Chalino Winters   
   
                                                    ER URINE PROFILEon 10-  
2   
   
                      Bilirubin Ql (U) Negative   Normal     NEGATIVE   [facility]  
   
                                        Comment on above:   Performed By: #### E  
RUR ####  
[facility]  
[address] [place] [address]  
Dr. Chalino Winters   
   
                      Clarity (U) CLEAR      Normal     CLEAR      [facility]  
   
                                        Comment on above:   Performed By: #### E  
RUR ####  
[facility]  
[address] [place] [address]  
Dr. Chalino Winters   
   
                      Color (U)  YELLOW     Normal     YELLOW     [facility]  
   
                                        Comment on above:   Performed By: #### E  
RUR ####  
[facility]  
[address] [place] [address]  
Dr. Chalino RODAS              A micrscopic   
examination will be   
performed if   
indicated.          Normal                                  [facility]  
   
                                        Comment on above:   Performed By: #### E  
RUR ####  
[facility]  
[address] [place] [address]  
Dr. Chalino Winters   
   
                      Glucose Ql (U) Negative   Normal     NEGATIVE   [facility]  
   
                                        Comment on above:   Performed By: #### E  
RUR ####  
[facility]  
[address] [place] [address]  
Dr. Chalino Winters   
   
                      Hemoglobin Ql (U) Negative   Normal     NEGATIVE   [facility]  
   
                                        Comment on above:   Performed By: #### E  
RUR ####  
[facility]  
[address] [place] [address]  
Dr. Chalino Winters   
   
                      Ketones Ql (U) TRACE      Abnormal   NEGATIVE   [facility]  
   
                                        Comment on above:   Performed By: #### E  
RUR ####  
[facility]  
[address] [place] [address]  
Dr. Chalino Winters   
   
                      LEUKOCYTES Negative   Normal     NEGATIVE   [facility]  
   
                                        Comment on above:   Performed By: #### E  
RUR ####  
[facility]  
[address] [place] [address]  
Dr. Chalino Winters   
   
                      Nitrite Ql (U) Negative   Normal     NEGATIVE   [facility]  
   
                                        Comment on above:   Performed By: #### E  
RUR ####  
[facility]  
[address] [place] [address]  
Dr. Chalino Winters   
   
                      pH (U)     6.0 [pH]   Normal     5-9        [facility]  
   
                                        Comment on above:   Performed By: #### E  
RUR ####  
[facility]  
[address] [place] [address]  
Dr. Chalino Winters   
   
                      SPEC GRAVITY >=1.030    Abnormal   1.005-<=1.025 [facility]  
   
                                        Comment on above:   Performed By: #### E  
RUR ####  
[facility]  
[address] [place] [address]  
Dr. Chalino Winters   
   
                          UA PROTEIN   Negative     Normal       NEGATIVE/   
TRACE                                   [facility]  
   
                                        Comment on above:   Performed By: #### E  
RUR ####  
[facility]  
[address] [place] [address]  
Dr. Chalino Winters   
   
                      UR MICRO IND NOT INDICATED Normal                [facility]  
   
                                        Comment on above:   Performed By: #### E  
RUR ####  
[facility]  
[address] [place] [address]  
Dr. Chalino Winters   
   
                      Urobilinogen Qn (U) 1.0 {Aneta'U}/dL Normal     0.2 - 1.  
0  [facility]  
   
                                        Comment on above:   Performed By: #### E  
RUR ####  
[facility]  
[address] [place] [address]  
Dr. Chalino Winters   
   
                                                    PROF CHEM 8 (BAS METB)on 10-  
   
   
                      Anion gap [Moles/Vol] 8.4 mmol/L Normal                [facility]  
   
                                        Comment on above:   Performed By: #### B  
MP ####  
[facility]  
[address] [place] [address]  
Dr. Chalino Winters   
   
                      Calcium [Mass/Vol] 9.0 mg/dL  Normal     8.5-10.1   [facility]  
   
                                        Comment on above:   Performed By: #### B  
MP ####  
[facility]  
[address] [place] [address]  
Dr. Chalino Winters   
   
                      Chloride [Moles/Vol] 103 mmol/L Normal          The   
[facility]  
   
                                        Comment on above:   Performed By: #### B  
MP ####  
[facility]  
[address] [place] [address]  
Dr. Chalino Winters   
   
                      CO2 [Moles/Vol] 28.1 mmol/L Normal     21.0-32.0  [facility]  
   
                                        Comment on above:   Performed By: #### B  
MP ####  
[facility]  
[address] [place] [address]  
Dr. Chalino Winters   
   
                      Creatinine [Mass/Vol] 0.65 mg/dL Normal     0.55-1.02  [facility]  
   
                                        Comment on above:   Performed By: #### B  
MP ####  
[facility]  
[address] [place] [address]  
Dr. Chalino Winters   
   
                      EGFR-AF AMERICAN >60        Normal     >=60       [facility]  
   
                                        Comment on above:   Performed By: #### B  
MP ####  
[facility]  
[address] [place] [id number]  
Dr. Chalino Winters   
   
                      EGFR-NON AF AMERICAN >60        Normal     >=60       [facility]  
   
                                        Comment on above:   Performed By: #### B  
MP ####  
[facility]  
[address] [place] [address]  
Dr. Chalino Winters   
   
                      Glucose [Mass/Vol] 80 mg/dL   Normal          [facility]  
   
                                        Comment on above:   Performed By: #### B  
MP ####  
[facility]  
[address] [place] [address]  
Dr. Chalino Winters   
   
                      Potassium [Moles/Vol] 3.5 mmol/L Normal     3.5-5.1    [facility]  
   
                                        Comment on above:   Performed By: #### B  
MP ####  
[facility]  
[address] [place] [address]  
Dr. Chalino Winters   
   
                      Sodium [Moles/Vol] 136 mmol/L Normal     136-145    [facility]  
   
                                        Comment on above:   Performed By: #### B  
MP ####  
[facility]  
[address] [place] [address]  
Dr. Chalino Winters   
   
                                                    Urea nitrogen   
[Mass/Vol]      7.0 mg/dL       Normal          7.0-18.0        [facility]  
   
                                        Comment on above:   Performed By: #### B  
MP ####  
[facility]  
[address] [place] [address]  
Dr. Chalino Winters   
   
                                                    Urea   
nitrogen/Creatinine   
[Mass ratio]    10.8 mg/mg      Normal                          [facility]  
   
                                        Comment on above:   Performed By: #### B  
MP ####  
[facility]  
[address] [place] [id number]  
Dr. Chalino Winters   
   
                                                    Discharge Planning Gbys2dd 0  
2022   
   
                                                    Discharge Planning   
Note2                                   Discharge Planning:  
Anticipated Discharge   
[id number]  
  
Discharge Planning  
22  
Pt plans to return   
home with her    
and follow up with Dr. Rodriguez tomorrow  
and Family Health,   
thereafter. She   
accepted information   
on [facility]  
[employer] and coping   
with work stress.   
ADRIANE Sumner  
  
Assessment:  
Discharge Planning   
Assessment   
[id number]  
Lives Withspouse;   
dependent child(seema);   
son 3(1)  
Living   
Arrangementshouse(1)  
Stated Reason for   
AdmissionDepression;   
Agitation &   
Aggression, Panic  
Attacks(2)  
Arrived Fromemergency   
department (2)  
Resource/Environmental   
Concernsnone(2)  
[place]  
[facility]  
  
Discharge   
Documentation:  
Discharge/Transfer   
Date/Time19-Sep-2022   
14:39  
Discharged Accompanied   
Byparent  
Discharge   
Modeambulatory  
Transportation   
Methodprivate car  
Code StatusCode Status   
order at time of   
discharge: Full Code  
[place] DNR Form Sent   
with Patient and/or   
Familyno  
Valuables/Medications/  
Belongings Returnedyes  
Final Disposition.Home  
  
  
  
Electronic Signatures:  
Gaby Chen   
(BOB) (Signed   
19-Sep-2022 13:04)  
Authored: Discharge   
Planning, Assessment,   
Discharge   
Documentation  
Renetta Gallardo (ROBSON)   
(Signed 19-Sep-2022   
14:46)  
Authored: Discharge   
Planning, Discharge   
Documentation  
  
  
Last Updated:   
19-Sep-2022 14:46 by   
Renetta Gallardo (RN)  
  
References:  
1. Data Referenced   
From  Psychiatric   
Assessment - Social   
Work-Inpatient   
19-Sep-2022 07:52  
2. Data Referenced   
From  Patient Profile   
- Adult v2    
17-Sep-2022 02:07   Normal                                  [facility]  
   
                                                    Daily Progress Note - Psychi  
atryon 2022   
   
                                                    Daily Progress Note -   
Psychiatry                              Subjective Data:  
SHAMEKA FIGUEREDO is a 22   
year old Female who is   
Hospital Day # 3.  
  
Additional   
Information:  
Barbara describes her   
mood as somewhat   
better. She had a good   
visit with her  
family yesterday. She   
is tolerating Abilify   
Zoloft well.   
Discussion again  
done about risk of   
untreated depression   
and mood swings on   
pregnancy fetus and  
maternal health, with   
a history of severe   
postpartum depression   
3 years ago  
with her first   
pregnancy importance   
of managing and   
maintaining mood   
assessment  
for need for   
medications and other   
important discussion   
of whether to taper   
off  
during third trimester   
or not and importance   
of restarting   
treatment postpartum  
safety planning and   
also breast-feeding   
and lactation were all   
discussed.  
Patient currently is   
planning to continue   
with this treatment as   
she herself  
along with her family   
notes positive   
influence on her mood   
we will keep working  
with her gynecologist   
on the outpatient   
basis and keeping   
close watch on fetal  
health, patient is   
tolerating the shah   
milieu well.  
Patient is attending   
all the groups and   
meetings and finds   
them useful in  
developing more   
understanding of their   
symptoms and   
developing coping   
skills.  
Patient is tolerating   
medications well.  
Labs Reviewed.  
Vitals Reviewed.  
Nursing Notes   
Reviewed.  
No EPS, TD, vitals   
stable.  
  
MSE: Patient was   
alert, oriented to   
time, place, person   
and situation. Patient  
appears well groomed   
and clad in climate   
appropriate clothes.   
Patient is  
resigned and guarded   
on approach. Recent   
and remote memory   
within normal  
limits. Memory   
registration and   
recall within normal   
limits. Attention and  
concentration within   
normal limits. Speech   
normal in rate, rhythm   
and volume.  
Good eye contact.   
Though process Linear.   
Intact associations.   
Good fund of  
knowledge. Mood sad   
and affect   
constricted. Patient   
did not endorse any  
delusions. Patient   
denied any auditory   
visual hallucinations.   
Patient has  
denied any active   
suicidal ideations and   
is not future   
oriented. Patient has  
fair insight, fair   
judgment and good   
impulse control.  
  
Musculoskeletal:   
Normal gait, no   
Parkinsonism, no   
Dystonia, no   
Akathisia, no  
TD. Psychomotor   
activity within normal   
limits.  
  
Diagnosis: Major   
depression severe with   
mixed features  
  
Assessment and Plan:  
  
Mood: Continue Abilify   
5 mg once a day along   
with Zoloft 50 mg at   
bedtime  
Possible discharge   
tomorrow plan for   
inpatient psychiatric   
care: Continue with  
psychiatric care in   
[facility].  
  
Continue with current   
treatment and   
medication   
adjustments. Patient   
is  
agreeable with   
continued medication   
management and   
adjustments discussed.  
  
  
Assessment and Plan:  
Risk Assessment:  
[place] Suicide Risk:   
low (1)  
Acute Risk of Harm to   
Self is Considered:   
low (2)  
DASA Risk for   
Violence: (0-1) Low   
Risk for violence in   
next 24 hours(1)  
Acute Risk of Harm to   
Others is Considered:   
low (2)  
  
  
Electronic Signatures:  
Saul Rodriguez)   
(Signed 18-Sep-2022   
11:12)  
Authored: Subjective   
Data, Assessment and   
Plan, Note Completion  
  
  
Last Updated:   
18-Sep-2022 11:12 by   
Saul Rodriguez)  
  
References:  
1. Data Referenced   
From  4. A + I -   
Behavioral    
18-Sep-2022 10:01  
2. Data Referenced   
From  History and   
Physical - Psychiatry    
17-Sep-2022 13:12   Normal                                  [facility]  
   
                                                    Discharge [id number]   
022   
   
                                        Discharge Profile2  Discharge Orders:  
Anticipated Discharge   
Date:  
Anticipated Discharge   
[id number]  
  
  
DNAR:  
Code Status at   
Discharge: Full Code  
  
Psychiatric Continuing   
Care Plan:  
Tobacco Use:   
Screening: Was the   
patient screened   
within the first 3   
days of  
admission for tobacco   
use (cigarettes,   
smokeless tobacco,   
pipe, and cigar)  
within the previous 30   
days: yes; NOT tobacco   
user  
This patient is being   
discharged on multiple   
antipsychotic   
medications: no:  
Take all medications   
until outpatient   
provider advises   
otherwise.  
  
Advance Directives:  
Advance Directive   
(Medical)no(1)  
Advance Directive   
Information   
Givenpatient/family   
declined (1)  
Reason No Advance   
Directive (Medical)did   
not wish to discuss   
adv  
directive/surrogate(1)  
Advance Directive   
(Mental Health)no (1)  
Advance Directive   
Information Given   
(Mental   
Health)patient/family  
declined(1)  
Reason No Advance   
Directive (Mental   
Health)did not wish to   
discuss adv  
directive/surrogate(1)  
  
Transition Record:  
Transition Record   
Discussed: All 11   
elements of this   
patients transition  
record were discussed   
with the   
patient/caregiver and   
the Next Level of Care  
Provider  
Transition Record   
Given: A copy of the   
transition record was   
given to the  
patient and was   
transmitted to the   
Next Level of Care   
Provider  
  
Provider FINAL REVIEW   
of Orders:  
Final Review:  
Final Review of   
Medication   
Reconciliation and   
Orders Completedby   
SARMAD Madera at   
19-Sep-2022 12:45:39  
  
Appointments:  
Follow-Up Appointment   
01:  
Physician/Dept/Service  
  
Reason for   
ReferralMental health   
follow up  
Scheduled   
Date/Time20-Sep-2022   
01:00  
[id number], [place]  
Phone   
[phone]  
[facility]  
  
Follow-Up Appointment   
02:  
Physician/Dept/Service  
[facility] services  
Reason for   
Referralmental health   
follow up  
[id number] [facility] [address]  
Phone   
[phone]  
  
  
Electronic Signatures:  
Lia Mays (N   
MGR) (Signed   
19-Sep-2022 13:27)  
Authored: Discharge   
Orders, Appointments  
Curry Guerrero   
(DENVER-DEB) (Signed   
19-Sep-2022 12:45)  
Authored: Psychiatric   
Continuing Care Plan,   
Provider FINAL REVIEW   
of Orders  
Renetta Gallardo (RN)   
(Signed 19-Sep-2022   
13:33)  
Authored: Discharge   
Orders, Psychiatric   
Continuing Care Plan,   
Appointments,  
Gold Form - Social   
Services Summary  
  
  
Last Updated:   
19-Sep-2022 13:33 by   
Renetta Gallardo (RN)  
  
References:  
1. Data Referenced   
From  Admission Risk   
Screen - Adult    
17-Sep-2022 02:11   Normal                                  [facility]  
   
                                                    Admission Risk Screen - Adul  
ton 2022   
   
                                                    Admission Risk Screen   
- Adult                                 Allergies:  
Allergies:  
No Known Allergies:  
  
Patient Verification:  
New W ID Band Applied   
in my Departmentno  
Type of ID Patient is   
WearingW wristband,   
but not applied here  
Patient Transferred   
from Other  Facility   
([facility], [facility]   
[facility],etc)no  
Patient Identity   
Verified Bypatient  
ID Band FULL Name,   
include Middle,   
spelling matches   
patient's ID used for  
verificationyes  
ID Band  Matches   
Patient ID used for   
Verficationyes  
ID Band MRN Matches   
EMR MRNyes  
  
Visitor Restriction:  
Coronavirus Visitor   
Restriction:   
Reasonable   
restrictions to   
in-person  
visitors will be   
observed due to   
current coronavirus   
pandemic.  
  
Travel History:  
COVID-19 Screening   
Completedno exposure   
or symptoms(1)  
Travel or Exposure   
Past 30 DaysNO travel   
to International   
locations in the  
past 30 days  
Ebola AlertFor   
Ebola-like Symptoms:   
Isolate Patient and   
Notify  
Provider/Infection   
Preventionist  
  
For Contact: Notify   
Provider/Infection   
Preventionist  
  
[place] Fall Screen:  
History of falling   
(immediate or   
previous)no (0)  
Secondary Diagnosisyes   
(15)  
Intravenous Therapy/   
Heparin/Saline Lockno   
(0)  
Gait/Transferringnorma  
l/bedrest/wheelchair   
(0)  
Ambulatory   
Aidsnone/bedrest/nurse   
assist (0)  
Mental Statusoriented   
to own ability (0)  
Score: Low risk (<25).   
Moderate risk (25-44).   
High risk (>44).15  
Loweyr InterventionsLOW   
INTERVENTIONS:  
*patient oriented to   
surroundings and call   
system,  
* patient/family falls   
education completed  
and documented,  
*patients fall status   
communicated  
during bedside   
handoff,  
*whiteboard updated,  
*mode of toileting   
discussed with   
patient,  
*bed in low position   
with brakes locked,  
*call light in reach,  
* non-skid footwear  
  
  
Functional Screen:  
Functional Screen: In   
the recent/past 2-4   
weeks, patient or   
family have  
noticedno issues that   
require a   
speech/language   
consult at this time  
  
AM-PAC- Basic   
Mobility/Daily   
Activity:  
Patient baseline   
bedboundno  
  
Learning Assessment   
(Patient):  
Patient is Able to be   
Assessed for   
Learningyes  
Factors Influencing   
Readiness to   
Learnacuteness of   
illness; anxiety;  
depression  
Factors that Impact   
Ability to Learnnone  
Devices/Methods Used   
to Communicatenone  
Learning   
Preferencesverbal   
instruction; written   
material;   
computer/internet  
Cultural   
Considerationsnone  
Developmental   
Considerationsnone  
Anglican   
Considerationsnone  
  
Learning Assessment   
(Other Learner):  
Other learner   
availableno  
  
Adult Nutrition   
Screen:  
Have you recently lost   
weight without   
tryingno  
Have you been eating   
poorly because of a   
decreased appetiteno  
Malnutrition Screening   
Tool Score0  
Malnutrition Screening   
Tool RiskMST = 0 or 1   
Not at risk. Eating   
well with  
little or no weight   
loss  
Nutrition Consult   
needed this visitno  
Can Patient   
Participate in Room   
Serviceyes  
Patient requires Paper   
Dishes/Plastic   
Utensilsno  
  
Pain Screen:  
Pain Scalenumerical   
0-10  
Pain Scale   
Educationteaching   
provided  
Current Pain Level0 =   
None  
Acceptable Pain Level0   
= None  
Expression of Pain   
(nonverbal)none  
Chronic Painno  
  
Spiritual Screen:  
Are there any   
cultural, spiritual,   
Gnosticism   
practices/values/needs   
that are  
important for us to   
knowno  
  
Vaccinations:  
Vaccination -   
Influenza Vaccination   
Screen:  
Is it flu season   
(between  and   
)Yes  
Screening for   
identified   
contraindications to   
influenza vaccination  
patient/caregiver   
refusal  
  
Vaccination -   
Pneumonia Vaccination   
Screen:  
Patient has received a   
previous pneumonia   
vaccine:no/unknown...  
Immunocompetent   
persons with   
underlying chronic   
conditions or reside   
in long  
term care   
facilitiesnone of   
these conditions  
Persons with   
Functional or Anatomic   
Asplenianone of these   
conditions  
Immunocompromised   
Personsnone of these   
conditions  
Pneumonia vaccine NOT   
indicated due   
to:patient DOES NOT   
have a condition  
that indicates   
vaccination  
  
Andrés:  
Skin - Andrés Scale:  
  
Andrés: Sensory   
Perception (response   
to environment)(4) no   
impairment  
Andrés: Moisture   
(degree skin exposed   
to moisture)(4) rarely   
moist  
Andrés: Activity   
(ability to walk)(4)   
walks frequently  
Andrés: Mobility   
(amount/control of   
body movement)(4) no   
limitation  
Andrés: Nutrition   
(quality of food   
intake)(4) excellent  
Andrés: Friction and   
Shear(3) no apparent   
problem  
Andrés: Score23  
  
Significant   
Indicatiors:  
Significant   
Indicators: Complete  
  
Pressure Injury:  
Pressure Injury   
Present on Admissionno  
  
  
  
Advance Directives:  
Advance Directive   
(Medical)no  
Advance Directive   
Information   
Givenpatient/family   
declined  
Reason No Advance   
Directive (Medical)did   
not wish to discuss   
adv  
directive/surrogate  
Advance Directive   
(Mental Health)no  
Advance Directive   
Information Given   
(Mental   
Health)patient/family   
declined  
Reason No Advance   
Directive (Mental   
Health)did not wish to   
discuss adv  
directive/surrogate  
  
Strengths (document at   
least 2 ):  
Describe Your   
Strengths:  I'm a good   
mom.   
Describe Your   
Strengths 2:  I'm a   
friendly person.   
  
Safety Wanding:  
W (more content not   
included)...        Normal                                  [facility]  
   
                                                    CORONAVIRUS 2019 BY PCRon    
   
                                                    SARS-CoV-2 (COVID-19)   
RNA RUBEN+probe Ql   
(Unsp spec)     Canceled        Normal                          [facility]  
   
                                        Comment on above:   Order Comment: TEST   
CORONAVIRUS 2019 BY PCR WAS CANCELLED,   
2022 13:06 CANCEL PER RN 5W.   
   
                                                            Result Comment: .  
This test has received FDA Emergency Use Authorization (EUA) and   
has been  
verified by [facility]. This   
test is only  
authorized for the duration of time that circumstances exist to   
justify the  
authorization of the emergency use of in vitro diagnostic tests   
for the  
detection of SARS-CoV-2 virus and/or diagnosis of COVID-19   
infection under  
section 564(b)(1) of the Act, 21 U.S.C. 360bbb-3(b)(1), unless   
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   
centimeter(s)(2)  
Height in feet5 feet  
Height in inches7.99   
inch(es)  
Height Methodstated  
BMI (kg/m2)25.846   
square meter  
  
RSP Based Care:  
How would you like to   
participate in your   
care I find therapy   
helpful.   
What is the number one   
concern for you during   
this   
hospitalization Gettin  
g  
myself better so I can   
feel normal again.   
What is the most   
important thing we can   
do to support you   
during this  
hospitalization I need   
to talk to the   
psychiatrist about my   
medications.   
Is there anything we   
need to know to best   
care for you No.   
  
Substance:  
Smoking Statuslight   
user (uses <10   
cig/day, OR <0.5 ppd,   
OR 1 can/pouch loose  
leaf tobacco per week,   
OR <0.5 vape pods per   
day) (3)  
Tobacco Cessation   
Education (provide if   
tobacco use within the   
last 12 mos)  
patient declined  
Alcohol   
Useoccasionally(3)  
Drug Usedenies (3)  
Drug 2 Usedenies (3)  
  
Health Mgmt:  
Symptoms/Conditions   
Managed at   
Homebehavioral health  
Are You Pregnantyes   
(1)  
Are You Currently   
Breastfeedingno (1)  
Behavioral Health   
Symptoms/Conditionsanx  
iety; depression  
Behavioral Management   
Strategiescounseling;   
medication therapy  
Behavioral Health   
Managementmanaged  
Current Pregnancy   
Management   
Strategiescounseling;   
medication therapy  
  
Relationship/Environ:  
Resource/Environmental   
Concernsnone  
Primary Source of   
Support/Comfortparent;   
child(seema); spouse  
Lives Withdependent   
child(semea); spouse  
[place]  
Services Anticipated   
at Transitionmental   
health services  
Anticipated Transition   
[place]  
Significant   
IndicatorsComplete  
  
  
  
Information Review:  
Allergies, Home Meds   
and Significant Events   
have been Reviewed and   
Verified  
with Patient/Familyyes  
  
ALLERGY, INTOLERANCE,   
ADVERSE EVENT:  
Allergies:  
No Known Allergies:   
Active  
  
  
Electronic Signatures:  
Yogesh Barrera (RN)   
(Signed 17-Sep-2022   
02:11)  
Authored: Initial   
Info, General Health,   
RSP Based Care,   
Substance, Health  
Mgmt,   
Relationship/Environ,   
Additional Information  
  
  
Last Updated:   
17-Sep-2022 02:11 by   
Yogesh Barrera (RN)  
  
References:  
1. Data Referenced   
From  Triage - ED    
16-Sep-2022 19:01  
2. Data Referenced   
From  1. Vital Signs    
16-Sep-2022 19:01  
3. Data Referenced   
From  Provider Note -   
ED v3  16-Sep-2022   
22:51               Normal                                  [facility]  
   
                                                    Provider Note - ED v3on    
   
                                        Provider Note - ED v3 Provider Note:  
Chart Review:  
ED NOTES  
ED NOTES:  
A female patient with   
history of anxiety and   
depression comes in   
the emergency  
department today at   
the request of her   
psychiatrist Dr. Rodriguez.   
She states  
recently she has been   
having very violent   
outbursts where she   
has become  
physical with those   
around her. Patient   
states she has passive   
thoughts of  
things it would be   
easier if she just was   
not around but has no   
specific plan  
to harm her self nor   
does she think she   
would. Patient denies   
any homicidal  
ideations, auditory   
visual hallucinations.   
She states her doctor   
said to come  
in and be admitted as   
he will be able to   
adjust her medications   
as an inpatient  
better. Patient admits   
that she is roughly 6   
and half weeks   
pregnant. Denies  
any abdominal pain or   
vaginal bleeding. She   
is G4, P1.  
  
HISTORY OF PRESENTING   
ILLNESS  
SHAMEKA is a 22 year old   
Female and was seen by   
me at 16-Sep-2022   
19:10 for a  
chief complaint of   
psychiatric evaluation   
( My psychiatrist sent   
to ER to be  
admitted. I have just   
been more physical   
with people and   
depressed. having  
bursts of rage.  6.5   
weeks pregnant   
too)(1). The historian   
is the patient.  
  
Triage Information:   
Most recent Vital Sign   
Value Date  
Temp (F): 97.3   
2022 19:01  
Temp (C): 36.3   
2022 19:01  
Heart Rate   
(beats/min): 89   
2022 19:01  
Respirations   
(breaths/min): 16   
2022 19:01  
SpO2 (%): 99   
2022 19:01  
BP Systolic (mm Hg):   
134 2022 19:01  
BP Diastolic (mm Hg):   
66 2022 19:01  
  
  
Presenting Symptoms:   
depression.Context is   
Unknown.Timing is   
intermittent.  
  
PAST MEDICAL HISTORY  
ATTESTATION: Medical   
conditions:   
Depression, anxiety  
  
Social history: Not   
incarcerated  
  
PSYCHOSOCIAL   
SCREENING: NO:   
concerns for safety at   
home, feelings of  
depression, feels like   
hurting others and   
feels like hurting   
self  
  
CURRENT OR FORMER   
SUBSTANCE USE:  
Tobacco/Nicotine Use:   
light user (uses <10   
cig/day, OR <0.5 ppd,   
OR 1 can/pouch  
loose leaf tobacco per   
week, OR <0.5 vape   
pods per day)  
Alcohol Use:   
occasionally  
Drug Use: denies,Drug   
2 Use: denies  
ALLERGIES/INTOLERANCES  
: No Known Allergies  
  
HEALTH HISTORY: No   
documented data.  
  
OUTPATIENT   
MEDICATIONS: Home   
Medications Review   
Status for   
Reconciliation:  
Incomplete  
Med Status: Patient   
Currently Takes   
Medications  
  
Drug Name:   
cholecalciferol 400   
intl units (10 mcg)   
oral capsule  
Instructions: 1 cap(s)   
orally once a day  
  
Drug Name: Prenatal   
Complete with DHA oral   
capsule  
Instructions: 3 cap(s)   
orally once a day  
  
Drug Name:   
ARIPiprazole 5 mg oral   
tablet  
Instructions: 1 tab(s)   
orally once a day  
  
Drug Name: sertraline   
50 mg oral tablet  
Instructions: 1 tab(s)   
orally once a day  
  
SIGNIFICANT EVENTS: No   
documented data.  
  
  
  
REVIEW OF SYSTEMS  
CONSTITUTIONAL:   
Negative for: chills   
and fever  
RESPIRATORY: Negative   
for: cough and dyspnea  
GASTROINTESTINAL:   
Negative for:   
abdominal pain,   
diarrhea, nausea and   
vomiting;  
PSYCHIATRIC: POSITIVE   
for: depression and   
mood swings All other   
systems  
reviewed and are   
negative  
  
  
PHYSICAL EXAM  
CONSTITUTIONAL: Well   
appearing, well   
nourished, awake,   
alert, oriented to  
person, place,   
time/situation and in   
no apparent distress.   
EYES: Clear  
bilaterally, pupils   
equal, round and   
reactive to light.   
CARDIOVASCULAR: Normal  
rate, regular rhythm.   
Heart sounds S1, S2.   
No murmurs, rubs or   
gallops. PMI  
non-displaced.   
RESPIRATORY: Breath   
sounds clear and equal   
bilaterally.  
GASTROINTESTINAL:   
Abdomen soft,   
non-distended, no   
rebound, no guarding.   
Bowel  
sounds normal in all 4   
quadrants.   
MUSCULOSKELETAL: Spine   
appears normal, range  
of motion is not   
limited, no muscle or   
joint tenderness.   
NEUROLOGICAL: Alert  
and oriented, no focal   
deficits, no motor or   
sensory deficits.   
PSYCHIATRIC:  
Alert and oriented to   
person, place,   
time/situation. normal   
mood and affect. No  
apparent risk to self   
or others.  
  
CRITICAL CARE  
VITAL SIGNS:  
  
*Vital Signs have not   
been recorded in the   
last 5 hours  
  
  
  
MDM  
MDM/ED COURSE:  
Medically cleared for   
EPAT evaluation  
  
Patient was evaluated   
by EPAT. I did speak   
to Dr. Saunders who is a   
colleague of  
Dr. Rodriguez who agrees to   
admit the patient.  
  
  
  
  
  
PROGRESS NOTE  
EKG  
  
Procedure Location:   
bedside  
Pre-procedure   
Verification: deferred   
due to emergent   
procedure  
Time Out - Final   
Verification: deferred   
due to emergent   
procedure  
Post-Procedure   
Diagnosis:  
  
EKG INTERPRETATION:  
EKG Date/Time:   
16-Sep-2022 19:55  
Rate: 90  
Rhythm: NSR  
STEMI: no  
Axis: Normal  
ST Segment/T Wave: T   
inversion (T wave   
inversions in lead   
aVR, V1, 3)  
  
  
DISPOSITION  
Diagnosis/Annotation:   
ED Dx  
Name:Mood swings  
Code:R45.86  
  
Name:Violent behavior  
Code:R45.6  
  
Disposition:   
hospitalized  
Admit to: Behavioral   
Health. Admitting   
Considerations:  
  
  
  
CONSULT  
Attestation: This is a   
shared visit. I have   
reviewed the LIPs   
encounter note,  
approve the LIPs   
documentation and   
provide the following   
additional  
information from my   
personal encounter.  
Shared (more content   
not included)...    Normal                                  [facility]  
   
                                                    TSHon 2022   
   
                      TSH Qn     2.06 m[IU]/L Normal     0.44 - 3.98 [facility]  
   
                                        Comment on above:   Result Comment: TSH   
testing is performed using different   
testing  
methodology at [facility] than at other  
[facility]. Direct result comparisons should  
only be made within the same method.   
   
                                                            Performed By: #### T  
SH2 ####  
[address] [id number]   
   
                      TSH        Canceled   Normal                [facility]  
   
                                        Comment on above:   Order Comment: TEST   
TSH WAS CANCELLED, 2022 11:15 add on  
   
11:15 2022.   
   
                                                            Result Comment: TSH   
testing is performed using different testing  
methodology at [facility] than at [facility]. Direct result comparisons should  
only be made within the same method.   
   
                                                            Performed By: #### H  
CGQU ####  
[address] [id number]   
   
                                                    VITAMIN D, 25-HYDROXYon    
   
                      VITAMIN D, 25-HYDROXY 29 ng/mL   Abnormal              [facility]  
   
                                        Comment on above:   Result Comment: .  
DEFICIENCY: < 20 NG/ML  
INSUFFICIENCY: 20-29 NG/ML  
SUFFICIENCY:  NG/ML  
THIS ASSAY ACCURATELY QUANTIFIES THE SUM OF  
VITAMIN D3, 25-HYDROXY AND VIT D2,25-HYDROXY.   
   
                                                            Performed By: #### C  
OV19 ####  
[address] [id number]   
   
                      VITAMIN D, 25-HYDROXY Canceled   Normal                [facility]  
   
                                        Comment on above:   Order Comment: TEST   
VITAMIN D, 25-HYDROXY WAS CANCELLED,   
2022 11:15 add on 11:15  
2022.   
   
                                                            Performed By: #### V  
TDOH ####  
[address] [id number]   
   
                                                    ACETAMINOPHENon 2022   
   
                                                    Acetaminophen   
[Mass/Vol]      ug/mL           Normal          10.0 - 30.0     [facility]  
   
                                        Comment on above:   Performed By: #### C  
OV19 ####  
[address] [id number]   
   
                                                    ALCOHOLon 2022   
   
                      Ethanol [Mass/Vol] mg/dL      Normal                [facility]  
   
                                        Comment on above:   Result Comment: FOR   
MEDICAL USE ONLY.  
.  
REF VALUES <10   
   
                                                            Performed By: #### S  
ALIC ####  
[address] [id number]   
   
                                                    CBC AND DIFFERENTIALon    
   
                                                    % AUTOMATED IMMATURE   
GRAN            0.3 %           Normal          0.0 - 0.9       [facility]  
   
                                        Comment on above:   Result Comment: Karissa  
ture Granulocyte Count (IG) includes   
promyelocytes,  
myelocytes and metamyelocytes but does not include bands.  
Percent differential counts (%) should be interpreted in the  
context of the absolute cell counts (cells/L).   
   
                                                            Performed By: #### C  
BCDF ####  
[address] [id number]   
   
                                                    Basophils (Bld)   
[#/Vol]         0.05 10*3/uL    Normal          0.00 - 0.10     [facility] [facility]  
   
                                        Comment on above:   Performed By: #### C  
BCDF ####  
[address] [id number]   
   
                                                    Basophils/100 WBC   
(Bld)           0.7 %           Normal          0.0 - 2.0       [facility]  
   
                                        Comment on above:   Performed By: #### C  
BCDF ####  
[address] [id number]   
   
                                                    Eosinophils (Bld)   
[#/Vol]         0.14 10*3/uL    Normal          0.00 - 0.70     [facility]  
   
                                        Comment on above:   Performed By: #### C  
BCDF ####  
[address] [id number]   
   
                                                    Eosinophils/100 WBC   
(Bld)           2.1 %           Normal          0.0 - 6.0       [facility]  
   
                                        Comment on above:   Performed By: #### C  
BCDF ####  
[address] [id number]   
   
                                                    Erythrocyte   
distribution width   
(RBC) [Ratio]   11.9 %          Normal          11.5 - 14.5     [facility]  
   
                                        Comment on above:   Performed By: #### C  
BCDF ####  
[address] [id number]   
   
                                                    Hematocrit (Bld)   
[Volume fraction] 34.1 %          Low             36.0 - 46.0     [facility]  
   
                                        Comment on above:   Performed By: #### C  
BCDF ####  
[address] [id number]   
   
                                                    Hemoglobin (Bld)   
[Mass/Vol]      11.6 g/dL       Low             12.0 - 16.0     [facility]  
   
                                        Comment on above:   Performed By: #### C  
BCDF ####  
[address] [id number]   
   
                                                    Lymphocytes (Bld)   
[#/Vol]         1.68 10*3/uL    Normal          1.20 - 4.80     [facility]  
   
                                        Comment on above:   Performed By: #### C  
BCDF ####  
[address] [id number]   
   
                                                    Lymphocytes/100 WBC   
(Bld)           24.9 %          Normal          13.0 - 44.0     [facility]  
   
                                        Comment on above:   Performed By: #### C  
BCDF ####  
[address] [id number]   
   
                      MCHC (RBC) [Mass/Vol] 34.0 g/dL  Normal     32.0 - 36.0 [facility]  
   
                                        Comment on above:   Performed By: #### C  
BCDF ####  
[address] [id number]   
   
                                                    MCV (RBC) [Entitic   
vol]            92 fL           Normal          80 - 100        [facility]  
   
                                        Comment on above:   Performed By: #### C  
BCDF ####  
[address] [id number]   
   
                                                    Monocytes (Bld)   
[#/Vol]         0.52 10*3/uL    Normal          0.10 - 1.00     [facility]  
   
                                        Comment on above:   Performed By: #### C  
BCDF ####  
[address] [id number]   
   
                                                    Monocytes/100 WBC   
(Bld)           7.7 %           Normal          2.0 - 10.0      [facility]  
   
                                        Comment on above:   Performed By: #### C  
BCDF ####  
[address] [id number]   
   
                                                    Neutrophils (Bld)   
[#/Vol]         4.33 10*3/uL    Normal          1.20 - 7.70     [facility]  
   
                                        Comment on above:   Performed By: #### C  
BCDF ####  
[address] [id number]   
   
                                                    Neutrophils/100 WBC   
(Bld)           64.3 %          Normal          40.0 - 80.0     [facility]  
   
                                        Comment on above:   Performed By: #### C  
BCDF ####  
[address] [id number]   
   
                                                    Platelets (Bld)   
[#/Vol]         212 10*3/uL     Normal          150 - 450       [facility]  
   
                                        Comment on above:   Performed By: #### C  
BCDF ####  
[address] [id number]   
   
                      RBC        3.72 x10E12/L Low        4.00 - 5.20 [facility]  
   
                                        Comment on above:   Performed By: #### C  
BCDF ####  
[address] [id number]   
   
                      WBC (Bld) [#/Vol] 6.7 10*3/uL Normal     4.4 - 11.3 [facility]  
   
                                        Comment on above:   Performed By: #### C  
BCDF ####  
[address] [id number]   
   
                                                    COMPREHENSIVE PANELon 2022   
   
                      Albumin [Mass/Vol] 4.2 g/dL   Normal     3.4 - 5.0  [facility]  
   
                                        Comment on above:   Performed By: #### C  
OV19 ####  
[address] [id number]   
   
                                                    ALP [Catalytic   
activity/Vol]   63 U/L          Normal          33 - 110        [facility]  
   
                                        Comment on above:   Performed By: #### C  
OV19 ####  
[address] [id number]   
   
                                                    ALT [Catalytic   
activity/Vol]   13 U/L          Normal          7 - 45          [facility]  
   
                                        Comment on above:   Result Comment: Malka  
ents treated with Sulfasalazine may   
generate  
falsely decreased results for ALT.   
   
                                                            Performed By: #### C  
OV19 ####  
[address] [id number]   
   
                      Anion gap [Moles/Vol] 11 mmol/L  Normal     10 - 20    [facility]  
   
                                        Comment on above:   Performed By: #### C  
OV19 ####  
[address] [id number]   
   
                                                    AST [Catalytic   
activity/Vol]   16 U/L          Normal          9 - 39          [facility]  
   
                                        Comment on above:   Performed By: #### C  
OV19 ####  
[address] [id number]   
   
                      Bilirubin [Mass/Vol] 0.4 mg/dL  Normal     0.0 - 1.2  [facility]  
   
                                        Comment on above:   Performed By: #### C  
OV19 ####  
[address] [id number]   
   
                      Calcium [Mass/Vol] 8.9 mg/dL  Normal     8.6 - 10.3 [facility]  
   
                                        Comment on above:   Performed By: #### C  
OV19 ####  
[address] [id number]   
   
                      Chloride [Moles/Vol] 106 mmol/L Normal     98 - 107   [facility]  
   
                                        Comment on above:   Performed By: #### C  
OV19 ####  
[address] [id number]   
   
                      Creatinine [Mass/Vol] 0.77 mg/dL Normal     0.50 - 1.05 [facility]  
   
                                        Comment on above:   Performed By: #### C  
OV19 ####  
[address] [id number]   
   
                      eGFR FEMALE >90        Normal     >90        [facility]  
   
                                        Comment on above:   Result Comment: CALC  
ULATIONS OF ESTIMATED GFR ARE PERFORMED  
USING THE  CKD-EPI STUDY REFIT EQUATION  
WITHOUT THE RACE VARIABLE FOR THE IDMS-TRACEABLE  
CREATININE METHODS.  
https://jasn.asnjournals.org/content/early//ASN.489704  
0988   
   
                                                            Performed By: #### C  
OV19 ####  
[address] [id number]   
   
                      Glucose [Mass/Vol] 99 mg/dL   Normal     74 - 99    [facility]  
   
                                        Comment on above:   Performed By: #### C  
OV19 ####  
[address] [id number]   
   
                                                    HCO3 (Bld)   
[Moles/Vol]     25 mmol/L       Normal          21 - 32         [facility]  
   
                                        Comment on above:   Performed By: #### C  
OV19 ####  
[address] [id number]   
   
                      Potassium [Moles/Vol] 3.4 mmol/L Low        3.5 - 5.3  [facility]  
   
                                        Comment on above:   Performed By: #### C  
OV19 ####  
[address] [id number]   
   
                      Protein [Mass/Vol] 7.2 g/dL   Normal     6.4 - 8.2  [facility]  
   
                                        Comment on above:   Performed By: #### C  
OV19 ####  
[address] [id number]   
   
                      Sodium [Moles/Vol] 139 mmol/L Normal     136 - 145  [facility]  
   
                                        Comment on above:   Performed By: #### C  
OV19 ####  
[address] [id number]   
   
                                                    Urea nitrogen   
[Mass/Vol]      9 mg/dL         Normal          6 - 23          [facility]  
   
                                        Comment on above:   Performed By: #### C  
OV19 ####  
[address] [id number]   
   
                                                    CORONAVIRUS 2019 BY PCRon    
   
                      Lab Specimen Source Nasal, Nasopharyngeal Normal            
      [facility]  
   
                                        Comment on above:   Order Comment: TEST   
CORONAVIRUS 2019 BY PCR WAS CANCELLED,   
2022 13:06 CANCEL PER RN 5W.   
   
                                                            Performed By: #### C  
OV19 ####  
[address] [id number]   
   
                                                    CREATINE KINASEon 2022  
   
   
                                                    CK [Catalytic   
activity/Vol]   79 U/L          Normal          0 - 215         [facility]  
   
                                        Comment on above:   Performed By: #### C  
K ####  
[address] [id number]   
   
                                                    DRUG SCREEN,URINEon 20  
22   
   
                      AMPHETAMINE SCREEN,U Negative   Normal     NEGATIVE   [facility]  
   
                                        Comment on above:   Result Comment: CUTO  
FF LEVEL: 500 NG/ML  
Cross-reactivity has been reported with high concentrations  
of the following drugs: buproprion, chloroquine, chlorpromazine,  
ephedrine, mephentermine, fenfluramine, phentermine,  
phenylpropanolamine, pseudoephedrine, and propranolol.   
   
                                                            Performed By: #### D  
RUG3 ####  
[address] [id number]   
   
                      BARBITURATES SCREEN,U Negative   Normal     NEGATIVE   [facility]  
   
                                        Comment on above:   Result Comment: CUTO  
FF LEVEL: 200 NG/ML   
   
                                                            Performed By: #### D  
RUG3 ####  
[address] [id number]   
   
                                                    BENZODIAZEPINES   
SCREEN,U        Negative        Normal          NEGATIVE        [facility]  
   
                                        Comment on above:   Result Comment: CUTO  
FF LEVEL: 200 NG/ML   
   
                                                            Performed By: #### D  
RUG3 ####  
[address] [id number]   
   
                      CANNABINOIDS SCREEN,U Negative   Normal     NEGATIVE   [facility]  
   
                                        Comment on above:   Result Comment: CUTO  
FF LEVEL: 50 NG/ML   
   
                                                            Performed By: #### D  
RUG3 ####  
[address] [id number]   
   
                                                    COCAINE METABOLITE   
SCREEN,U        Negative        Normal          NEGATIVE        [facility]  
   
                                        Comment on above:   Result Comment: CUTO  
FF LEVEL: 150 NG/ML   
   
                                                            Performed By: #### D  
RUG3 ####  
[address] [id number]   
   
                      DRUG SCREEN COMMENT SEE BELOW  Normal                [facility]  
   
                                        Comment on above:   Result Comment: Drug  
 screen results are presumptive and should  
  
not be used to assess  
compliance with prescribed medication. Contact the performing   
[facility]  
laboratory to add-on definitive confirmatory testing if   
clinically  
indicated.  
.  
Toxicology screening results are reported qualitatively. The   
concentration  
must be greater than or equal to the cutoff to be reported as   
positive. The  
concentration at which the screening test can detect an   
individual drug or  
metabolite varies. The absence of expected drug(s) and/or drug   
metabolite(s)  
may indicate non-compliance, inappropriate timing of specimen   
collection  
relative to drug administration, poor drug absorption,   
diluted/adulterated  
urine, or limitations of testing. For medical purposes only; not   
valid for  
forensic use.  
.  
Interpretive questions should be directed to the laboratory   
medical  
directors.   
   
                                                            Performed By: #### D  
RUG3 ####  
[address] [id number]   
   
                      FENTANYL SCREEN,URINE Negative   Normal     NEGATIVE   [facility]  
   
                                        Comment on above:   Result Comment: CUTO  
FF LEVEL: 5 NG/ML   
   
                                                            Performed By: #### D  
RUG3 ####  
[address] [id number]   
   
                      METHADONE SCREEN,U Negative   Normal     NEGATIVE   [facility]  
   
                                        Comment on above:   Result Comment: CUTO  
FF LEVEL: 150 NG/ML  
The metabolite L-alpha-acetylmethadol (LAAM) is not  
detected by this method in concentrations that would  
be found in the urine of patients on LAAM therapy.   
   
                                                            Performed By: #### D  
RUG3 ####  
[address] [id number]   
   
                      OPIATES SCREEN,U Negative   Normal     NEGATIVE   [facility]  
   
                                        Comment on above:   Result Comment: CUTO  
FF LEVEL: 300 NG/ML  
The opiate screen does not detect fentanyl, meperidine, or  
tramadol. Oxycodone is not consistently detected (refer to  
Oxycodone Screen, Urine result).   
   
                                                            Performed By: #### D  
RUG3 ####  
[address] [id number]   
   
                      OXYCODONE SCREEN,U Negative   Normal     NEGATIVE   [facility]  
   
                                        Comment on above:   Result Comment: CUTO  
FF LEVEL: 100 NG/ML  
This test will accurately detect both oxycodone and oxymorphone.   
   
                                                            Performed By: #### D  
RUG3 ####  
[address] [id number]   
   
                      PCP SCREEN,U Negative   Normal     NEGATIVE   [facility]  
   
                                        Comment on above:   Result Comment: CUTO  
FF LEVEL: 25 NG/ML  
Cross-reactivity has been reported with dextromethorphan.   
   
                                                            Performed By: #### D  
RUG3 ####  
[address] [id number]   
   
                                                    HCG,BETA-QUANTITATIVEon    
   
                      HCG,BETA-QUANTITATIVE 32440 mIU/mL Abnormal              [facility]  
[facility]  
   
                                        Comment on above:   Result Comment: Low-  
level positive HCG results can be seen in   
early pregnancy, in  
johnny- or post-menopausal females due to normal pituitary HCG  
production, or with analytic interference. Repeat testing in   
48-72  
hours can aid in assessing for pregnancy as results should   
double  
in this time period. FSH measurement is recommended in johnny- or  
post-menopausal females as concurrent elevation of FSH can   
support  
pituitary production as the source of the HCG elevation.  
.  
Total HCG measurement is performed using the [employer]   
Access  
Immunoassay which detects intact HCG and free beta HCG subunit.  
This test is not indicated for use as a tumor marker.  
HCG testing is performed using a different test methodology at   
[facility] than other [facility] hospitals. Direct result   
comparison  
should only be made within the same method.  
REF VALUES  
NONPREGNANT FEMALE <5  
MALES <5   
   
                                                            Performed By: #### H  
CGQU ####  
[address] [id number]   
   
                                                    Risk Screen - Adult Emergenc  
yon 2022   
   
                                                    Risk Screen - Adult   
Emergency                               Preferred Language:  
Preferred Language:  
Preferred Language for   
Discussing Health Care   
(patient/designee)Engl  
robert  
  
Advanced Directives:  
Advance   
Directive/DNRno  
  
Family Violence Adult:  
Abuse Screen:  
Are you or have you   
been threatened or   
abused physically,   
emotionally, or  
sexually by anyoneno  
  
Learning Assessment   
(Patient):  
Learning Assessment   
(Patient):  
Patient is Able to be   
Assessed for   
Learningyes  
Factors Influencing   
Readiness to   
Learninformation   
requested; interest in  
learning; pain  
Factors that Impact   
Ability to Learnnone  
Devices/Methods Used   
to Communicatenone  
Learning   
Preferencesindividual   
instruction; skill   
demonstration; verbal  
instruction; written   
material  
Cultural   
Considerationsnone  
Developmental   
Considerationsnone  
Anglican   
Considerationsnone  
Other Learnersfamily  
  
Learning Assessment   
(Other Learner):  
Learning Assessment   
(Other Learner):  
Other learner   
availableno  
  
Pressure   
Injury/TB/Substance:  
Pressure Injury:  
Do you have a coughno  
Smoking Statusnever   
smoker  
Alcohol Usedenies  
Drug Usedenies  
Drug 2 Usedenies  
  
  
Admission Risk Screen:  
Significant   
IndicatorsComplete  
  
CAGE:  
CAGE:  
Is this an injured   
patient at a Trauma   
Center   
([facility]/[place]/[facility]/[place]): no  
  
  
Electronic Signatures:  
Leslie Bowers (STAFF N)   
(Signed 16-Sep-2022   
19:00)  
Authored: Preferred   
Language, Advanced   
Directives, Family   
Violence Adult,  
Learning Assessment   
(Patient), Learning   
Assessment (Other   
Learner), Pressure  
Injury/TB/Substance,   
Pressure Injury, CAGE  
  
  
Last Updated:   
16-Sep-2022 19:00 by   
Leslie Bowers (STAFF N) Normal                                  [facility]  
   
                                                    SALICYLATEon 2022   
   
                      SALICYLATE <3         Normal     4 - 20     [facility]  
   
                                        Comment on above:   Performed By: #### S  
ALIC ####  
[address] [id number]   
   
                                                    Triage - EDon 2022   
   
                                        Triage - ED         Quick Triage:  
Are You Pregnantyes  
Have You Given Birth   
In The Last 6 Weeksno  
Are You Currently   
Breastfeedingno  
The patient and/or   
guardian verbally   
acknowledges placement   
for services into  
the following (when   
Urgent Care Service   
hours are   
operating):emergency  
department  
  
Chart Review:  
PRIMARY ASSESSMENT  
  
ABCD Normal Findings:   
airway open and   
patent, breathing   
normal, circulation  
normal and alert and   
oriented  
  
  
ARRIVAL INFORMATION  
  
Means of Arrival:   
Ambulatory Mode of   
Arrival: private   
vehicle Arrival From:  
home Accompanied By:   
self  
Language:  
Spoken Language   
Preferred: English   
Reading Language   
Preferred: English  
 Requested:   
no  was   
requested  
MDRO:  
History of MDRO: no  
Present on Arrival:  
Pressure Ulcer Present   
on Arrival to ED: no  
  
  
CHIEF COMPLAINT  
  
SHAMEKA FIGUEREDO is a Female   
patient with a chief   
complaint of   
psychiatric evaluation  
( My psychiatrist sent   
to ER to be admitted.   
I have just been more   
physical  
with people and   
depressed. having   
bursts of rage.  6.5   
weeks pregnant too).  
Triage Date/Time:   
16-Sep-2022 19:01  
MANJEET: 3  
Pain Rating (0-10): 0   
= None  
Vital Signs:  
Temperature: 97.3F (   
36.3C) taken temporal  
Blood Pressure: 134/66   
Mean:  
Heart Rate: 89  
Respiratory Rate: 16  
Pulse Oximetry: 99% on   
room air, no   
respiratory support.   
Height: 5 feet 8.00  
inches. 172.7 CM  
Weight: 169.7 pounds.   
Calculated 77.0 kg.   
(stated)  
Calculated BMI   
(kg/m2): 25.817   
Calculated BSA (m2)   
1.92  
  
[place] Coma Scale:  
Best Eye Response:   
(E4) spontaneous  
Best Motor Response:   
(M6) obeys commands  
Best Verbal Response:   
(V5) oriented  
[place] Score: 15  
  
  
Cough lasting greater   
than 3 weeks: no  
Patient   
immunocompromised   
related to: N/A  
Allergies: no  
Patient has homicidal   
thoughts: no  
  
  
  
  
Risk Screens  
Suicide Risk Screen  
  
In the Past Month:   
Have you wished you   
were dead or wished   
you could go to  
sleep and not wake up   
yes  
In the Past Month:   
Have you had any   
actual thoughts of   
killing yourself no  
In Your Lifetime: Have   
you ever done   
anything, started to   
do anything, or  
prepared to do   
anything to end your   
life no  
  
Suicide Risk   
Interventions  
Low Suicide Risk   
Interventions:   
consider behavioral   
health resources will   
be  
given at dischargeicon   
low  
  
  
[place] Fall Scale   
Screening  
Has the patient fallen   
before (or is the   
patient in the ED as a   
result of a  
fall) has not had a   
fall  
Does the patient have   
an impaired gait does   
not have impaired gait  
Is the patient   
cognitively impaired   
not cognitively   
impaired  
  
  
Interventions:  
Lowery Fall   
Interventions: LOW   
INTERVENTIONS:  
*patient oriented to   
surroundings and call   
system,  
* patient/family falls   
education completed  
and documented,  
*patients fall status   
communicated  
during bedside   
handoff,  
*whiteboard updated,  
*mode of toileting   
discussed with   
patient,  
*bed in low position   
with brakes locked,  
*call light in reach,  
* non-skid footwear  
  
  
PAST MEDICAL HISTORY  
  
Immunization History:  
Last Known Tetanus   
Immunization: Greater   
than 10 years  
  
  
  
TRAVEL HISTORY  
Travel History  
Coronavirus Screening:   
no exposure or   
symptoms  
Travel Exposure   
History: NO travel to   
International   
locations in the past   
30  
days  
  
  
PAIN  
  
Pain Scale Used: ROSA  
Pain Rating (0-10): 0   
= None  
Pain Management   
Interventions:   
relaxation, quiet   
environment   
facilitated and  
positioning  
  
  
  
  
  
Past Medical History:  
Past Medical History   
Reviewedyes  
  
  
Electronic Signatures:  
Leslie Bowers (STAFF N)   
(Signed 16-Sep-2022   
19:06)  
Entered: Risk Screens,   
Pain, Arrival, ABCD,   
Immunizations, Travel   
History,  
Chart Review, Past   
Medical History  
Authored: Quick   
Triage, Risk Screens,   
Pain, Arrival, ABCD,   
Immunizations,  
Travel History, Chart   
Review, Past Medical   
History  
  
  
Last Updated:   
16-Sep-2022 19:06 by   
Leslie Bowers (STAFF N) Normal                                  [facility]  
   
                                                    URINALYSISon 2022   
   
                      Appearance (U) CLEAR      Normal     CLEAR      [facility]  
   
                                        Comment on above:   Performed By: #### S  
ALIC ####  
[address] [id number]   
   
                      Bilirubin Ql (U) Negative   Normal     NEGATIVE   [facility]  
   
                                        Comment on above:   Performed By: #### S  
ALIC ####  
[address] [id number]   
   
                      Color (U)  YELLOW     Normal     STRAW,YELLOW [facility]  
   
                                        Comment on above:   Performed By: #### S  
ALIC ####  
[address] [id number]   
   
                      Glucose Ql (U) Negative   Normal     NEGATIVE   [facility]  
   
                                        Comment on above:   Performed By: #### S  
ALIC ####  
[address] [id number]   
   
                      Hemoglobin Ql (U) Negative   Normal     NEGATIVE   [facility]  
   
                                        Comment on above:   Performed By: #### S  
ALIC ####  
[address] [id number]   
   
                      Ketones Ql (U) 5 (TRACE)  Abnormal   NEGATIVE   [facility]  
   
                                        Comment on above:   Performed By: #### S  
ALIC ####  
[address] [id number]   
   
                                                    Leukocyte esterase   
Test strip Ql (U) Negative        Normal          NEGATIVE        [facility]  
   
                                        Comment on above:   Performed By: #### S  
ALIC ####  
[address] [id number]   
   
                      Nitrite Ql (U) Negative   Normal     NEGATIVE   [facility]  
   
                                        Comment on above:   Performed By: #### S  
ALIC ####  
[address] [id number]   
   
                      pH (U)     6.0 [pH]   Normal     5.0 - 8.0  [facility]  
   
                                        Comment on above:   Performed By: #### S  
ALIC ####  
[address] [id number]   
   
                      Protein Ql (U) Negative   Normal     NEGATIVE   [facility]  
   
                                        Comment on above:   Performed By: #### S  
ALIC ####  
[address] [id number]   
   
                                                    Specific gravity (U)   
[Rel density]   1.021           Normal          1.005 - 1.035   [facility]  
   
                                        Comment on above:   Performed By: #### S  
ALIC ####  
[address] [id number]   
   
                                                    Urobilinogen (U)   
[Mass/Vol]      2.0 mg/dL       High            0.0 - 1.9       [facility]  
   
                                        Comment on above:   Result Comment: Due   
to a manufacturing issue, low positive   
urobilinogen results may be  
falsely positive. Correlate with urine bilirubin and additional  
clinical/laboratory findings to assess the risk of hemolytic   
anemia or liver  
disease. If clinically indicated, repeat testing with an   
alternate method is  
available by contacting the laboratory within 24 hours.  
.  
Some pigments and medications may cause a false positive   
urobilinogen.   
   
                                                            Performed By: #### S  
[facility] ####  
[address] [id number]   
   
                                                    Automated erythrocytes count  
 in urine sediment (number/area)Ordered By: Dwain Guzman   
on 2022   
   
                                                    RBC Auto (Urine sed)   
[#/Area]        3-4 [HPF]                       0-4             [facility]  
   
                                                    Automated leukocytes count i  
n urine sediment (number/area)Ordered By: Dwain Guzman   
on 2022   
   
                                                    WBC Auto (Urine sed)   
[#/Area]        0-1 [HPF]                       0-4             [facility]  
   
                                                    Basophils Auto (Bld) [#/Vol]  
Ordered By: Dwain Guzman on 2022   
   
                                                    Basophils (Bld)   
[#/Vol]         0.1 10*3/uL                     0.0-0.2         [facility]  
   
                                                    Basophils/100 WBC Auto (Bld)  
Ordered By: Dwain Guzman on 2022   
   
                                                    Basophils/100 WBC   
(Bld)           0.9 %                           .               [facility]  
   
                                                    Bilirubin Test strip Ql (U)O  
rdered By: Dwain Guzman on 2022   
   
                      Bilirubin Ql (U) Negative              Negative   [facility]  
   
                                                    Blood hemoglobin measurement  
 (mass/volume)Ordered By: Dwain Guzman on   
2022   
   
                                                    Hemoglobin (Bld)   
[Mass/Vol]      12.5 g/dL                       11.8-15.4       [facility]  
   
                                                    Blood leukocytes automated c  
ount (number/volume)Ordered By: Dwain Guzman on   
2022   
   
                      WBC (Bld) [#/Vol] 6.2 10*3/uL            4.5-11.0   [facility]  
   
                                                    Body fluid albumin measureme  
nt (mass/volume)Ordered By: Dwain Guzman on   
2022   
   
                                                    Albumin (Body fld)   
[Mass/Vol]      3.9 g/dL                        3.2-5.5         [facility]  
   
                                                    Color Auto (U)Ordered By: Gavin Guzman on 2022   
   
                      Color (U)  Yellow                Yellow     [facility]  
   
                                                    Creatinine and Glomerular fi  
ltration rate.predicted panel (S/P/Bld)Ordered By:   
Dwain Guzman on 2022   
   
                      Creatinine [Mass/Vol] 0.72 mg/dL            0.44-1.03  [facility]  
   
                                                    Eosinophils Auto (Bld) [#/Vo  
l]Ordered By: Dwain Guzman on 2022   
   
                                                    Eosinophils (Bld)   
[#/Vol]         0.1 10*3/uL                     0.0-0.45        [facility]  
   
                                                    Eosinophils/100 WBC Auto (Bl  
d)Ordered By: Dwain Guzman on 2022   
   
                                                    Eosinophils/100 WBC   
(Bld)           1.9 %                           .               [facility]  
   
                                                    Erythrocyte distribution wid  
th Auto (RBC) [Ratio]Ordered By: Dwain Guzman on   
2022   
   
                                                    Erythrocyte   
distribution width   
(RBC) [Ratio]   13.0 %                          11.9-15.3       [facility]  
   
                                                    Estimated glomerular filtrat  
ion rate (GFR) non- AmericanOrdered By:   
Dwain Guzman on 2022   
   
                                                    GFR/1.73 sq   
M.predicted among   
non-blacks MDRD   
(S/P/Bld) [Vol   
rate/Area]      > 60 mL/Min                                     [facility]  
   
                                                    Globulin Calc (S) [Mass/Vol]  
Ordered By: Dwain Guzman on 2022   
   
                                                    Globulin (S)   
[Mass/Vol]      2.8 g/dL                                        [facility]  
   
                                                    Hematocrit Auto (Bld) [Volum  
e fraction]Ordered By: Dwain Guzman on 2022   
   
                                                    Hematocrit (Bld)   
[Volume fraction] 36.4 %                          34.0-46.4       [facility]  
   
                                                    Ketones Auto test strip (U)   
[Mass/Vol]Ordered By: Dwain Guzman on 2022   
   
                                                    Ketones (U)   
[Mass/Vol]      Negative                        Negative        [facility]  
   
                                                    Laboratory - Hematology and   
Cell countsOrdered By: Dwain Guzman on 2022   
   
                                                    Nucleated RBC/100 WBC   
(Bld) [Ratio]   0.1 %                           0-0.5           [facility]  
   
                                                    Laboratory - UrinalysisOrder  
ed By: Dwain Guzman on 2022   
   
                                                    Hyaline casts LM Ql   
(Urine sed)     0-8 [LPF]                       0-8             [facility]  
   
                                                    Lymphocytes Auto (Bld) [#/Vo  
l]Ordered By: Dwain Guzman on 2022   
   
                                                    Lymphocytes (Bld)   
[#/Vol]         1.2 10*3/uL                     1.00-4.8        [facility]  
   
                                                    Lymphocytes/100 WBC Auto (Bl  
d)Ordered By: Dwain Guzman on 2022   
   
                                                    Lymphocytes/100 WBC   
(Bld)           19.3 %                          .               [facility]  
   
                                                    MCH Auto (RBC) [Entitic mass  
]Ordered By: Dwain Guzman on 2022   
   
                                                    MCH (RBC) [Entitic   
mass]           31.1 pg                         24.7-34.3       [facility]  
   
                                                    MCHC Auto (RBC) [Mass/Vol]Or  
dered By: Dwain Guzman on 2022   
   
                      MCHC (RBC) [Mass/Vol] 34.4 g/dL             32.0-35.0  [facility]  
[facility]  
   
                                                    MCV Auto (RBC) [Entitic vol]  
Ordered By: Dwain Guzman on 2022   
   
                                                    MCV (RBC) [Entitic   
vol]            90.3 fL                                   [facility]  
   
                                                    Monocytes Auto (Bld) [#/Vol]  
Ordered By: Dwain Guzman on 2022   
   
                                                    Monocytes (Bld)   
[#/Vol]         0.5 10*3/uL                     0.0-0.8         [facility]  
   
                                                    Monocytes/100 WBC Auto (Bld)  
Ordered By: Dwain Guzman on 2022   
   
                                                    Monocytes/100 WBC   
(Bld)           7.6 %                           .               [facility]  
   
                                                    Neutrophils Auto (Bld) [#/Vo  
l]Ordered By: Dwain Guzman on 2022   
   
                                                    Neutrophils (Bld)   
[#/Vol]         4.3 10*3/uL                     1.8-7.7         [facility]  
   
                                                    Neutrophils/100 WBC Auto (Bl  
d)Ordered By: Dwain Guzman on 2022   
   
                                                    Neutrophils/100 WBC   
(Bld)           70.3 %                          .               [facility]  
   
                                                    Nitrite Test strip Ql (U)Ord  
ered By: Dwain Guzman on 2022   
   
                      Nitrite Ql (U) Positive              Negative   [facility]  
   
                                                    No Panel InformationOrdered   
By: Dwain Guzman on 2022   
   
                                                    Estimated GFR   
() > 60 mL/Min                                     [facility]  
   
                                        Comment on above:   GFR estimated refere  
nce range: According to KDOQI guidelines,   
<60 ml/min/1.73m2 is sufficient to diagnose a patient with   
chronic kidney disease.   
   
                                                    Pharmacy Creatinine   
Clearance (Chem 132.19                                          [facility]  
   
                                                    Platelet mean volume Auto (B  
ld) [Entitic vol]Ordered By: Dwain Guzman on   
2022   
   
                                                    Platelet mean volume   
(Bld) [Entitic vol] 7.7 fL                          6.3-10.7        [facility]  
   
                                                    Platelets Auto (Bld) [#/Vol]  
Ordered By: Dwain Guzman on 2022   
   
                                                    Platelets (Bld)   
[#/Vol]         214 10*3/uL                     150-450         [facility]  
   
                                                    Protein Auto test strip (U)   
[Mass/Vol]Ordered By: Dwain Guzman on 2022   
   
                                                    Protein (U)   
[Mass/Vol]      Negative                        Negative        [facility]  
   
                                                    Protein [Mass/volume] in Ser  
um or PlasmaOrdered By: Dwain Guzman on 2022   
   
                      Protein [Mass/Vol] 6.7 g/dL              6.1-7.9    [facility]  
   
                                                    RBC Auto (Bld) [#/Vol]Ordere  
d By: Dwain Guzman on 2022   
   
                      RBC (Bld) [#/Vol] 4.03 10*6/uL            3.60-5.00  [facility]  
   
                                                    Serum or plasma alanine amin  
otransferase measurement without P-5'-P (enzymatic   
activiOrdered By: Dwain Guzman on 2022   
   
                                                    ALT No additional   
P-5'-P [Catalytic   
activity/Vol]   18 U/L                          10-60           [facility]  
   
                                                    Serum or plasma albumin/glob  
ulin mass ratioOrdered By: Dwain Guzman on   
2022   
   
                                                    Albumin/Globulin   
[Mass ratio]    1.4 {ratio}                                     [facility]  
   
                                                    Serum or plasma alkaline kacey  
sphatase measurement (enzymatic   
activity/volume)Ordered   
By: Dwain Guzman on 2022   
   
                                                    ALP [Catalytic   
activity/Vol]   64 U/L                          32-92           [facility]  
   
                                                    Serum or plasma anion gap de  
terminationOrdered By: Dwain Guzman on 2022   
   
                      Anion gap [Moles/Vol] 12.6 mmol/L            6.0-15.0   [facility]  
   
                                                    Serum or plasma aspartate am  
inotransferase measurement (enzymatic   
activity/volume)Ordered By: Dwain Guzman on 2022   
   
                                                    AST [Catalytic   
activity/Vol]   18 U/L                          10-42           [facility]  
   
                                                    Serum or plasma beta choriog  
onadotropin measurement (units/volume)Ordered By:   
Dwain Guzman on 2022   
   
                      [place] [address].00 m[IU]/mL                       
  [facility]  
   
                                        Comment on above:   Approximate Approxim  
ate hCG  
Gestational Age Range (mIU/ml)  
(weeks)  
0.2-1 5-50  
1-2   
2-3 100-5,000  
3-4 500-10,000  
4-5 1,000-50,000  
5-6 10,000-100,000  
6-8 15,000-200,000  
8-12 10,000-100,000  
   
   
                                                    Serum or plasma calcium mason  
urement (mass/volume)Ordered By: Dwain Guzman on   
2022   
   
                      Calcium [Mass/Vol] 8.9 mg/dL             8.2-10.2   [facility]  
   
                                                    Serum or plasma chloride tho  
surement (moles/volume)Ordered By: Dwain Guzman on   
2022   
   
                      Chloride [Moles/Vol] 104 mmol/L                 [facility]  
   
                                                    Serum or plasma glucose mason  
urement (mass/volume)Ordered By: Dwain Guzman on   
2022   
   
                      Glucose [Mass/Vol] 79 mg/dL                   [facility]  
   
                                        Comment on above:   ADA recommended refe  
rence range  
Random Glucose Reference Range is dependent on time and content   
of last meal. Glucose of more than 200 mg/dL in a nonstressed,   
ambulatory subject supports the diagnosis of Diabetes Mellitus.   
   
                                                    Serum or plasma potassium me  
asurement (moles/volume)Ordered By: Dwain Guzman on  
   
2022   
   
                      Potassium [Moles/Vol] 3.8 mmol/L            3.5-5.1    [facility]  
   
                                                    Serum or plasma sodium measu  
rement (moles/volume)Ordered By: Dwain Guzman on   
2022   
   
                      Sodium [Moles/Vol] 136 mmol/L            136-146    [facility]  
   
                                                    Serum or plasma total biliru  
bin measurement (mass/volume)Ordered By: Dwain Guzman on   
2022   
   
                      Bilirubin [Mass/Vol] 0.8 mg/dL             0.3-1.2    [facility]  
   
                                                    Serum or plasma total carbon  
 dioxide measurement (moles/volume)Ordered By:   
Dwain Guzman on 2022   
   
                      CO2 [Moles/Vol] 23.2 mmol/L            22.0-30.0  [facility]  
   
                                                    Serum or plasma urea nitroge  
n measurement (mass/volume)Ordered By: Dwain Guzman  
 on   
2022   
   
                                                    Urea nitrogen   
[Mass/Vol]      5 mg/dL                         9-            [facility]  
   
                                                    Specific gravity Auto test s  
trip (U) [Rel density]Ordered By: Dwain Guzman on   
2022   
   
                                                    Specific gravity (U)   
[Rel density]   1.015                           1.001-1.030     [facility]  
   
                                                    Squamous epithelial cells de  
tection in urine sediment by light microscopyOrdered  
By:   
Dwain Guzman on 2022   
   
                                                    Epithelial   
cells.squamous LM Ql   
(Urine sed)     5-9 [HPF]                       0-2             [facility]  
   
                                                    Urine bacteria detection by   
automated methodOrdered By: Dwain Guzman on   
2022   
   
                      Bacteria Auto Ql (U) 1+                    None Seen  [facility]  
   
                                                    Urine clarity by refractomet  
ry automatedOrdered By: Dwain Guzman on 2022   
   
                                                    Clarity Refractometry   
automated (U)   Clear                           Clear           [facility]  
   
                                                    Urine glucose measurement by  
 automated test strip (mass/volume)Ordered By:   
Dwain Guzman on 2022   
   
                                                    Glucose Auto test   
strip (U) [Mass/Vol] Normal mg/dL                    Normal          [facility]  
   
                                                    Urine hemoglobin detection b  
y automated test stripOrdered By: Dwain Guzman on   
2022   
   
                                                    Hemoglobin Auto test   
strip Ql (U)    Negative                        Negative        [facility]  
   
                                                    Urine leukocyte esterase det  
ection by automated test stripOrdered By: Dwain Guzman   
on 2022   
   
                                                    Leukocyte esterase   
Auto test strip Ql   
(U)             Negative                        Negative        [facility]  
   
                                                    Urobilinogen Auto test strip  
 (U) [Mass/Vol]Ordered By: Dwain Guzman on   
2022   
   
                                                    Urobilinogen (U)   
[Mass/Vol]      Normal mg/dL                    Normal          [facility]  
   
                                                    pH Auto test strip (U)Ordere  
d By: Dwain Guzman on 2022   
   
                      pH (U)     7.0 [pH]              5.0-9.0    [facility]  
   
                                                    Serum or plasma beta choriog  
onadotropin measurement (units/volume)Ordered By:   
RO DUFFY on 2022   
   
                      HCG.beta subunit Qn 2212.00 m[IU]/mL                        
 [facility]  
   
                                        Comment on above:   Approximate Approxim  
ate hCG  
Gestational Age Range (mIU/ml)  
(weeks)  
0.2-1 5-50  
1-2   
2-3 100-5,000  
3-4 500-10,000  
4-5 1,000-50,000  
5-6 10,000-100,000  
6-8 15,000-200,000  
8-12 10,000-100,000  
   
   
                                                    Serum or plasma beta choriog  
onadotropin measurement (units/volume)Ordered By:   
RO DUFFY on 2022   
   
                      HCG.beta subunit Qn 964.31 m[IU]/mL                         
[facility]  
   
                                        Comment on above:   Approximate Approxim  
ate hCG  
Gestational Age Range (mIU/ml)  
(weeks)  
0.2-1 5-50  
1-2   
2-3 100-5,000  
3-4 500-10,000  
4-5 1,000-50,000  
5-6 10,000-100,000  
6-8 15,000-200,000  
8-12 10,000-100,000  
   
   
                                                    CBC AUTO DIFFon 2022   
   
                      BASO #     0.1 103/ul Normal     0.0-0.1    [facility]  
   
                                        Comment on above:   Performed By: #### C  
BC ####  
[facility] [facility]  
[address] [place] [address]  
Dr. Chalino Winters   
   
                                                    Basophils/100 WBC   
(Bld)           0.9 %           Normal          0.2-2.0         [facility]  
   
                                        Comment on above:   Performed By: #### C  
BC ####  
[facility]  
[address] [place] [address]  
Dr. Chalino Winters   
   
                      EO #       0.2 103/ul Normal     0.0-0.7    [facility]  
   
                                        Comment on above:   Performed By: #### C  
BC ####  
[facility]  
[address] [place] [address]  
Dr. Chalino Winters   
   
                                                    Eosinophils/100 WBC   
(Bld)           2.8 %           Normal          0.9-7.0         [facility]  
   
                                        Comment on above:   Performed By: #### C  
BC ####  
[facility]  
[address] [place] [address]  
Dr. Chalino Winters   
   
                                                    Erythrocyte   
distribution width   
(RBC) [Ratio]   12.1 %          Normal          11.0-15.0       [facility]  
   
                                        Comment on above:   Performed By: #### C  
BC ####  
[facility]  
[address] [place] [address]  
Dr. Chalino Winters   
   
                                                    Hematocrit (Bld)   
[Volume fraction] 34.3 %          Critically low  36.0-48.0       [facility]  
   
                                        Comment on above:   Performed By: #### C  
BC ####  
[facility]  
[address] [place] [address]  
Dr. Chalino Winters   
   
                                                    Hemoglobin (Bld)   
[Mass/Vol]      11.6 g/dL       Critically low  12.0-16.0       [facility]  
   
                                        Comment on above:   Performed By: #### C  
BC ####  
[facility]  
[address] [place] [address]  
Dr. Chalino Winters   
   
                      IG #       0.02 10e3/ul Normal     0.00-0.03  [facility]  
   
                                        Comment on above:   Performed By: #### C  
BC ####  
[facility]  
[address] [place] [address]  
Dr. Chalino Winters   
   
                      IG %       0.4 %      Normal     0.0-0.5    [facility]  
   
                                        Comment on above:   Performed By: #### C  
BC ####  
[facility]  
[address] [place] [address]  
Dr. Chalino Winters   
   
                      LYMPH #    1.4 103/ul Normal     1.2-3.8    [facility]  
   
                                        Comment on above:   Performed By: #### C  
BC ####  
[facility]  
[address] [place] [address]  
Dr. Chalino Winters   
   
                                                    Lymphocytes/100 WBC   
(Bld)           25.2 %          Normal          20.5-60.0       [facility]  
   
                                        Comment on above:   Performed By: #### C  
BC ####  
[facility]  
[address] [place] [address]  
Dr. Chalino Winters   
   
                      MANUAL DIFF REQ NO         Normal                [facility]  
   
                                        Comment on above:   Performed By: #### C  
BC ####  
[facility]  
[address] [address]  
Dr. Chalino Winters   
   
                                                    MCH (RBC) [Entitic   
mass]           30.5 pg         Normal          26.7-34.0       [facility]  
   
                                        Comment on above:   Performed By: #### C  
BC ####  
[facility]  
[address] [address]  
Dr. Chalino Winters   
   
                      MCHC (RBC) [Mass/Vol] 33.8 g/dL  Normal     29.9-35.2  [facility]  
   
                                        Comment on above:   Performed By: #### C  
BC ####  
[facility]  
[address] [place] [address]  
Dr. Chalino Winters   
   
                                                    MCV (RBC) [Entitic   
vol]            90.3 fL         Normal          81.0-99.0       [facility]  
   
                                        Comment on above:   Performed By: #### C  
BC ####  
[facility]  
[address] [place] [address]  
Dr. Chalino Winters   
   
                      MONO #     0.4 103/ul Normal     0.3-0.8    [facility]  
   
                                        Comment on above:   Performed By: #### C  
BC ####  
[facility]  
[address] [place] [address]  
Dr. Chalino Winters   
   
                                                    Monocytes/100 WBC   
(Bld)           7.4 %           Normal          1.7-12.0        [facility]  
   
                                        Comment on above:   Performed By: #### C  
BC ####  
[facility]  
[address] [place] [address]  
Dr. Chalino Winters   
   
                      NEUT #     3.5 103/ul Normal     1.4-6.5    [facility]  
   
                                        Comment on above:   Performed By: #### C  
BC ####  
[facility]  
[address] [place] [address]  
Dr. Chalino Winters   
   
                                                    Neutrophils/100 WBC   
(Bld)           63.3 %          Normal          43.0-75.0       [facility]  
   
                                        Comment on above:   Performed By: #### C  
BC ####  
[facility]  
[address] [place] [address]  
Dr. Chalino Winters   
   
                                                    Platelet mean volume   
(Bld) [Entitic vol] 8.9 fL          Critically low  9.5-13.5        [facility]  
   
                                        Comment on above:   Performed By: #### C  
BC ####  
[facility]  
[address] [address]  
Dr. Chalino Winters   
   
                      PLT        247 103/ul Normal     150-450    [facility]  
   
                                        Comment on above:   Performed By: #### C  
BC ####  
[facility]  
[address] [place] [address]  
Dr. Chalino Winters   
   
                      RBC        3.80 106/ul Critically low 4.20-5.40  [facility]  
   
                                        Comment on above:   Performed By: #### C  
BC ####  
[facility]  
[address] [place] [address]  
Dr. Chalino Winters   
   
                      WBC        5.4 103/ul Normal     4.0-11.0   [facility]  
   
                                        Comment on above:   Performed By: #### C  
BC ####  
[facility]  
[address] [place] [address]  
Dr. Chalino Winters   
   
                                                    ER URINE PROFILEon   
2   
   
                      Bilirubin Ql (U) Negative   Normal     NEGATIVE   [facility]  
   
                                        Comment on above:   Performed By: #### P  
REGU, ERUR ####  
[facility]  
[address] [place] [address]  
Dr. Chalino Winters   
   
                      Clarity (U) CLEAR      Normal     CLEAR      [facility]  
   
                                        Comment on above:   Performed By: #### P  
REGU, ERUR ####  
[facility]  
[address] [place] [address]  
Dr. Chalino Winters   
   
                      Color (U)  LT. YELLOW Normal     YELLOW     [facility]  
   
                                        Comment on above:   Performed By: #### P  
REGU, ERUR ####  
[facility]  
[address] [place] [address]  
Dr. Chalino Winters   
   
                                        ERUAHD              A micrscopic   
examination will be   
performed if   
indicated.          Normal                                  [facility]  
   
                                        Comment on above:   Performed By: #### P  
REGU, ERUR ####  
[facility]  
[address] [place] [address]  
Dr. Chalino Winters   
   
                      Glucose Ql (U) Negative   Normal     NEGATIVE   [facility]  
   
                                        Comment on above:   Performed By: #### P  
REGU, ERUR ####  
[facility]  
[address] [place], [place] [id number]  
Dr. Chalino Winters   
   
                      Hemoglobin Ql (U) Negative   Normal     NEGATIVE   [facility]  
   
                                        Comment on above:   Performed By: #### P  
REGU, ERUR ####  
[facility]  
[address] [address]  
Dr. Chalino Winters   
   
                      Ketones Ql (U) Negative   Normal     NEGATIVE   [facility]  
   
                                        Comment on above:   Performed By: #### P  
REGU, ERUR ####  
[facility]  
[address] [place] [address]  
Dr. Chalino Winters   
   
                      LEUKOCYTES Negative   Normal     NEGATIVE   [facility]  
   
                                        Comment on above:   Performed By: #### P  
REGU, ERUR ####  
[facility]  
[address] [place] [address]  
Dr. Chalino Winters   
   
                      Nitrite Ql (U) Negative   Normal     NEGATIVE   [facility]  
   
                                        Comment on above:   Performed By: #### P  
REGU, ERUR ####  
[facility]  
[address] [place] [address]  
Dr. Chalino Winters   
   
                      pH (U)     6.5 [pH]   Normal     5-9        [facility]  
   
                                        Comment on above:   Performed By: #### P  
REGU, ERUR ####  
[facility]  
[address] [place] [address]  
Dr. Chalino Winters   
   
                      SPEC GRAVITY 1.010      Normal     1.005-<=1.025 [facility]  
   
                                        Comment on above:   Performed By: #### P  
REGU, ERUR ####  
[facility]  
[address] [place] [address]  
Dr. Chalino Winters   
   
                          UA PROTEIN   Negative     Normal       NEGATIVE/   
TRACE                                   [facility]  
   
                                        Comment on above:   Performed By: #### P  
REGU, ERUR ####  
[facility]  
[address] [place] [address]  
Dr. Chalion Winters   
   
                      UR MICRO IND NOT INDICATED Normal                [facility]  
   
                                        Comment on above:   Performed By: #### P  
REGU, ERUR ####  
[facility]  
[address] [place] [address]  
Dr. Chalino Winters   
   
                      Urobilinogen Qn (U) 1.0 {Naeta'U}/dL Normal     0.2 - 1.  
0  [facility]  
   
                                        Comment on above:   Performed By: #### P  
REGU, ERUR ####  
[facility]  
[address] [address]  
Dr. Chalino Winters   
   
                                                    PREG QUANT HCGon 2022   
   
                      HCG QUANT  187 mIU/mL Normal                [facility]  
   
                                        Comment on above:   Performed By: #### P  
REGQNT ####  
[facility]  
[address] [place] [address]  
Dr. Chalino Winters   
   
                      HCG RANGE  SEE BELOW  Normal                [facility]  
   
                                        Comment on above:   Result Comment: 5-50  
 0.2-1 WEEK  1-2 WEEKS 100-5,000 2-3  
   
WEEKS 500-10,000 3-4 WEEKS 1,000-50,000 4-5 WEEKS 10,000-100,000   
5-6 WEEKS 15,000-200,000 6-8 WEEKS 10,000-100,000 2-3 MONTHS   
   
                                                            Performed By: #### P  
REGQNT ####  
[facility]  
[address] [place] [address]  
Dr. Chalino Winters   
   
                                                    PREGNANCY URon 2022   
   
                      PREGNANCY, QUAL Negative   Normal     NEGATIVE   [facility]  
   
                                        Comment on above:   Performed By: #### P  
REGU, ERUR ####  
[facility]  
[address] [place] [address]  
Dr. Chalino Winters   
   
                                                    PROF CHEM 8 (BAS METB)on    
   
                      Anion gap [Moles/Vol] 12.9 mmol/L Normal                [facility]  
   
                                        Comment on above:   Performed By: #### B  
MP ####  
[facility]  
[address] [place] [address]  
Dr. Chalino Winters   
   
                      Calcium [Mass/Vol] 8.5 mg/dL  Normal     8.5-10.1   [facility]  
   
                                        Comment on above:   Performed By: #### B  
MP ####  
[facility]  
[address] [place] [address]  
Dr. Chalino Winters   
   
                      Chloride [Moles/Vol] 103 mmol/L Normal          [facility]  
   
                                        Comment on above:   Performed By: #### B  
MP ####  
[facility]  
[address] [place] [address]  
Dr. Chalino Winters   
   
                      CO2 [Moles/Vol] 26.6 mmol/L Normal     21.0-32.0  [facility]  
   
                                        Comment on above:   Performed By: #### B  
MP ####  
[facility]  
[address] [place] [address]  
Dr. Chalino Winters   
   
                      Creatinine [Mass/Vol] 0.82 mg/dL Normal     0.55-1.02  [facility]  
   
                                        Comment on above:   Performed By: #### B  
MP ####  
[facility]  
[address] [place] [address]  
Dr. Chalino Winters   
   
                      EGFR-AF AMERICAN >60        Normal     >=60       [facility]  
   
                                        Comment on above:   Performed By: #### B  
MP ####  
[facility]  
[address] [address]  
Dr. Chalino Winters   
   
                      EGFR-NON AF AMERICAN >60        Normal     >=60       [facility]  
   
                                        Comment on above:   Performed By: #### B  
MP ####  
[facility]  
[address] [place] [address]  
Dr. Chalino Winters   
   
                      Glucose [Mass/Vol] 93 mg/dL   Normal          [facility]  
   
                                        Comment on above:   Performed By: #### B  
MP ####  
[facility]  
[address] [place] [address]  
Dr. Chalino Winters   
   
                      Potassium [Moles/Vol] 3.5 mmol/L Normal     3.5-5.1    [facility]  
   
                                        Comment on above:   Performed By: #### B  
MP ####  
[facility]  
[address] [place] [address]  
Dr. Chalino Winters   
   
                      Sodium [Moles/Vol] 139 mmol/L Normal     136-145    [facility]  
   
                                        Comment on above:   Performed By: #### B  
MP ####  
[facility]  
[address] [place] [address]  
Dr. Chalino Winters   
   
                                                    Urea nitrogen   
[Mass/Vol]      9.0 mg/dL       Normal          7.0-18.0        [facility]  
   
                                        Comment on above:   Performed By: #### B  
MP ####  
[facility]  
[address] [place] [address]  
Dr. Chalino Winters   
   
                                                    Urea   
nitrogen/Creatinine   
[Mass ratio]    11.0 mg/mg      Normal                          [facility]  
   
                                        Comment on above:   Performed By: #### B  
MP ####  
[facility]  
[address] [place] [address]  
Dr. Chalino Winters   
   
                                                    Serum or plasma beta choriog  
onadotropin measurement (units/volume)Ordered By:   
RO DUFFY on 2022   
   
                      HCG.beta subunit Qn 41.65 m[IU]/mL                       F  
[facility]  
   
                                        Comment on above:   Approximate Approxim  
ate hCG  
Gestational Age Range (mIU/ml)  
(weeks)  
0.2-1 5-50  
1-2   
2-3 100-5,000  
3-4 500-10,000  
4-5 1,000-50,000  
5-6 10,000-100,000  
6-8 15,000-200,000  
8-12 10,000-100,000  
   
   
                                                    CBC AUTO DIFFon 2022   
   
                      BASO #     0.0 103/ul Normal     0.0-0.1    [facility]  
   
                                        Comment on above:   Performed By: #### C  
BC ####  
[facility]  
[address] [place] [address]  
Dr. Chalino Winters   
   
                                                    Basophils/100 WBC   
(Bld)           1.2 %           Normal          0.2-2.0         [facility]  
   
                                        Comment on above:   Performed By: #### C  
BC ####  
[facility]  
[address] [place] [address]  
Dr. Chalino Winters   
   
                      EO #       0.2 103/ul Normal     0.0-0.7    [facility]  
   
                                        Comment on above:   Performed By: #### C  
BC ####  
[facility]  
[address] [place] [address]  
Dr. Chalino Winters   
   
                                                    Eosinophils/100 WBC   
(Bld)           7.2 %           Critically high 0.9-7.0         [facility]  
   
                                        Comment on above:   Performed By: #### C  
BC ####  
[facility]  
[address] [place] [address]  
Dr. Chalino Winters   
   
                                                    Erythrocyte   
distribution width   
(RBC) [Ratio]   11.9 %          Normal          11.0-15.0       [facility]  
   
                                        Comment on above:   Performed By: #### C  
BC ####  
[facility]  
[address] [place] [address]  
Dr. Chalino Winters   
   
                                                    Hematocrit (Bld)   
[Volume fraction] 33.8 %          Critically low  36.0-48.0       [facility]  
   
                                        Comment on above:   Performed By: #### C  
BC ####  
[facility]  
[address] [place] [address]  
Dr. Chalino Winters   
   
                                                    Hemoglobin (Bld)   
[Mass/Vol]      11.4 g/dL       Critically low  12.0-16.0       [facility]  
   
                                        Comment on above:   Performed By: #### C  
BC ####  
[facility]  
[address] [place] [address]  
Dr. Chalino Winters   
   
                      IG #       0.01 10e3/ul Normal     0.00-0.03  [facility]  
   
                                        Comment on above:   Performed By: #### C  
BC ####  
[facility]  
[address] [place] [address]  
Dr. Chalino Winters   
   
                      IG %       0.3 %      Normal     0.0-0.5    [facility]  
   
                                        Comment on above:   Performed By: #### C  
BC ####  
[facility]  
[address] [place] [address]  
Dr. Chalino Winters   
   
                      LYMPH #    1.1 103/ul Critically low 1.2-3.8    [facility]  
   
                                        Comment on above:   Performed By: #### C  
BC ####  
[facility]  
[address] [place] [address]  
Dr. Chalino Winters   
   
                                                    Lymphocytes/100 WBC   
(Bld)           33.2 %          Normal          20.5-60.0       [facility]  
   
                                        Comment on above:   Performed By: #### C  
BC ####  
[facility]  
[address] [place] [address]  
Dr. Chalino Winters   
   
                      MANUAL DIFF REQ NO         Normal                [facility]  
   
                                        Comment on above:   Performed By: #### C  
BC ####  
[facility]  
[address] [place] [address]  
Dr. Chalino Winters   
   
                                                    MCH (RBC) [Entitic   
mass]           30.8 pg         Normal          26.7-34.0       [facility]  
   
                                        Comment on above:   Performed By: #### C  
BC ####  
[facility]  
[address] [place] [address]  
Dr. Chalino Winters   
   
                      MCHC (RBC) [Mass/Vol] 33.7 g/dL  Normal     29.9-35.2  [facility]  
   
                                        Comment on above:   Performed By: #### C  
BC ####  
[facility]  
[address] [address]  
Dr. Chalino Winters   
   
                                                    MCV (RBC) [Entitic   
vol]            91.4 fL         Normal          81.0-99.0       [facility]  
   
                                        Comment on above:   Performed By: #### C  
BC ####  
[facility]  
[address] [address]  
Dr. Chalino Winters   
   
                      MONO #     0.4 103/ul Normal     0.3-0.8    [facility]  
   
                                        Comment on above:   Performed By: #### C  
BC ####  
[facility]  
[address] [address]  
Dr. Chalino Winters   
   
                                                    Monocytes/100 WBC   
(Bld)           12.9 %          Critically high 1.7-12.0        [facility]  
   
                                        Comment on above:   Performed By: #### C  
BC ####  
[facility]  
[address] [address]  
Dr. Chalino Winters   
   
                      NEUT #     1.5 103/ul Normal     1.4-6.5    [facility]  
   
                                        Comment on above:   Performed By: #### C  
BC ####  
[facility]  
[address] [address]  
Dr. Chalino Winters   
   
                                                    Neutrophils/100 WBC   
(Bld)           45.2 %          Normal          43.0-75.0       [facility]  
   
                                        Comment on above:   Performed By: #### C  
BC ####  
[facility]  
[address] [address]  
Dr. Chalino Winters   
   
                                                    Platelet mean volume   
(Bld) [Entitic vol] 9.2 fL          Critically low  9.5-13.5        [facility]  
   
                                        Comment on above:   Performed By: #### C  
BC ####  
[facility]  
[address] [address]  
Dr. Chalino Winters   
   
                      PLT        223 103/ul Normal     150-450    [facility]  
   
                                        Comment on above:   Performed By: #### C  
BC ####  
[facility]  
[address] [address]  
Dr. Chalino Winters   
   
                      RBC        3.70 106/ul Critically low 4.20-5.40  [facility]  
   
                                        Comment on above:   Performed By: #### C  
BC ####  
[facility]  
[address], [place] [address]  
Dr. Chalino Winters   
   
                      WBC        3.3 103/ul Critically low 4.0-11.0   [facility]  
   
                                        Comment on above:   Performed By: #### C  
BC ####  
[facility]  
[address], [place] [address]  
Dr. Chalino Winters   
   
                                                    COVID Quick Testingon 2022   
   
                      Result     Positive                         [facility]   
[facility]   
[employer]  
Other Phone:   
[phone]  
   
                                                    CT ABD/PELV W CONon 20  
22   
   
                                        CT ABD/PELV W CON   EXAM: CT SCAN OF THE  
   
ABDOMEN AND PELVIS   
WITH INTRAVENOUS   
CONTRAST  
DATE OF EXAM:   
2022 11:42 PM EDT  
HISTORY: UNSPECIFIED   
ABDOMINAL PAIN a   
21-year-old with right   
anterior left lower  
quadrant abdominal   
pain for 2 days after   
laparoscopic pelvic   
surgery. Patient  
states that she had an   
ectopic pregnancy   
removed but there is   
not an actual  
ectopic pregnancy   
present.  
COMPARISON: Ultrasound   
of the pelvis dated   
2021.  
TECHNIQUE: CT   
examination of the   
abdomen and pelvis was   
performed following   
the  
intravenous   
administration of IV   
contrast. CT dose   
lowering techniques   
were  
used, to include:   
automated exposure   
control, adjustment   
for patient size,  
and/or use of   
iterative   
reconstruction.  
Contrast: 50 mL of   
Omnipaque 350  
FINDINGS:  
Lines and Tubes: None  
Lower Chest: Normal  
Free Air: None.  
Liver: Normal  
Gallbladder: Removed  
Common Bile Duct:   
Normal  
Pancreas: Normal  
Spleen: Normal  
Adrenal Glands:  
Right: Normal  
Left: Normal  
Kidneys:  
Right Kidney: Normal.  
Right Ureter: Normal.  
Left Kidney:   
Extrarenal pelvis  
Left Ureter: Normal.  
GI Tract:  
Stomach: Decompressed  
Small Bowel: Normal  
Appendix: Normal on   
coronal image 30  
Large Bowel: Moderate   
retention of stool  
Mesentery/Peritoneum:   
Normal  
Vasculature:  
Aorta: Normal.  
IVC: Normal.  
Linda Vein: Normal.  
Retroperitoneum:   
Normal  
Abdominal/Pelvic Wall:   
Normal  
Bladder: Partially   
distended.  
Reproductive:   
Retroverted uterus.   
There is a fluid   
attenuating 16 mm area   
in the  
left ovary. The right   
ovary appears enlarged   
although difficult to   
fully  
visualize due to   
volume averaging.  
Musculoskeletal:   
Well-corticated   
lucency involving the   
inferior endplate of   
L3  
most likely a   
Schmorl's node.   
Leftward curvature of   
the lumbar spine  
Free Fluid: None.  
IMPRESSION:  
1. Enlarged right   
ovary with volume   
averaging with   
unopacified bowel   
which  
limits evaluation.   
Given the fact that   
the right ovary   
appears enlarged,  
transvaginal   
ultrasound with   
spectral Doppler is   
recommended to exclude   
acute  
ovarian pathology such   
as torsion.  
2. Left ovarian   
dominant follicle.  
3. Status post   
cholecystectomy.  
4. Retention of stool   
throughout the large   
bowel.  
5. Fluid-filled small   
bowel. Please   
correlate for viral   
etiologies.  
CRITICAL findings:   
Spoke with Dr. Larsen   
at 1:05 am EST  
Electronically   
authenticated by:   
DEYANIRA ROMO Date:   
2022 01:06    Normal                                  [facility]  
   
                                                    PREG HCG QUALon 2022   
   
                      PREGNANCY, QUAL Negative   Normal     NEGATIVE   [facility]  
   
                                        Comment on above:   Performed By: #### P  
REG ####  
[facility]  
[address] [address]  
Dr. Chalino Winters   
   
                                                    PROF 14(COMP METB)on   
022   
   
                      Albumin [Mass/Vol] 3.9 g/dL   Normal     3.4-5.0    [facility]  
   
                                        Comment on above:   Performed By: #### C  
MP ####[facility] [id number]   
[address] [address]DrDeon Winters   
   
                                                    Albumin/Globulin   
[Mass ratio]    1.1 {ratio}     Normal                          [facility]  
   
                                        Comment on above:   Performed By: #### C  
MP ####[facility] [id number]   
[address] [address]Dr. Chalino Winters   
   
                                                    ALP [Catalytic   
activity/Vol]   81 U/L          Normal                    [facility]  
   
                                        Comment on above:   Performed By: #### C  
MP ####[facility] [id number]   
[address] [address]Dr. Chalino Winters   
   
                                                    ALT [Catalytic   
activity/Vol]   24 U/L          Normal          14-59           [facility]  
   
                                        Comment on above:   Performed By: #### C  
MP ####[facility] [id number]   
[address] [address]DrDeon Winters   
   
                      Anion gap [Moles/Vol] 12.4 mmol/L Normal                [facility]  
   
                                        Comment on above:   Performed By: #### C  
MP ####[facility] [id number]   
[address] [address]Dr. Chalino Winters   
   
                                                    AST [Catalytic   
activity/Vol]   25 U/L          Normal          15-37           [facility]  
   
                                        Comment on above:   Performed By: #### C  
MP ####[facility] [address] [place], [place] [address]Dr. Chalino Winters   
   
                      Bilirubin [Mass/Vol] 0.5 mg/dL  Normal     0.2-1.0    [facility]  
   
                                        Comment on above:   Performed By: #### C  
MP ####[facility] [id number] [place], [place] [address]Dr. Chalino Winters   
   
                      Calcium [Mass/Vol] 8.8 mg/dL  Normal     8.5-10.1   [facility]  
   
                                        Comment on above:   Performed By: #### C  
MP ####[facility] [address] [place], [place] [address]Dr. Chalino Winters   
   
                      Chloride [Moles/Vol] 102 mmol/L Normal          [facility]  
   
                                        Comment on above:   Performed By: #### C  
MP ####[facility] [address] [place], [place] [address]Dr. Chalino Winters   
   
                      CO2 [Moles/Vol] 29.0 mmol/L Normal     21.0-32.0  [facility]  
   
                                        Comment on above:   Performed By: #### C  
MP ####[facility] [id number] [place], [place] [address]Dr. Chalino Winters   
   
                      Creatinine [Mass/Vol] 0.74 mg/dL Normal     0.55-1.02  [facility]  
   
                                        Comment on above:   Performed By: #### C  
MP ####[facility] [address] [place], [place] [address]Dr. Chalino Singh   
   
                      EGFR-AF AMERICAN >60        Normal     >=60       [facility]  
   
                                        Comment on above:   Performed By: #### C  
MP ####[facility] [id number] [place], [place] [address]Dr. Krystalelio Singh   
   
                      EGFR-NON AF AMERICAN >60        Normal     >=60       [facility]  
   
                                        Comment on above:   Performed By: #### C  
MP ####[facility] [address] [place], [place] [address]Dr. Chalino Winters   
   
                                                    Globulin (S)   
[Mass/Vol]      3.4 g/dL        Normal                          [facility]  
   
                                        Comment on above:   Performed By: #### C  
MP ####[facility] [id number]   
[address] [id number]. Chalino Winters   
   
                      Glucose [Mass/Vol] 87 mg/dL   Normal          [facility]  
   
                                        Comment on above:   Performed By: #### C  
MP ####[facility] [id number]   
[address] [id number]. Chalino Winters   
   
                      Potassium [Moles/Vol] 3.4 mmol/L Critically low 3.5-5.1     
 [facility]  
   
                                        Comment on above:   Performed By: #### C  
MP ####[facility] [address]   
[address] [address]Dr. Chalino Winters   
   
                      Protein [Mass/Vol] 7.3 g/dL   Normal     6.4-8.2    [facility]  
   
                                        Comment on above:   Performed By: #### C  
MP ####[facility] [address] [place], [place] [address]Dr. Chalino Winters   
   
                      Sodium [Moles/Vol] 140 mmol/L Normal     136-145    [facility]  
   
                                        Comment on above:   Performed By: #### C  
MP ####[facility] [id number]   
[address] [address]Dr. Chalino Winters   
   
                                                    Urea nitrogen   
[Mass/Vol]      11.0 mg/dL      Normal          7.0-18.0        [facility]  
   
                                        Comment on above:   Performed By: #### C  
MP ####[facility] [address] [place], [place] [address]Dr. Chalino Singh   
   
                                                    Urea   
nitrogen/Creatinine   
[Mass ratio]    14.9 mg/mg      Normal                          [facility]  
   
                                        Comment on above:   Performed By: #### C  
MP ####[facility] [id number] [place], [place] [address]Dr. Chalino Singh   
   
                                                    US PELVIS TRANSVAGon   
022   
   
                                        US PELVIS TRANSVAG  US PELVIS TRANSVA2022 1:29 AM EDT  
CLINICAL HISTORY: 21   
years old Female with   
UNSPECIFIED ABDOMINAL   
PAIN.  
TECHNIQUE: Multiple   
ultrasonographic and   
duplex images of the   
pelvis are  
obtained utilizing   
endovaginal probe.  
COMPARISON: CT abdomen   
pelvis performed on   
this date.  
FINDINGS:  
Uterus: Uterus is   
normal in size   
measuring 7.0 x 2.9 x   
5.2 cm. The   
endometrial  
stripe is normal in   
thickness at 3.2 mm.  
Ovaries and Adnexa:  
Right: The right ovary   
measures 2.6 x 2.5 x   
1.8 cm with volume of   
8.6 mL and  
demonstrates normal   
follicular change and   
color flow. Normal   
arterial and venous  
waveforms are   
identified.  
Left: The left ovary   
measures 3.1 x 2.2 x   
2.7 cm with volume of   
9.4 mL and  
demonstrates normal   
follicular change and   
color flow. Normal   
arterial and venous  
waveforms are   
identified.  
Free fluid: None.  
IMPRESSION:  
Normal transvaginal   
and transabdominal   
ultrasound of the   
pelvis.  
Electronically   
authenticated by:   
DARIELA DOSS   
Date: 2022 02:59 Normal                                  [facility]  
   
                                                    Basophils Auto (Bld) [#/Vol]  
Ordered By: PROVIDER TEMP on 2022   
   
                                                    Basophils (Bld)   
[#/Vol]         0.0 10*3/uL                     0.0-0.2         [facility]  
   
                                                    Basophils/100 WBC Auto (Bld)  
Ordered By: PROVIDER TEMP on 2022   
   
                                                    Basophils/100 WBC   
(Bld)           1.3 %                           .               [facility]  
   
                                                    Bilirubin Test strip Ql (U)O  
rdered By: PROVIDER TEMP on 2022   
   
                      Bilirubin Ql (U) Negative              Negative   [facility]  
   
                                                    Blood hemoglobin measurement  
 (mass/volume)Ordered By: PROVIDER TEMP on   
2022   
   
                                                    Hemoglobin (Bld)   
[Mass/Vol]      11.8 g/dL                       11.8-15.4       [facility]  
   
                                                    Blood leukocytes automated c  
ount (number/volume)Ordered By: PROVIDER TEMP on   
2022   
   
                      WBC (Bld) [#/Vol] 3.0 10*3/uL            4.5-11.0   [facility] [place] [facility]  
   
                                                    Body fluid albumin measureme  
nt (mass/volume)Ordered By: PROVIDER TEMP on   
2022   
   
                                                    Albumin (Body fld)   
[Mass/Vol]      3.9 g/dL                        3.2-5.5         [facility]  
   
                                                    Color Auto (U)Ordered By: ILEANA LINCOLN TEMP on 2022   
   
                      Color (U)  Yellow                Yellow     [facility]  
   
                                                    Creatinine and Glomerular fi  
ltration rate.predicted panel (S/P/Bld)Ordered By:   
PROVIDER TEMP on 2022   
   
                      Creatinine [Mass/Vol] 0.76 mg/dL            0.44-1.03  [facility]  
   
                                                    Direct bilirubin measurement  
Ordered By: PROVIDER TEMP on 2022   
   
                                                    Bilirubin.direct   
[Mass/Vol]      mg/dL                           0.0-0.4         [facility]  
   
                                                    Eosinophils Auto (Bld) [#/Vo  
l]Ordered By: PROVIDER TEMP on 2022   
   
                                                    Eosinophils (Bld)   
[#/Vol]         0.2 10*3/uL                     0.0-0.45        [facility]  
   
                                                    Eosinophils/100 WBC Auto (Bl  
d)Ordered By: PROVIDER TEMP on 2022   
   
                                                    Eosinophils/100 WBC   
(Bld)           7.3 %                           .               [facility]  
   
                                                    Erythrocyte distribution wid  
th Auto (RBC) [Ratio]Ordered By: PROVIDER TEMP on   
2022   
   
                                                    Erythrocyte   
distribution width   
(RBC) [Ratio]   12.4 %                          11.9-15.3       [facility]  
   
                                                    Estimated glomerular filtrat  
ion rate (GFR) non- AmericanOrdered By:   
PROVIDER   
TEMP on 2022   
   
                                                    GFR/1.73 sq   
M.predicted among   
non-blacks MDRD   
(S/P/Bld) [Vol   
rate/Area]      > 60 mL/Min                                     [facility]  
   
                                                    Globulin Calc (S) [Mass/Vol]  
Ordered By: PROVIDER TEMP on 2022   
   
                                                    Globulin (S)   
[Mass/Vol]      3.1 g/dL                                        [facility]  
   
                                                    HCG (pregnancy test) IA.rapi  
d Ql (U)Ordered By: David Weathers on 2022   
   
                                                    HCG (pregnancy test)   
Ql (U)          Negative                                        [facility]  
   
                                                    Hematocrit Auto (Bld) [Volum  
e fraction]Ordered By: PROVIDER TEMP on 2022   
   
                                                    Hematocrit (Bld)   
[Volume fraction] 34.9 %                          34.0-46.4       [facility]  
   
                                                    Ketones Auto test strip (U)   
[Mass/Vol]Ordered By: PROVIDER TEMP on 2022   
   
                                                    Ketones (U)   
[Mass/Vol]      Negative                        Negative        [facility]  
   
                                                    Laboratory - Chemistry and C  
hemistry - challengeOrdered By: PROVIDER TEMP on   
2022   
   
                                                    Lipase [Catalytic   
activity/Vol]   22.0 U/L                        22-51           [facility]  
   
                                                    Laboratory - Hematology and   
Cell countsOrdered By: PROVIDER TEMP on 2022   
   
                                                    Nucleated RBC/100 WBC   
(Bld) [Ratio]   0.1 %                           0-0.5           [facility]  
   
                                                    Lymphocytes Auto (Bld) [#/Vo  
l]Ordered By: PROVIDER TEMP on 2022   
   
                                                    Lymphocytes (Bld)   
[#/Vol]         1.0 10*3/uL                     1.00-4.8        [facility]  
   
                                                    Lymphocytes/100 WBC Auto (Bl  
d)Ordered By: PROVIDER TEMP on 2022   
   
                                                    Lymphocytes/100 WBC   
(Bld)           34.5 %                          .               [facility]  
   
                                                    MCH Auto (RBC) [Entitic mass  
]Ordered By: PROVIDER TEMP on 2022   
   
                                                    MCH (RBC) [Entitic   
mass]           30.7 pg                         24.7-34.3       [facility]  
   
                                                    MCHC Auto (RBC) [Mass/Vol]Or  
dered By: PROVIDER TEMP on 2022   
   
                      MCHC (RBC) [Mass/Vol] 33.9 g/dL             32.0-35.0  [facility]  
   
                                                    MCV Auto (RBC) [Entitic vol]  
Ordered By: PROVIDER TEMP on 2022   
   
                                                    MCV (RBC) [Entitic   
vol]            90.4 fL                                   [facility]  
   
                                                    Monocytes Auto (Bld) [#/Vol]  
Ordered By: PROVIDER TEMP on 2022   
   
                                                    Monocytes (Bld)   
[#/Vol]         0.5 10*3/uL                     0.0-0.8         [facility]  
   
                                                    Monocytes/100 WBC Auto (Bld)  
Ordered By: PROVIDER TEMP on 2022   
   
                                                    Monocytes/100 WBC   
(Bld)           15.0 %                          .               [facility]  
   
                                                    Neutrophils Auto (Bld) [#/Vo  
l]Ordered By: PROVIDER TEMP on 2022   
   
                                                    Neutrophils (Bld)   
[#/Vol]         1.3 10*3/uL                     1.8-7.7         [facility]  
   
                                                    Neutrophils/100 WBC Auto (Bl  
d)Ordered By: PROVIDER TEMP on 2022   
   
                                                    Neutrophils/100 WBC   
(Bld)           41.9 %                          .               [facility]  
   
                                                    Nitrite Test strip Ql (U)Ord  
ered By: PROVIDER TEMP on 2022   
   
                      Nitrite Ql (U) Negative              Negative   [facility]  
   
                                                    No Panel InformationOrdered   
By: PROVIDER TEMP on 2022   
   
                                                    Estimated GFR   
() > 60 mL/Min                                     [facility]  
   
                                        Comment on above:   GFR estimated refere  
nce range: According to KDOQI guidelines,   
<60 ml/min/1.73m2 is sufficient to diagnose a patient with   
chronic kidney disease.   
   
                                                    Pharmacy Creatinine   
Clearance (Chem 123.23                                          [facility]  
   
                                                    Platelet mean volume Auto (B  
ld) [Entitic vol]Ordered By: PROVIDER TEMP on   
2022   
   
                                                    Platelet mean volume   
(Bld) [Entitic vol] 7.5 fL                          6.3-10.7        [facility]  
   
                                                    Platelets Auto (Bld) [#/Vol]  
Ordered By: PROVIDER TEMP on 2022   
   
                                                    Platelets (Bld)   
[#/Vol]         245 10*3/uL                     150-450         [facility]  
   
                                                    Protein Auto test strip (U)   
[Mass/Vol]Ordered By: PROVIDER TEMP on 2022   
   
                                                    Protein (U)   
[Mass/Vol]      Negative                        Negative        [facility]  
   
                                                    Protein [Mass/volume] in Ser  
um or PlasmaOrdered By: PROVIDER TEMP on 2022   
   
                      Protein [Mass/Vol] 7.0 g/dL              6.1-7.9    [facility] [place] [facility]  
   
                                                    RBC Auto (Bld) [#/Vol]Ordere  
d By: PROVIDER TEMP on 2022   
   
                      RBC (Bld) [#/Vol] 3.86 10*6/uL            3.60-5.00  [facility]  
   
                                                    Serum or plasma alanine amin  
otransferase measurement without P-5'-P (enzymatic   
activiOrdered By: PROVIDER TEMP on 2022   
   
                                                    ALT No additional   
P-5'-P [Catalytic   
activity/Vol]   24 U/L                          10-60           [facility]  
   
                                                    Serum or plasma albumin/glob  
ulin mass ratioOrdered By: PROVIDER TEMP on   
2022   
   
                                                    Albumin/Globulin   
[Mass ratio]    1.3 {ratio}                                     [facility]  
   
                                                    Serum or plasma alkaline kacey  
sphatase measurement (enzymatic   
activity/volume)Ordered   
By: PROVIDER TEMP on 2022   
   
                                                    ALP [Catalytic   
activity/Vol]   67 U/L                          32-92           [facility]  
   
                                                    Serum or plasma aspartate am  
inotransferase measurement (enzymatic   
activity/volume)Ordered By: PROVIDER TEMP on 2022   
   
                                                    AST [Catalytic   
activity/Vol]   29 U/L                          10-42           [facility]  
   
                                                    Serum or plasma calcium mason  
urement (mass/volume)Ordered By: PROVIDER TEMP on   
2022   
   
                      Calcium [Mass/Vol] 9.3 mg/dL             8.2-10.2   [facility] [place] [facility]  
   
                                                    Serum or plasma chloride tho  
surement (moles/volume)Ordered By: PROVIDER TEMP on   
2022   
   
                      Chloride [Moles/Vol] 100 mmol/L                 [facility]  
   
                                                    Serum or plasma glucose mason  
urement (mass/volume)Ordered By: PROVIDER TEMP on   
2022   
   
                      Glucose [Mass/Vol] 72 mg/dL                   [facility]  
   
                                        Comment on above:   ADA recommended refe  
rence range  
Random Glucose Reference Range is dependent on time and content   
of last meal. Glucose of more than 200 mg/dL in a nonstressed,   
ambulatory subject supports the diagnosis of Diabetes Mellitus.   
   
                                                    Serum or plasma non-glucuron  
idated bilirubin measurement (mass/volume)Ordered   
By:   
PROVIDER TEMP on 2022   
   
                                                    Bilirubin.indirect   
[Mass/Vol]      TNP                                             [facility]  
   
                                        Comment on above:   Test not performed   
   
                                                    Serum or plasma potassium me  
asurement (moles/volume)Ordered By: PROVIDER TEMP on  
   
2022   
   
                      Potassium [Moles/Vol] 3.6 mmol/L            3.5-5.1    [facility]  
   
                                                    Serum or plasma sodium measu  
rement (moles/volume)Ordered By: PROVIDER TEMP on   
2022   
   
                      Sodium [Moles/Vol] 139 mmol/L            136-146    [facility]  
   
                                                    Serum or plasma total biliru  
bin measurement (mass/volume)Ordered By: PROVIDER   
TEMP on   
2022   
   
                      Bilirubin [Mass/Vol] 0.6 mg/dL             0.3-1.2    [facility]  
   
                                                    Serum or plasma total carbon  
 dioxide measurement (moles/volume)Ordered By:   
PROVIDER   
TEMP on 2022   
   
                      CO2 [Moles/Vol] 31.3 mmol/L            22.0-30.0  [facility]  
   
                                                    Serum or plasma urea nitroge  
n measurement (mass/volume)Ordered By: PROVIDER TEMP  
 on   
2022   
   
                                                    Urea nitrogen   
[Mass/Vol]      9 mg/dL                                     [facility]  
   
                                                    Specific gravity Auto test s  
trip (U) [Rel density]Ordered By: PROVIDER TEMP on   
2022   
   
                                                    Specific gravity (U)   
[Rel density]   1.011                           1.001-1.030     [facility]  
   
                                                    Urine clarity by refractomet  
ry automatedOrdered By: PROVIDER TEMP on 2022   
   
                                                    Clarity Refractometry   
automated (U)   Clear                           Clear           [facility]  
   
                                                    Urine glucose measurement by  
 automated test strip (mass/volume)Ordered By:   
PROVIDER   
TEMP on 2022   
   
                                                    Glucose Auto test   
strip (U) [Mass/Vol] Normal mg/dL                    Normal          [facility]  
   
                                                    Urine hemoglobin detection b  
y automated test stripOrdered By: PROVIDER TEMP on   
2022   
   
                                                    Hemoglobin Auto test   
strip Ql (U)    Negative                        Negative        [facility]  
   
                                                    Urine leukocyte esterase det  
ection by automated test stripOrdered By: PROVIDER   
TEMP   
on 2022   
   
                                                    Leukocyte esterase   
Auto test strip Ql   
(U)             Negative                        Negative        [facility]  
   
                                                    Urobilinogen Auto test strip  
 (U) [Mass/Vol]Ordered By: PROVIDER TEMP on   
2022   
   
                                                    Urobilinogen (U)   
[Mass/Vol]      Normal mg/dL                    Normal          [facility]  
   
                                                    pH Auto test strip (U)Ordere  
d By: PROVIDER TEMP on 2022   
   
                      pH (U)     6.5 [pH]              5.0-9.0    [facility]  
   
                                                    Serum or plasma beta choriog  
onadotropin measurement (units/volume)Ordered By:   
RO DUFFY on 08-   
   
                      HCG.beta subunit Qn 2.88 m[IU]/mL                       [facility]  
   
                                        Comment on above:   Approximate Approxim  
ate hCG  
Gestational Age Range (mIU/ml)  
(weeks)  
0.2-1 5-50  
1-2   
2-3 100-5,000  
3-4 500-10,000  
4-5 1,000-50,000  
5-6 10,000-100,000  
6-8 15,000-200,000  
8-12 10,000-100,000  
   
   
                                                    Serum or plasma beta choriog  
onadotropin measurement (units/volume)Ordered By:   
RO DUFFY on 2022   
   
                      HCG.beta subunit Qn 17.04 m[IU]/mL                       [facility]  
   
                                        Comment on above:   Approximate Approxim  
ate hCG  
Gestational Age Range (mIU/ml)  
(weeks)  
0.2-1 5-50  
1-2   
2-3 100-5,000  
3-4 500-10,000  
4-5 1,000-50,000  
5-6 10,000-100,000  
6-8 15,000-200,000  
8-12 10,000-100,000  
   
   
                                                    Body fluid albumin measureme  
nt (mass/volume)Ordered By: Eros Sandoval on   
2022   
   
                                                    Albumin (Body fld)   
[Mass/Vol]      4.3 g/dL                        3.2-5.5         [facility]  
   
                                                    Cholesterol [Mass/volume] in  
 Serum or PlasmaOrdered By: Eros Sandoval on   
2022   
   
                                                    Cholesterol   
[Mass/Vol]      176 mg/dL                       140-200         [facility]  
   
                                        Comment on above:   Chol less than 200 m  
g/dl low risk  
Chol 201-239 mg/dl borderline risk  
Chol 240 mg/dl and greater high risk   
   
                                                    Cholesterol in LDL Calc [Mas  
s/Vol]Ordered By: Eros Sandoval on 2022   
   
                                                    Cholesterol in LDL   
[Mass/Vol]      109 mg/dL                       0-100           [facility]  
   
                                        Comment on above:   LDL ATP III CLASSIFI  
CATION  
LDL less than 100 mg/dL Optimal  
-129 mg/dL Near or above optimal  
-159 mg/dL Borderline high  
-189 mg/dL High  
LDL greater than 189 mg/dL Very high   
   
                                                    Cholesterol in VLDL Calc [Ma  
ss/Vol]Ordered By: Eros Sandoval on 2022   
   
                                                    Cholesterol in VLDL   
[Mass/Vol]      12 mg/dL                                        [facility]  
   
                                                    Creatinine and Glomerular fi  
ltration rate.predicted panel (S/P/Bld)Ordered By:   
Eros Sandoval on 2022   
   
                      Creatinine [Mass/Vol] 0.89 mg/dL            0.44-1.03  [facility]  
   
                                                    Estimated glomerular filtrat  
ion rate (GFR) non- AmericanOrdered By:   
Eros Sandoval on 2022   
   
                                                    GFR/1.73 sq   
M.predicted among   
non-blacks MDRD   
(S/P/Bld) [Vol   
rate/Area]      > 60 mL/Min                                     [facility]  
   
                                                    Globulin Calc (S) [Mass/Vol]  
Ordered By: Eros Sandoval on 2022   
   
                                                    Globulin (S)   
[Mass/Vol]      2.9 g/dL                                        [facility]  
   
                                                    Laboratory - Chemistry and C  
hemistry - challengeOrdered By: Eros Sandoval on   
2022   
   
                      Glucose [Mass/Vol] 84 mg/dL                   [facility]  
   
                                                    No Panel InformationOrdered   
By: Eros Sandoval on 2022   
   
                                                    Estimated GFR   
() > 60 mL/Min                                     [facility]  
   
                                        Comment on above:   GFR estimated refere  
nce range: According to KDOQI guidelines,   
<60 ml/min/1.73m2 is sufficient to diagnose a patient with   
chronic kidney disease.   
   
                                                    Pharmacy Creatinine   
Clearance (Chem N/A                                             [facility]  
   
                      Triglycerides Reflex 63 mg/dL                   [facility]  
   
                                        Comment on above:   TRIG ATP III CLASSIF  
ICATION  
TRIG less than 150 mg/dL Normal  
TRIG 150-199 mg/dL Borderline high  
TRIG 200-500 mg/dL High  
TRIG greater than 500 mg/dL Very high  
Standard traceable to the Center for Disease Conrtrol and   
Prevention (CDC) test method.   
   
                                                    Protein [Mass/volume] in Ser  
um or PlasmaOrdered By: Eros Sandoval on 2022   
   
                      Protein [Mass/Vol] 7.2 g/dL              6.1-7.9    [facility]  
   
                                                    Serum or plasma alanine amin  
otransferase measurement without P-5'-P (enzymatic   
activiOrdered By: Eros Sandoval on 2022   
   
                                                    ALT No additional   
P-5'-P [Catalytic   
activity/Vol]   14 U/L                          10-60           [facility]  
   
                                                    Serum or plasma albumin/glob  
ulin mass ratioOrdered By: Eros Sandoval on   
2022   
   
                                                    Albumin/Globulin   
[Mass ratio]    1.5 {ratio}                                     [facility]  
   
                                                    Serum or plasma alkaline kacey  
sphatase measurement (enzymatic   
activity/volume)Ordered   
By: Eros Sandoval on 2022   
   
                                                    ALP [Catalytic   
activity/Vol]   69 U/L                          32-92           [facility]  
   
                                                    Serum or plasma aspartate am  
inotransferase measurement (enzymatic   
activity/volume)Ordered By: Eros Sandoval on 2022   
   
                                                    AST [Catalytic   
activity/Vol]   18 U/L                          10-42           [facility]  
   
                                                    Serum or plasma calcium mason  
urement (mass/volume)Ordered By: Eros Sandoval on   
2022   
   
                      Calcium [Mass/Vol] 9.6 mg/dL             8.2-10.2   [facility]  
   
                                                    Serum or plasma chloride tho  
surement (moles/volume)Ordered By: Eros Sandoval on   
2022   
   
                      Chloride [Moles/Vol] 102 mmol/L                 [facility]  
   
                                                    Serum or plasma high density  
 lipoprotein (HDL) cholesterol measurementOrdered   
By:   
Eros Sandoval on 2022   
   
                                                    Cholesterol in HDL   
[Mass/Vol]      54 mg/dL                        35-85           [facility]  
   
                                        Comment on above:   HDL CHOL ATP-III CLA  
SSIFICATION  
Cardiovascular  
Risk  
HDL > or equal to 60 mg/dL LOW  
HDL < 40 mg/dL HIGH   
   
                                                    Serum or plasma potassium me  
asurement (moles/volume)Ordered By: Eros Sandoval on  
   
2022   
   
                      Potassium [Moles/Vol] 4.1 mmol/L            3.5-5.1    [facility]  
   
                                                    Serum or plasma sodium measu  
rement (moles/volume)Ordered By: Eros Sandoval on   
2022   
   
                      Sodium [Moles/Vol] 136 mmol/L            136-146    [facility]  
   
                                                    Serum or plasma total biliru  
bin measurement (mass/volume)Ordered By: Eros Sandoval on   
2022   
   
                      Bilirubin [Mass/Vol] 0.8 mg/dL             0.3-1.2    [facility]  
   
                                                    Serum or plasma total carbon  
 dioxide measurement (moles/volume)Ordered By:   
Eros Sandoval on 2022   
   
                      CO2 [Moles/Vol] 27.2 mmol/L            22.0-30.0  [facility]  
   
                                                    Serum or plasma total choles  
terol/high density lipoprotein (HDL) cholesterol   
mass   
ratOrdered By: Eros Sandoval on 2022   
   
                                                    Cholesterol.total/Cho  
lesterol in HDL [Mass   
ratio]          3.3 {ratio}                     <5.0            [facility]  
   
                                                    Serum or plasma urea nitroge  
n measurement (mass/volume)Ordered By: Eros Sandoval  
on   
2022   
   
                                                    Urea nitrogen   
[Mass/Vol]      5 mg/dL                                     [facility]  
   
                                                    Serum or plasma beta choriog  
onadotropin measurement (units/volume)Ordered By:   
RO DUFFY on 2022   
   
                      HCG.beta subunit Qn m[IU]/mL                         [facility]  
   
                                        Comment on above:   Approximate Approxim  
ate hCG  
Gestational Age Range (mIU/ml)  
(weeks)  
0.2-1 5-50  
1-2   
2-3 100-5,000  
3-4 500-10,000  
4-5 1,000-50,000  
5-6 10,000-100,000  
6-8 15,000-200,000  
8-12 10,000-100,000  
   
   
                                                    BRIEF OP NOTon 2022   
   
                                        BRIEF OP NOT        HNO ID: [id number]  
Author: Elbert Bucio MD  
Service: Radiology  
Author Type: Fellow  
Type: Brief Op Note  
Filed: 2022 11:53   
AM  
Note Text:  
BRIEF OPERATIVE /   
PROCEDURE NOTE  
LOG ID: [id number]  
SURGERY/PROCEDURE   
DATE: 2022  
INCISION/PROCEDURE   
START TIME: 11:25 AM  
INCISION   
CLOSE/PROCEDURE END   
TIME:  
SURGEON(S)/PROCEDURALI  
ST(S) AND   
ASSISTANT(S):  
Surgeon(s) and Role:  
* Lacho Mujica MD -   
Primary  
No Additional Staff  
SURGERY/PROCEDURE(S):   
CT guided left   
posterior iliac bone   
biopsy  
ANESTHESIA: Procedural   
Sedation  
FINDINGS: 2 core   
samples obtained  
ESTIMATED BLOOD LOSS:   
0 ml  
SPECIMENS: 2 cores  
COMPLICATIONS: None  
PRE-OP/PRE-PROCEDURE   
DIAGNOSIS:   
indeterminate bone   
lesion, hx of MSSA  
lumbar discitis  
POST-OP/POST-PROCEDURE   
DIAGNOSIS: Same as   
Preop  
SIGNATURE: Elbert Bucio MD PATIENT NAME:   
Shameka Figueredo  
DATE: May 26, 2022   
MRN: [id number]  
TIME: 11:52 AM      Normal                                  [facility]  
   
                                                    Bacteria Spec Anaerobe Culto  
n 2022   
   
                                                    Bacteria identified   
Anaer cx Nom (Unsp   
spec)           Negative        Normal                          [facility]  
   
                                        Comment on above:   Performed By: #### 6  
[id number] ####[facility]   
LABCLIA [id number] [place] [address]   
[place]   
   
                                                    Bacteria Tiss Culton   
022   
   
                                                    Bacteria identified   
Cx Nom (Tiss)                           CULTURE, TISSUE:  
No growth 3 days  
  
GRAM STAIN:  
No organisms seen  
No Polymorphonuclear   
Leukocytes          Normal                                  [facility]  
   
                                        Comment on above:   Performed By: #### 4  
[id number] ####[facility]   
LABCLIA [id number] [address] [id number], [place] [address]   
[place]   
   
                                                    CT BX RIB/PELV/REYES/SPINE P  
ROCon 2022   
   
                                                    CT BX   
RIB/PELV/REYES/SPINE   
PROC                                    * * *Final Report* * *  
DATE OF EXAM: May 26   
2022 11:49AM  
[facility]  - CT BX   
RIB/PELV/REYES/SPINE   
PROC / ACCESSION #   
[id number]  
PROCEDURE REASON: Bone   
lesion [M89.9]  
  
* * * * Physician   
Interpretation * * * *  
CT GUIDED LEFT ILIAC   
BONE LESION BIOPSY  
INDICATION: The   
patient is a 21 years   
year old Female who   
presented with  
Bone lesion [M89.9] .  
CONSENT: The risks,   
benefits, treatment   
options, potential   
complications  
and personnel to be   
involved were   
discussed (including   
the instruments to  
be used, contrast and   
anesthesia   
administration) with   
the patient. All  
questions were   
answered and consent   
was obtained. The   
patient indicated  
willingness to   
proceed.  
GENERAL:  
a) Medication   
Reconciliation: The   
patient's medications   
and allergies  
were reviewed in the   
electronic medical   
record and reconciled   
to the  
proposed   
procedure/treatment.  
Pre-procedure Sign-in:   
Safety Checklist   
Performed Yes  
b) Positioning: The   
patient was placed   
prone on the table.  
c) The area was then   
sterilely prepped and   
draped.  
d) Time Out: A time   
out was performed   
immediately prior to   
procedure  
start with the   
nursing, anesthesia   
and interventional   
team, correctly  
identifying the   
patient name, date of   
birth, procedure,   
anatomy  
(including marking of   
site and side),   
patient position,   
procedure consent  
form, relevant   
diagnostic and   
radiology test   
results, antibiotic  
administration, safety   
precautions, and   
procedure-specific   
equipment  
needs.  
Time Out: 1116  
Anesthesia Start Time:   
1116  
e) Anesthesia Type:   
moderate procedural   
sedation. Anesthesia   
was  
administered for a   
total of 30 minutes   
using 3.5 mg of Versed   
and 175 mcg  
of fentanyl. Local   
anesthesia: 12 mL 1%   
lidocaine.  
f) Patient monitoring:   
Performed by   
registered nurse.  
PROCEDURE:  
a) Procedure Details:   
The area was marked,   
prepped, and draped.   
After  
local anesthesia, an   
11-gauge needle was   
advanced into the left   
posterior  
iliac lesion via   
posterior medial   
approach, and 2 core   
needle samples  
were obtained.. All   
needles were removed.   
Images were stored.  
b) Devices used:  
Biopsy Needle: 11   
Gauge On Control  
c) Estimated Blood   
Loss: 0 mL  
d) Number and Type of   
Removed Specimens: 2   
core needle samples,   
one sent  
in saline for culture   
and Gram stain, and   
the second sent in   
formalin for  
surgical pathology.  
CONTRAST  
CT imaging was   
performed without   
contrast.  
RADIATION DOSE  
a) Image guidance: CT   
guidance  
b) Radiation:  
CT Radiation dose:   
Integrated Dose-length   
product (DLP) for this  
visit = 174 mGy*cm.  
CT Dose Reduction   
Employed: Automated   
exposure control (AEC)  
POST PROCEDURE:  
a) Hemostasis:   
Hemostasis was   
achieved using light   
manual  
compression.  
b) Sign-out:   
Communication   
Performed N/A  
c) Procedure End Time:   
1149  
d) Conclusion: The   
patient was   
transferred to the   
biopsy recovery  
room in stable   
condition.  
COMPLICATIONS:  
a) Significant Patient   
Complication: None  
b) Complications   
during the procedure:   
None  
RESULTS: 2 core needle   
samples obtained from   
the left posterior   
iliac  
lesion.  
IMPRESSION: SUCCESSFUL   
CT GUIDED LEFT   
POSTERIOR ILIAC LESION   
BIOPSY AS  
DESCRIBED.  
Attending Radiologist:   
Dr. Lacho Mujica MD  
Assistant: Elbert Bucio MD  
The procedure was   
performed by the   
assistant, and the   
attending  
radiologist personally   
supervised the entire   
procedure.  
: PSCB  
Transcribe Date/Time:   
May 26 2022 4:03P  
Dictated by :   
ELBERT BUCIO MD  
This examination was   
interpreted and the   
report reviewed and  
electronically signed   
by:  
LACHO MUJICA MD on   
May 26 2022 4:54PM EST  
[id number] Normal                                  [facility]  
   
                                                    HISTORY PHYSICALon   
2   
   
                                        HISTORY PHYSICAL    HNO ID: [id number]  
Author: Elbert Bucio MD  
Service: Radiology  
Author Type: Fellow  
Type: HANDP  
Filed: 2022 10:31   
AM  
Note Text:  
----------------------  
--------------  
Attestation signed by   
Lacho Mujica MD at   
2022 12:36 PM  
I saw and evaluated   
the patient. Discussed   
with the resident and   
agree with  
resident's findings   
and plan as documented   
in the resident's   
note.  
----------------------  
--------------  
UPDATED PROCEDURAL   
SEDATION  
HISTORY AND PHYSICAL   
EXAMINATION  
SERVICE DATE:   
2022  
SERVICE TIME: 1020  
PHYSICAL EXAM MUST BE   
COMPLETED ON ADMISSION  
PROCEDURE SCHEDULED:   
Procedure(s) with   
comments:  
BIOPSY MUSCLE,   
PERCUTANEOUS NEEDLE   
(N/A) - **CT** BIOPSY   
LEFT ILIAC BONE  
LESION / DR. QUINONEZ /   
LABS- PLT- WNL INR NOT   
NEEDED /  
RADIOLOGY ORDER   
PLACED:  
The History and   
Physical (completed in   
the past 30 days) has   
been reviewed  
and the patient has   
been examined. The   
contents accurately   
reflect the  
patient's condition   
with the following   
additions or revisions   
since the  
HANDP was completed.  
ASA Class: ASA Class::   
Patient with mild   
systemic disease  
Examination indicates   
no changes.  
AIRWAY: Airway   
Visualization of   
Uvula: Yes  
Mouth opening greater   
than 2 fingerbreadths:   
Yes  
Neck Full Range of   
Motion: Yes  
LUNGS:  
CARDIAC: ,  
Provisional   
Diagnosis/Treatment   
Plan: CT guided   
posterior left iliac   
bone  
lesion biopsy  
SEDATION GOAL:   
Moderate  
This HANDP can be   
found in the   
Electronic Medical   
Record dated 22.  
SIGNATURE: Elbert Bucio MD PATIENT NAME:   
Shameka Figueredo  
DATE: May 26, 2022   
MRN: [id number]  
TIME: 10:30 AM PAGER: Normal                                  [facility]  
   
                                                    PT EDon 2022   
   
                                        PT ED               HNO ID: [id number]  
Author: Nicki Hernandez RN  
Service: Nursing  
Author Type:   
Registered Nurse  
Type: Patient   
Education  
Filed: 2022 11:28   
AM  
Note Text:  
AMBULATORY PATIENT   
EDUCATION  
TOPIC: Left iliac bone   
biopsy  
READINESS TO LEARN  
COGNITIVE ABILITY:   
Alert and oriented  
MOTIVATION TO LEARN:   
Interested  
FAMILY SUPPORT: High -   
Very involved in pt   
care  
INSTRUCTION PROVIDED   
TO: Patient  
PATIENT LEARNS BEST   
BY: Individual   
Instruction  
FACTORS AFFECTING   
LEARNING: Unable to   
assess  
PHYSICAL LIMITATIONS   
AFFECTING LEARNING:   
Fatigue  
LEARNING RESPONSE  
METHOD OF INSTRUCTION:   
Individual instruction  
PATIENT / FAMILY   
RESPONSE: Information   
received as   
demonstrated by  
interest and questions  
FOLLOW-UP PLAN:   
Patient instructed to   
call with any further   
issues  
SUPPLEMENTAL MATERIAL:   
None  
REFERRAL   
(RECOMMENDATION): None  
Electronically Signed   
By: Nicki Hernandez RN In Department:   
ANGIO               Normal                                  [facility]  
   
                                                    SURGICAL PATHOLOGYon   
022   
   
                      CASE REPORT            Normal                [facility]  
   
                                        Comment on above:   Order Comment: Speci  
men Type: TISSUE SPECIMENOrdering   
Facility:   
[facility] Address: [address] [place] [address]   
   
                                                            Result Comment: Surg  
ical Pathology Report Case: [id number]  
Authorizing Provider: Lacho Mujica MD Collected: 2022   
11:58 AM  
Ordering Location: [facility]io Received: 2022 02:26 PM  
Pathologist: Fredi Lieberman MD  
Specimen: BONE BIOPSY   
   
                                                            Performed By: #### S  
 ####[facility] LABCLIA   
[id number] [address]   
   
                                        CLINICAL HISTORY    left posterior iliac  
   
bone lesion, hx of   
lumbar spine MSSA   
discitis            Normal                                  [facility]  
   
                                        Comment on above:   Order Comment: Speci  
men Type: TISSUE SPECIMENOrdering   
Facility:   
[facility] Address: [address] [place] [address]   
   
                                                            Performed By: #### S  
 ####[facility] LABIA   
[id number] [address]   
   
                      DIAGNOSIS COMMENT            Normal                [facility]  
   
                                        Comment on above:   Order Comment: Speci  
men Type: TISSUE SPECIMENOrdering   
Facility:   
[facility] Address: [address] [place] [address]   
   
                                                            Result Comment: The   
patient's history of lumbar spine MSSA   
discitis and a lesion in the left posterior iliac bone is noted.   
Histologic sections show woven and lamellar cancellous bone with   
edema, focal fibrosis and mild chronic inflammation composed of   
lymphocytes and scattered plasma cells. Focal hemosiderin   
deposition is also noted. The above findings are non-specific,   
but in the proper clinical context this could represent a   
resolving osteomyelitis. No acute inflammation or neoplastic   
process is identified. Immunohistochemical stains for CKA1/AE3,   
S100, CD10, and special stains for fungal and mycobacteria   
organisms (GMS and AFB) respectively, are all negative.  
Histologic sections were reviewed in conjunction with the   
imaging studies.  
Laboratory Developed Test (LDT) Disclaimer:  
Performance characteristics of immunohistochemical,   
immunofluorescent and chromogenic in-situ hybridization tests   
have been determined by the performing laboratory within   
[facility] Pathology and Laboratory   
[facility] [place] ([facility], [facility], [facility] or [facility]) in a manner   
consistent with CLIA requirements. One or more of these tests   
have not been cleared or approved by the FDA. [facility] is   
regulated under CLIA as qualified to perform high-complexity   
testing. These tests are used for clinical purposes. They should   
not be regarded as investigational or for research. Positive and   
negative controls stain appropriately.   
   
                                                            Performed By: #### S  
 ####[facility] LABIA   
[id number] [address]   
   
                      FINAL DIAGNOSIS            Normal                [facility]  
   
                                        Comment on above:   Order Comment: Speci  
men Type: TISSUE SPECIMENOrdering   
Facility:   
[facility] Address: [address] [place] [address]   
   
                                                            Result Comment: Bone  
 (left posterior iliac bone), biopsy:  
- Cancellous bone with edema, mild chronic inflammation, and   
focal fibrosis. See comment.  
JDR/ KAA 22  
Electronically signed by Fredi Lieberman MD on 2022 at 2:12 PM   
   
                                                            Performed By: #### S  
 ####[facility] LABIA   
[id number] [address]   
   
                      FINAL PERFORMING LAB            Normal                [facility]  
   
                                        Comment on above:   Order Comment: Speci  
men Type: TISSUE SPECIMENOrdering   
Facility:   
[facility] Address: [address] [place] [address]   
   
                                                            Result Comment: Diag  
nostic interpretation performed at [facility], [address] [place] [address] CLIA# [id number]  
: Lorenzo Villar M.D.   
   
                                                            Performed By: #### S  
 ####[facility] LABCLIA   
[id number] [address]   
   
                      GROSS DESCRIPTION A. BONE BIOPSY. Normal                Cl  
[facility]  
   
                                        Comment on above:   Order Comment: Speci  
men Type: TISSUE SPECIMENOrdering   
Facility:   
[facility] Address: [address] [place] [id number]   
   
                                                            Result Comment: Rece  
ived in formalin labeled as  bone biopsy  is   
a segment of cylindrical bone biopsy measuring 1.5 x 0.3 x 0.3   
cm. Entirely submitted in cassette A1 following decalcification.  
TN/tg 2022  
Gross examination performed at [facility], [address] [place], [place] [id number] CLIA# [id number]   
   
                                                            Performed By: #### S  
 ####[facility] LABCLIA   
[id number] [address] [place]   
   
                                                    NURSING PROGon 2022   
   
                                        NURSING PROG        HNO ID: [id number]  
Author: Madelin Veronica LPN  
Service: ?  
Author Type: LICENSED   
NURSE  
Type: Nursing Progress   
Note  
Filed: 2022 1:13   
PM  
Note Text:  
Pre- e instructions:  
Contacted patient and   
confirmed appt. for   
biopsy scheduled on   
22, at  
[facility].  
Diet:  
Do not eat solid food   
after midnight the   
night before your  
procedure.  
You may have water   
until your arrival   
time.  
Medications:  
IF ok with your   
Prescribing Provider:  
RADIOLOGY RECOMMENDS   
THESE MEDICATION   
RESTRICTIONS : None  
Medication pumps:  
Insulin pumps must be   
removed before   
entering the procedure   
room.  
Do you wear Neulasta   
Onpro? No  
If yes, the devise   
must be removed before   
entering the procedure   
room.  
Contrast Dye Prep:  
Do you have a contrast   
dye allergy? No  
Labs:  
Labs completed on   
22  
Arrival:  
Please bring your   
Photo ID and Insurance   
Card.  
A general consent may   
need to be signed.  
Arrival at 9:30am to   
desk [id number] ([facility]) and check in   
for  
your procedure.  
/Transportation:  
How will you be   
arriving for your   
procedure?  
Private car.  
If you will be   
arriving at [facility] via ambulance   
or  
public  
transportation, please   
call to discuss.  
You will need a   
responsible adult to   
accompany you to and   
from  
the procedure.  
Your  is   
required to stay with   
you until you are   
taken into the  
Procedure room.  
[facility] is   
currently restricting   
visitors to one   
visitor per  
patient. No visitor   
under the age of 16.  
You and your visitor   
will be screened for   
temperature and  
COVID-19 symptoms upon   
entry to the hospital,   
and a wristband will   
be  
applied when cleared.  
If you develop any of   
the following symptoms   
before your  
procedure, please call   
[phone].  
Chills, joint pain,   
rash, sore throat,   
cough, loss of smell,   
reddened  
eyes, vomiting,   
abdominal pains,   
diarrhea, loss of   
taste, severe   
headache,  
weakness, bruising or   
bleeding, fever,   
muscle pain, shortness   
of breath  
Recovery expectations:  
You can expect to be   
at the hospital for   
the majority of the   
day. Please  
do not schedule any   
other appointments the   
day of your procedure.  
Special concerns:  
Do you use CPAP or   
BPAP?  
No  
Written instructions   
provided to patient   
via NPRt  
If you have any   
questions please call   
[phone]        [facility] 2022   
   
                                        CNPN                Telephone (NIQ)  
----------------------  
--------------  
SHAMEKA FIGUEREDO ([id number])   
2000 F  
Date Time Provider   
Department  
22 SIMA QUINONEZ   
NIQ  
During your visit   
today, we recorded the   
following information   
about you:  
[facility]   
2022 10:33 AM   
Signed  
Pt attempted to   
schedule the biopsy   
but was told the   
office has to call to  
schedule because it   
needs to be triaged.  
Call appointment for   
[phone]  
Pt- [phone]  
Renee Naegele, RN   
2022 10:49 AM   
Signed  
Neuro SPINE CARE   
COORDINATION QUICK   
NOTE  
Spoke with scheduling,   
triage completed.  
Patient will be   
contacted by   
scheduling regarding   
appointment.  
Patient updated.  
Renee Naegele, RN  
Allergies As of Date:   
2022 Noted   
Allergy Reaction  
HYDROCODONE-ACETAMINOP  
HEN 2016 1 -   
Mental Status Change  
Date Reviewed:   
2022  
Reviewed by: Fabiola Reardon RN - Fully   
Assessed  
Reason for Visit:  
biospy [Other]  
Prescriptions as of   
2022  
- blood sugar   
diagnostic (FREESTYLE   
LITE STRIPS) test   
strip  
TEST BLOOD SUGARS 2   
TIMES DAILY  
- lancets (FREESTYLE   
LANCETS) 28 gauge  
1 Each twice daily.  
- metFORMIN ER   
(GLUCOPHAGE XR) 750 mg   
24 hr tablet  
Take 1 tablet by mouth   
daily with breakfast.  
- ARIPiprazole   
(ABILIFY) 15 mg tablet  
Take 1 tablet by mouth   
once daily.  
- sertraline (ZOLOFT)   
100 mg tablet  
Take 1.5 tablets by   
mouth once daily.  
- traZODone (DESYREL)   
50 mg tablet  
Take 1 tablet by mouth   
daily at bedtime.  
- venlafaxine ER   
(EFFEXOR XR) 75 mg 24   
hr capsule  
Take 1 capsule by   
mouth once daily.  
- Cranberry 400 mg cap  
Take 1 capsule by   
mouth once daily.  
Problem List As Of   
Date 2022 Noted   
Resolved  
Diet controlled   
gestational diabetes   
mellitus (*2019   
12/10/2019  
Normal delivery [O80]   
2019  
Night sweats [R61]   
2019  
Diarrhea [R19.7]   
2019  
Headache [R51.9]   
2019  
Reactive hypoglycemia   
[E16.1] 12/10/2019  
History of gestational   
diabetes mellitus   
(GDM) *12/10/2019  
Encounter Number:   
[id number]  
Encounter   
Status:Closed by   
NAEGELE, RENEE on   
22             Normal                                  [facility]                Telephone (SPNMMN)  
----------------------  
--------------  
SHAMEKA FIGUEREDO ([id number])   
2000 F  
Date Time Provider   
Department  
22 SIMA QUINONEZ  
During your visit   
today, we recorded the   
following information   
about you:  
Renee Naegele, RN   
2022 9:45 AM   
Signed  
Neuro SPINE CARE   
COORDINATION QUICK   
NOTE  
Kush K Goyal, MD Renee Naegele, RN  
Please let patient   
know that I reviewed   
imaging with Dr Morales   
in Ortho  
He is happy to see   
patient in person  
He also recommends a   
biopsy of Left iliac   
mass biopsy on   
2022, 2022,  
2022  
?  
Those are the date he   
is in clinic at   
[facility] on .   
We can also arrange  
to see her in spine   
medicine clinic with   
Thompson BOYD on same   
day  
Patient can call   
[phone] to   
schedule biopsy at   
[facility]  
Advised per Dr. Quinonez's message.  
Patient verbalized   
understanding with   
intent to comply.  
Encouraged to call   
with any further   
questions/concerns.  
Information also   
included in a my chart   
message per patient's   
request.  
Renee Naegele, RN  
Allergies As of Date:   
2022 Noted   
Allergy Reaction  
HYDROCODONE-ACETAMINOP  
HEN 2016 1 -   
Mental Status Change  
Date Reviewed:   
2022  
Reviewed by: Fabiola Reardon RN - Fully   
Assessed  
Reason for Visit:  
Results [95]  
Follow Up [171]  
Prescriptions as of   
2022  
- blood sugar   
diagnostic (FREESTYLE   
LITE STRIPS) test   
strip  
TEST BLOOD SUGARS 2   
TIMES DAILY  
- lancets (FREESTYLE   
LANCETS) 28 gauge  
1 Each twice daily.  
- metFORMIN ER   
(GLUCOPHAGE XR) 750 mg   
24 hr tablet  
Take 1 tablet by mouth   
daily with breakfast.  
- ARIPiprazole   
(ABILIFY) 15 mg tablet  
Take 1 tablet by mouth   
once daily.  
- sertraline (ZOLOFT)   
100 mg tablet  
Take 1.5 tablets by   
mouth once daily.  
- traZODone (DESYREL)   
50 mg tablet  
Take 1 tablet by mouth   
daily at bedtime.  
- venlafaxine ER   
(EFFEXOR XR) 75 mg 24   
hr capsule  
Take 1 capsule by   
mouth once daily.  
- Cranberry 400 mg cap  
Take 1 capsule by   
mouth once daily.  
Problem List As Of   
Date 2022 Noted   
Resolved  
Diet controlled   
gestational diabetes   
mellitus (*2019   
12/10/2019  
Normal delivery [O80]   
2019  
Night sweats [R61]   
2019  
Diarrhea [R19.7]   
2019  
Headache [R51.9]   
2019  
Reactive hypoglycemia   
[E16.1] 12/10/2019  
History of gestational   
diabetes mellitus   
(GDM) *12/10/2019  
Encounter Number:   
[id number]  
Encounter   
Status:Closed by   
NAEGELE, RENEE on   
22             Normal                                  [facility]                Telephone (BIOPMN)  
----------------------  
--------------  
MATILDESHAMEKA BACA ([id number])   
2000 F  
Date Time Provider   
Department  
22 SIMA QUINONEZ   
BIOPMN  
During your visit   
today, we recorded the   
following information   
about you:  
Yodit Jno   
2022 10:48 AM   
Signed  
RADIOLOGY CALL CENTER   
INTAKE  
: Yodit Olsen EXT:   
[id number]  
DATE: 22 TIME:   
10:46am TRACKING #.   
0000  
REQUESTING PERSON:   
Sima Quinonez MD   
PHONE/PAGER:   
[phone]  
REQUESTING STAFF:   
Barbara PHONE/PAGER:   
[phone]  
SPECIFICS OF THE   
REQUEST: (Please be as   
detailed as possible.   
If request is  
lymph node biopsy,   
specify LOCATION of   
the node if possible):   
Imaging guided  
biopsy soft tissue   
mass/muscle  
(For example: ?biopsy   
liver mass? or ?biopsy   
pelvic lymph node?)  
SPECIAL REQUESTS:   
TISSUE SAMPLE,   
LABWORK: N/A  
-Fine needle   
aspiration (FNA), core   
biopsy, no preference,  
unsure, specific   
processing request for   
pathology  
(For example: ?send   
for ER, NE, HER2/jodi?   
or ?possible lymphoma   
send in RPMI  
solution?)  
IS THIS REQUEST PART   
OF A RESEARCH   
PROTOCOL: No  
IF YES: List specifics   
of request and   
name/contact number of  
research coordinator   
and primary physician.  
MEDICAL DIAGNOSIS:   
Bone lesion M89.9  
(For example: ?history   
of breast cancer with   
liver mass? or   
?history of  
lymphoma?)  
TYPE AND DATE OF THE   
EXAM THAT IS THE BASIS   
OF THE REQUEST: MRI   
Date: 22  
(Note: Requests for   
 random  organ   
biopsies, specifically   
liver and kidney  
random biopsies do not   
need imaging.  
ALL OTHER CASES NEED   
IMAGING TO EVALUATE   
APPROPRIATENESS/FEASIB  
ILITY OF THE  
REQUEST)  
[facility]  
(If the imaging was   
obtained outside the   
[facility] system, then it   
needs to be  
submitted for review   
prior to approval.)  
Note to all persons   
requesting biopsies:   
All biopsy requests   
will be scheduled  
as quickly as   
possible, based on the   
clinical urgency,   
availability of  
appointment times, the   
need to hold   
anti-thrombolytic   
therapy (aspirin,   
blood  
thinners) and the   
patient?s schedule,   
including the need for   
an available  
. If a   
percutaneous biopsy or   
drainage is not felt   
to be safe or an  
alternative method for   
establishing a   
diagnosis is possible,   
this will be  
discussed directly   
with the requesting   
physician.  
Madelin Veronica LPN   
2022 11:48 AM   
Signed  
BX. COORDINATOR   
INFORMATION  
LAB RESULTS:  
PT INR (no units)  
Date Value  
2019 1.0  
APTT (sec)  
Date Value  
2019 29.2  
Platelet Count (k/uL)  
Date Value  
2022 237  
Current Outpatient   
Medications  
Medication Sig  
- blood sugar   
diagnostic (FREESTYLE   
LITE STRIPS) test   
strip TEST BLOOD   
SUGARS 2  
TIMES DAILY  
- lancets (FREESTYLE   
LANCETS) 28 gauge 1   
Each twice daily.  
- metFORMIN ER   
(GLUCOPHAGE XR) 750 mg   
24 hr tablet Take 1   
tablet by mouth daily  
with breakfast.  
- ARIPiprazole   
(ABILIFY) 15 mg tablet   
Take 1 tablet by mouth   
once daily.  
- sertraline (ZOLOFT)   
100 mg tablet Take 1.5   
tablets by mouth once   
daily.  
- traZODone (DESYREL)   
50 mg tablet Take 1   
tablet by mouth daily   
at bedtime.  
- venlafaxine ER   
(EFFEXOR XR) 75 mg 24   
hr capsule Take 1   
capsule by mouth once  
daily.  
- Cranberry 400 mg cap   
Take 1 capsule by   
mouth once daily.  
No current   
facility-administered   
medications for this   
visit.  
ALLERGIES  
Allergen Reactions  
- Hydrocodone-Acetami*   
Mental Status Change  
FILMS SENT TO   
WORKSTATION:  
GUIDELINES FOR HOLDING   
ANTI-PLATELET AND   
ANTI- COAGULATION   
THERAPY: none  
on file  
NURSE SIGNATURE: Madelin Veronica LPN  
DATE: May 16, 2022  
TIME: 11:48 AM  
Shelly Merino DO   
2022 4:33 PM   
Signed  
RADIOLOGIST REQUEST /   
APPROVAL FORM  
STAFF RADIOLOGIST:Dr Byrne  
PROCEDURE TO BE DONE   
UNDER: CT  
PROCEDURE REQUESTED:   
CORE Requested  
PROCEDURE: Approved  
TIME SLOT NEEDED: 1   
Hour  
NOTES: left bone   
lesion/posterior iliac   
spine; refer to MRI   
pelvis 22.  
Lesion is   
indeterminate although   
signal characteristics   
most suggestive of  
abscess given history   
or prior MSSA   
discitis.  
SPECIAL LABS/   
PROCESSING: None  
Pre-procedure labs:  
CBC: not needed  
INR: not needed  
COVID: not needed  
SIR Bleeding risk   
category for this   
procedure: low low   
risk.  
Reference from [facility]   
:  
https://ccf.[employer]ifinity  
.com/dotNet/documents/  
?ejaeo=22049  
STAFF SIGNATURE:   
Shelly Merino DO  
DATE: May 16, 2022  
TIME: 4:31 PM  
Lesvia Rocha 2022   
10:56 AM Signed  
Spoke to pt and   
scheduled biopsy for   
22.  
Allergies As of Date:   
2022 Noted   
Allergy Reaction  
HYDROCODONE-ACETAMINOP  
HEN 2016 1 -   
Mental Status Change  
Date Reviewed:   
2022  
Reviewed by: Fabiola Reardon RN - Fully   
Assessed  
Reason for Visit:  
Biopsy Request [1576]  
Prescriptions as of   
2022  
- blood sugar   
diagnostic (FREESTYLE   
LITE STRIPS) test   
strip  
TEST BLOOD SUGARS 2   
TIMES DAILY  
- lancets (FREESTYLE   
LANCETS) 28 gauge  
1 Each twice daily.  
- metFORMIN ER   
(GLUCOPHAGE XR) 750 mg   
24 hr tablet  
Take 1 tablet by mouth   
daily with breakfast.  
- ARIPiprazole   
(ABILIFY) 15 mg tablet  
(more content not   
included)...        Normal                                  [facility] 2022   
   
                                        ALLIED HEALTH       HNO ID: [id number]  
Author: Silas Pop RT(R)  
Service: ?  
Author Type:   
Technologist  
Type: Allied Health  
Filed: 2022 8:44   
AM  
Note Text:  
Radiology Service   
Progress Note  
PATIENT NAME: Shameka Figueredo  
MRN: [id number]  
DATE OF SERVICE: May   
9, 2022  
TIME: 8:44 AM  
PATIENT IDENTITY   
VERIFICATION COMPLETED   
USING TWO (2)   
IDENTIFIERS: Name  
and Date of Birth   
confirmed by patient   
verbally.  
FALL SCREENING: Has   
the patient had 2   
falls in the last year   
or 1 fall  
with injury or   
currently using an   
Ambulatory Assistive   
Device (Walker,  
Cane, Wheelchair,   
Crutches, etc.)? No  
PATIENT GENDER DATA:   
Female. Pregnancy   
status: Pregnant: No  
Breastfeeding status:   
N/A  
PATIENT RELEVANT   
IMPLANT DATA REVIEWED:   
Not Applicable  
RADIOLOGY DEPARTMENT:   
MR; Exam(s) Completed:   
Lower MSK: Pelvis,   
bilateral  
PERIPHERAL IV DATA:   
Not applicable  
SIGNED BY: SADI/   
Silas Pop, RT(R)  
May 9, 2022 8:44 AM Normal                                  [facility]  
   
                                                    HCG Preg Ur Qlon 2022   
   
                                                    HCG (pregnancy test)   
Ql (U)          Negative        Normal          Negative        [facility]  
   
                                        Comment on above:   Order Comment: Speci  
men Type: URINE SPECIMEN  
Ordering Facility: [facility]  
Address: [address] [place], [place] [id number]   
   
                                                            Result Comment: This  
 test is intended to aid in the early   
detection of pregnancy. Very dilute urine samples, as indicated   
by a low specific gravity, may not contain representative levels   
of hCG. This test detects intact hCG only. This test does not   
reliably detect hCG degradation products, including free-beta   
subunit and beta-core fragment. Therefore, this test may show   
reduced reactivity in urine after 8 weeks gestation. A number of   
conditions other than pregnancy, including trophoblastic disease   
and certain non-trophoblastic neoplasms cause elevated levels of   
hCG. As with any assay employing mouse antibodies, the   
possibility exists for interference by human anti-mouse   
antibodies (HAMA) in the specimen. The test provides a   
presumptive diagnosis for pregnancy.   
   
                                                            Performed By: #### 2  
106-3 ####  
[facility] LABORATORY  
CLIA [id number]  
[address] [place] [address]   
   
                                                    HISTORY PHYSICALon   
2   
   
                                        HISTORY PHYSICAL    HNO ID: [id number]  
Author: Sima Quinonez MD  
Service: ?  
Author Type: Physician  
Type: HANDP  
Filed: 2022 10:29   
AM  
Note Text:  
UPDATED HISTORY AND   
PHYSICAL EXAMINATION  
PATIENT NAME: Shameka Figueredo  
MRN: [id number]  
SERVICE DATE: 2022  
PHYSICAL EXAM MUST BE   
COMPLETED ON ADMISSION  
History: This is a 21   
year old female who   
presents with back   
pain more on  
right.  
No interval change in   
PMHX, PSHX, Allergies,   
FamHx, or ROS since   
visit  
2/10/2022 by Thompson BOYD.  
Physical Exam:  
Cardiovascular System:   
RRR without murmur,   
gallop, or rubs.  
Respiratory System:   
Lungs clear to   
auscultation. No   
wheezing or rhonchi.  
Airway Assessment: ASA   
Class: II  
HEENT: open mouth   
fully  
Cervical ROM: Flexion:   
full, Extension: full  
Neurological Manual   
Muscle Testing:  
Upper Extremities:   
Biceps: 5, Triceps: 5,   
Wrist Extension: 5  
Lower Extremities:   
Dorsiflexion: 5,   
Plantar: 5, EHL: 5  
Risk, benefits, and   
alternatives of   
surgery explained to   
patient by  
surgeon with explicit   
agreement by patient   
or patient   
representative  
before surgery.  
SIGNATURE: Sima Quinonez MD  
DATE: May 9, 2022  
TIME: 10:29 AM      [facility]  
   
                                                    MRI PELVIS ORTHO GEN WO IVCO  
Non 2022   
   
                                                    MRI PELVIS ORTHO GEN   
WO IVCON                                * * *Final Report* * *  
DATE OF EXAM: May 9   
2022 9:25AM  
LUM 0229 - MRI PELVIS   
ORTHO GEN WO IVCON /   
ACCESSION # [id number]  
PROCEDURE REASON:   
multiple diagnoses  
  
* * * * Physician   
Interpretation * * * *  
History: . Disorder of   
bone Bone lesion .  
Does patient need   
anesthesia or   
anxiolysis:->No   
anesthesia or   
anxiolysis  
needed  
Technique: Routine MRI   
of the pelvis and both   
hips ;  
Comparison: MRI   
2022; CT   
10/18/2021; imported   
MRI 10/21/2021;  
radiographs 02/10/2022  
Result:  
BONE MARROW: Posterior   
iliac spine on the   
iliac side of the left  
sacroiliac joint, the   
ovoid lesion   
previously seen on   
2022 is again  
noted. This measures   
about 20 mm in   
greatest dimension and   
appears to  
abut the articular   
surface of the SI   
joint. The size is   
unchanged from  
the prior study.   
Signal characteristics   
are somewhat different   
however.  
Internally there is   
more peripheral T1   
hyperintensity and   
lower central  
T1 signal than on the   
prior study. This is   
predominantly STIR  
hyperintense with some   
heterogeneity. The   
previously seen   
perilesional  
low T1 signal is no   
longer present or at   
least significantly   
reduced.  
This appears to have a   
peripheral hypointense   
margin.  
HIP JOINTS:  
Right hip: Large field   
of view images of this   
joint limits  
evaluation of   
articular structures.   
No gross abnormality  
Left hip: Large field   
of view images of this   
joint limits   
evaluation  
of articular   
structures. No gross   
abnormality  
SI JOINTS: Normal   
appearing sacroiliac   
joints bilaterally.  
TENDONS: The rectus   
femoris tendons,   
iliopsoas tendons,   
hamstring  
tendons, hip adductor   
and abductor tendons   
appear to be intact  
bilaterally.  
MUSCLE: Muscle bulk   
and signal intensity   
are within normal   
limits.  
NERVES: The visualized   
portions of the   
lumbosacral plexus and   
sciatic  
nerves appear to be   
within normal limits.  
VISCERAL PELVIS:   
Limited evaluation of   
the visceral pelvis is  
unremarkable.  
OTHER: No other   
significant   
abnormality   
identified.  
IMPRESSION:  
INDETERMINATE BONE   
LESION NEAR THE LEFT   
POSTERIOR ILIAC SPINE   
WITH  
EVOLVING INTERNAL AND   
PERILESIONAL SIGNAL   
CHARACTERISTICS. GIVEN   
THE  
APPEARANCE OVER TIME   
AND THE HISTORY OF   
PRIOR MSSA DISCITIS,   
AN  
INTRAOSSEOUS ABSCESS   
IS A LEADING   
CONSIDERATION. MOST   
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  
IS LANGERHANS' CELL   
HISTOCYTOSIS.  
: MILAN  
Transcribe Date/Time:   
May 9 2022 9:28A  
Dictated by : HENRRY WIN MD  
This examination was   
interpreted and the   
report reviewed and  
electronically signed   
by:  
HENRRY WIN MD on   
May 9 2022 9:55AM EST  
[id number] Normal                                  [facility]  
   
                                                    MRI PELVIS ORTHO GENERAL WO   
IVCONon 2022   
   
                                                                  [facility]  
   
                                                    OPERATIVE NOon 2022   
   
                                        OPERATIVE NO        HNO ID: [id number]  
Author: Sima Quinonez MD  
Service: ?  
Author Type: Physician  
Type: Operative Report  
Filed: 2022 10:54   
AM  
Note Text:  
OPERATIVE/PROCEDURE   
REPORT  
LOG ID: [id number]  
Surgery/Procedure   
Date: 2022  
Surgeon: Sima Quinonez MD  
Assistant: Jonathan Mendoza DO  
Procedure(s):Operation  
:right Sacro-Iliac   
Joint(s)  
Pre-Op/Pre-Procedure   
Diagnosis:   
Sacroiliitis  
Post-Op Diagnosis:   
same  
Anesthesia: Procedural   
Sedation  
2mg of IV versed was   
used with 6 min of   
intraservice   
monitoring time.  
Fluoroscopy time: 27.0   
sec  
Time In: 10:43 am  
Time out: 10:49 am  
Estimated Blood Loss:   
None  
Specimens: None  
Drains: None  
Complications: None  
INDICATIONS: The   
patient has been   
referred by my   
colleague Thompson BOYD*  
with concordant   
subjective, objective,   
and radiologic   
findings of  
Sacroiliitis, referred   
for diagnostic and   
therapeutic right   
Sacro-Iliac  
Joint injection(s)   
with failure of prior   
conservative care with   
physical  
therapy and   
medications alone. At   
this time, the patient   
wishes to avoid  
surgery. This is the   
patient's 1st   
injection under my   
care.  
-treated for L3-4   
discitis 10/2021  
PROCEDURE: After   
obtaining both verbal   
and written informed   
consent, the  
patient was placed in   
a prone position on   
the fluoroscopic table   
in  
[facility]   
procedure room, the   
patient'sposterior   
lumbosacral spine  
was prepped and draped   
in usual sterile   
fashion using iodine.   
The patient  
was connected to   
noninvasive blood   
pressure, EKG, pulse   
oximetry  
monitoring, and   
monitored by a   
registered   
interventional nurse   
throughout  
the procedure. Before   
initiating procedure,   
all relevant   
information was  
verified in a   
 time-out.   
One Skin wheal(s) were   
raised using 1%   
epinephrine with   
preservative-free  
lidocaine near the   
inferior portion of   
the right Sacro-iliac   
joint (s).  
Through the skin wheal   
a 22-gauge, 3-1/2-inch   
curved Quincke-tip   
spinal  
needle was inserted   
and advanced under   
direct fluoroscopic   
visualization  
in the AP and lateral   
planes, until the   
needle tip entered the   
right  
Sacro-iliac Joint.   
Proper needle   
placement was   
confirmed with 0.2 cc   
of  
Omnipaque-180M   
nonionic contrast   
confirming   
intra-articular flow   
of  
contrast without any   
intravascular uptake   
of contrast seen under   
direct  
fluoroscopic   
visualization in the   
AP, ipsilateral   
oblique, and lateral  
planes. At this point   
40mg Kenalog and 1 cc   
of 0.75% preservative-   
free  
bupivacaine were   
infused into the   
Sacro-Iliac Joint(s).   
Adequate  
hemostasis was   
obtained at the needle   
puncture site. The   
patient's back  
was cleaned and a   
sterile dressing was   
applied. The patient   
was taken  
conscious and in   
stable condition to   
the recovery room. No   
complications  
as a result of this   
procedure. Post   
procedure precautions   
and instructions  
were reviewed with the   
patient who verbalized   
understanding.  
I/primary   
surgeon/proceduralist   
performed the   
procedure with   
assistance.  
Significant Findings:   
2 degrees ipsi   
oblique. concordant.  
Pre-Op Pain: 2.  
Post-Op Pain: 0.  
The patient had 2/5   
positive provocative   
test on physical exam   
prior to  
injection.  
The patient had no   
pain with the same   
provocative test after   
injection.  
Care Instructions:   
Discharge per   
protocol.  
Medications: See Epic   
medication section  
Appointment: Patient   
to return 4 weeks to   
clinic with pain   
diary.  
Discharge Condition:   
Good condition for   
discharge.  
Patient discharged   
home when all   
discharge criterion   
met.  
Sima Quinonez MD  
Staff Physician  
[facility]   
Health  
SIGNATURE: Sima Quinonez MD PATIENT NAME: Shameka Figueredo  
DATE: May 9, 2022 MRN:   
[id number]  
TIME: 10:51 AM   
PAGER/CONTACT #:    [facility] 2022   
   
                                        [facility]                Telephone (SPNMMN)  
----------------------  
--------------  
SHAMEKA FIGUEREDO ([id number])   
2000 F  
Date Time Provider   
Department  
3/23/22 ESTELLE MACKAY SPNMMN  
During your visit   
today, we recorded the   
following information   
about you:  
[facility]   
3/23/2022 3:59 PM   
Signed  
Received outside   
imaging/report:  
CD Yes  
Report Yes  
Imaging received:  
22: MRI Lumbar   
Spine W / WO Contrast  
10/21/21: MRI Lumbar   
Spine W / WO Contrast  
10/18/21: CT Lumbar   
Spine WO Contrast  
Imaging uploaded and   
forward to team for   
review.  
[facility]   
3/24/2022 10:45 AM   
Signed  
Patient called;   
confirmed that imaging   
had been rec'd and   
uploaded  
successfully; patient   
is requesting call   
back upon review by   
provider; ph.  
[phone]  
Allergies As of Date:   
2022 Noted   
Allergy Reaction  
HYDROCODONE-ACETAMINOP  
HEN 2016 1 -   
Mental Status Change  
Date Reviewed:   
02/10/2022  
Reviewed by: Desiree Lang - Fully   
Assessed  
Reason for Visit:  
External Imaging   
[Other]  
Prescriptions as of   
2022  
- blood sugar   
diagnostic (FREESTYLE   
LITE STRIPS) test   
strip  
TEST BLOOD SUGARS 2   
TIMES DAILY  
- lancets (FREESTYLE   
LANCETS) 28 gauge  
1 Each twice daily.  
- metFORMIN ER   
(GLUCOPHAGE XR) 750 mg   
24 hr tablet  
Take 1 tablet by mouth   
daily with breakfast.  
- ARIPiprazole   
(ABILIFY) 15 mg tablet  
Take 1 tablet by mouth   
once daily.  
- sertraline (ZOLOFT)   
100 mg tablet  
Take 1.5 tablets by   
mouth once daily.  
- traZODone (DESYREL)   
50 mg tablet  
Take 1 tablet by mouth   
daily at bedtime.  
- venlafaxine ER   
(EFFEXOR XR) 75 mg 24   
hr capsule  
Take 1 capsule by   
mouth once daily.  
- Cranberry 400 mg cap  
Take 1 capsule by   
mouth once daily.  
Problem List As Of   
Date 2022 Noted   
Resolved  
Diet controlled   
gestational diabetes   
mellitus (*2019   
12/10/2019  
Normal delivery [O80]   
2019  
Night sweats [R61]   
2019  
Diarrhea [R19.7]   
2019  
Headache [R51.9]   
2019  
Reactive hypoglycemia   
[E16.1] 12/10/2019  
History of gestational   
diabetes mellitus   
(GDM) *12/10/2019  
Encounter Number:   
[id number]  
Encounter   
Status:Closed by   
JESSICA JUÁREZ on 3/23/22  [facility]  
   
                                                    CNTHERAPYon 2022   
   
                                        CNTHERAPY           OT/PT/Speech Visit   
(PHYTMN)  
----------------------  
--------------  
SHAMEKA FIGUEREDO ([id number])   
2000 F  
Date Time Provider   
Department  
3/2/22 10:00 AM   
STEW CHAUDH[facility]  
Encounter Number:   
[id number]  
Date Time Provider   
Department Center  
3/2/2022 10:00 AM   
[id number]  
Reason for Visit:  
PT Eval [747]  
Physical Therapy [503]  
Visit Diagnoses:Pain   
of right sacroiliac   
joint [M53.3]  
Chronic bilateral low   
back pain without   
sciatica [M54.50,  
G89.29]  
Allergies As of Date:   
2022 Noted   
Allergy Reaction  
HYDROCODONE-ACETAMINOP  
HEN 2016 1 -   
Mental Status Change  
Date Reviewed:   
02/10/2022  
Reviewed by: Desiree Lang - Fully   
Assessed  
Prescriptions as of   
2022  
- diclofenac, EC,   
(VOLTAREN) 75 mg EC   
tablet  
Take 1 tablet by mouth   
twice daily as needed   
(for pain.).  
- blood sugar   
diagnostic (FREESTYLE   
LITE STRIPS) test   
strip  
TEST BLOOD SUGARS 2   
TIMES DAILY  
- lancets (FREESTYLE   
LANCETS) 28 gauge  
1 Each twice daily.  
- metFORMIN ER   
(GLUCOPHAGE XR) 750 mg   
24 hr tablet  
Take 1 tablet by mouth   
daily with breakfast.  
- ARIPiprazole   
(ABILIFY) 15 mg tablet  
Take 1 tablet by mouth   
once daily.  
- sertraline (ZOLOFT)   
100 mg tablet  
Take 1.5 tablets by   
mouth once daily.  
- traZODone (DESYREL)   
50 mg tablet  
Take 1 tablet by mouth   
daily at bedtime.  
- venlafaxine ER   
(EFFEXOR XR) 75 mg 24   
hr capsule  
Take 1 capsule by   
mouth once daily.  
- Cranberry 400 mg cap  
Take 1 capsule by   
mouth once daily.  
----------------------  
--------------  
Letter Text         Normal                                  [facility]  
   
                                                    CNOVon 02-   
   
                                        CNOV                Office Visit (SPMEFV  
)  
----------------------  
--------------  
SHAMEKA FIGUEREDO ([id number])   
2000 F  
Date Time Provider   
Department  
2/10/22 1:50 PM   
EST[facility] MACKAY   
[facility]  
During your visit   
today, we recorded the   
following information   
about you:  
Temperature Pulse   
Blood pressure Weight  
97.3 degrees 83/minute   
126/61 75.8 kg  
Height  
1.727 m  
Estelle Mackay,   
APRN.CNP 2022   
2:47 PM Signed  
Sp[facility] Low   
Back Pain - Chronic (>   
12 weeks) Initial Exam  
SUBJECTIVE  
HISTORY OF PRESENT   
ILLNESS:  
Shameka Figueredo is a 21   
year old female who   
presents with a chief   
complaint of low  
back pain and is seen   
in consultation   
requested by self  
Patient presents with   
chronic back pain   
starting May 2021. No   
accident/injury.  
Had imaging done 2021 with abnormal   
findings at L3. Biopsy   
done in  
October at local   
hospital.  
Followed by infectious   
disease. Was on IV abx   
starting Oct- end   
November.  
After antibiotics   
symptoms improved.   
Felt 85-90% improved.  
Pain returned in the   
last few weeks.  
Went to her local ER   
on 22 d/t pain.   
Was transfered to [facility] per her  
request. Neurosurgeon   
following her advised   
her there was nothing   
else to do,  
no infection noted and   
dx with chronic low   
back pain.  
Went to [facility] ER   
yesterday, 22.   
ESR, CRP WNL. MRIs w   
contrast were done of  
the spine. Seen by   
Neurosurgery who did   
not recommend any   
intervention.  
Pain localized to   
right low back/buttock   
and mid lumbar spine  
Pain described as   
Aching  
Radiation: right   
buttock  
Numbness/Tingling:   
feet tingling -   
intermittent  
Pain worse with   
constant, movement  
Pain improved with   
heat  
Interventions: heat,   
meds  
Medications:   
gabapentin 300mg TID   
(no help), cymbalta,   
advil prn Previously:  
percocet  
Physical Therapy: May-   
 at [facility]  
History of Spine   
Injections/Surgery:   
None  
Other Issues Addressed   
at the Visit Today:   
None.  
Precipitating Event:   
None  
PAIN EVALUATION  
2/10/2022  
1323  
Pain Level: 7  
Pain Location:   
Back-Lower  
right hip  
Description: Aching  
Duration Units:   
Unknown  
Frequency: Continuous  
Intervention/Comfort   
measure: Medication  
Litigation: No  
Workers' Compensation:   
No  
YELLOW AND BLUE FLAGS  
No-Neg Attitude; Back   
Pain is Disabling  
No-Avoiding Activity   
(for Fear of Pain)  
YES-Depression or   
Anxiety Disorders  
No-Social Problems  
No-Substance Use   
Disorder  
No-Job Dissatisfaction  
No-Financial   
Disincentives  
Patient Entered   
Questionnaires  
PROMIS Score   
Percentiles  
Percentiles provide an   
indication of how the   
patient's score ranks   
in relation  
to the general   
population. Higher   
percentile rankings   
indicate better  
function/quality of   
life. 50th percentile   
is the average of the   
general  
population and   
indicates half of   
respondents had a   
worse score.  
Depression Screening:  
PHQ-9 Self-Harm (Item   
9) response options:  
0 Not at all  
1 Several days  
2 More than half the   
days  
3 Nearly every day  
PHQ-9 Levels:  
0-4 No - mild   
depression  
5-9 Mild depression  
10-14 Moderate   
depression  
15-19 Moderately   
severe depression  
20-27 Severe   
depression  
ACTIVE PROBLEM LIST  
Normal Delivery  
Night Sweats  
Diarrhea  
Headache  
Reactive Hypoglycemia  
History of Gestational   
Diabetes Mellitus   
(Gdm)  
PAST MEDICAL HISTORY  
Diagnosis Date  
- Depression  
- Gestational diabetes  
- H/O pre-term labor  
- Post partum   
depression  
PAST SURGICAL HISTORY  
Procedure Laterality   
Date  
- KNEE ARTHROSCOPY  
- REMOVAL GALLBLADDER  
Social History  
Tobacco Use  
- Smoking status:   
Never Smoker  
- Smokeless tobacco:   
Never Used  
Vaping Use  
- Vaping Use: Never   
used  
Substance Use Topics  
- Alcohol use: Not   
Currently  
- Drug use: Never  
FAMILY HISTORY  
Problem Relation Age   
of Onset  
- Hypertension   
Maternal Grandmother  
- Hyperlipidemia   
Maternal Grandfather  
- Hypertension   
Maternal Grandfather  
- Heart Maternal   
Grandfather  
- other (Lung Cancer)   
Maternal Grandfather  
ALLERGIES  
Allergen Reactions  
- Hydrocodone-Acetami*   
Mental Status Change  
CURRENT MEDICATIONS:  
blood sugar diagnostic   
(FREESTYLE LITE   
STRIPS) test strip   
TEST BLOOD SUGARS 2  
TIMES DAILY  
lancets (FREESTYLE   
LANCETS) 28 gauge 1   
Each twice daily.  
metFORMIN ER   
(GLUCOPHAGE XR) 750 mg   
24 hr tablet Take 1   
tablet by mouth daily  
with breakfast.  
ARIPiprazole (ABILIFY)   
15 mg tablet Take 1   
tablet by mouth once   
daily.  
sertraline (ZOLOFT)   
100 mg tablet Take 1.5   
tablets by mouth once   
daily.  
traZODone (DESYREL) 50   
mg tablet Take 1   
tablet by mouth daily   
at bedtime.  
venlafaxine ER   
(EFFEXOR XR) 75 mg 24   
hr capsule Take 1   
capsule by mouth once  
daily.  
Cranberry 400 mg cap   
Take 1 capsule by   
mouth once daily.  
REVIEW OF SYSTEMS:  
PAIN ASSESSMENT: See   
HPI.  
GENERAL: Denies fever,   
chills malaise and   
weight loss.  
HEENT: No recent   
change in vision or   
hearing.  
CARDIOVASCULAR: Denies   
chest pain, history of   
A-fib, valvular   
disease, or  
pacemaker/ICD.  
RESPIRATORY: Denies   
SOB, sputum   
production, and   
hemoptysis.  
GI: Denies GI ulcers,   
inflammatory (more   
content not   
included)...        Normal                                  [facility]  
   
                                                    CONSULTon 02-   
   
                                        CONSULT             HNO ID: [id number]  
Author: Say Watkins MD  
Service: Neurosurgery  
Author Type: Resident  
Type: Consults  
Filed: 2/10/2022 4:20   
AM  
Note Text:  
NEUROSURGERY CONSULT   
HISTORY AND PHYSICAL   
EXAMINATION  
PLEASE DO NOT REMOVE   
FROM THE CHART OR   
MODIFY PRINTED COPY  
Patient Name: Shameka Figueredo  
MRN: [id number]  
CONSULTED BY: ED  
CONSULTED FOR:   
?discitis  
CHIEF COMPLAINT:   
Second opinion  
HPI: 21 year old   
female with PMHx for   
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   
(treated with 6  
weeks of abx in   
2021), presenting to   
ED with symptoms of   
lumbar back  
pain, walking   
difficulties, numbness   
tingling in her feet   
with MRI showing  
indeterminate lesion   
in the left iliac wing   
along the   
posterior-medial  
margin of the left SI   
joint for which NSGY   
spine is consulted.  
Patient was diagnosed   
with osteomyelitis of   
L3 vertebrae last year   
via  
bone biopsy which grew   
MSSA in setting of   
history of progressive   
back pain  
since 2021. Patient   
was placed on 6 weeks   
of abx which completed   
in  
2021. Patient   
reported good symptom   
relief for ~1.5 months   
and then had  
recurrence of previous   
back pain ~2022   
which has gotten   
progressively  
worse. Also reports   
tingling in BLE at the   
bottoms of the feet   
and into  
the calf. Also reports   
some difficulties   
initiating urination,   
but no  
concerns for urine or   
stool incontinence.   
Denies BLE weakness,   
but has  
difficulty ambulating   
2/2 pain.  
Patient recently   
admitted to OSH on   
2022. Obtained CT   
L scan on  
22 showing   
interval increase in   
two lucent lesion at L   
3 and iliac  
bone c/f possible   
discitis, but cannot   
exclude langerhands   
histiocystosis.  
Evaluated by NSGY at   
the OSH who said she   
did not have an   
infection and  
stated that she had   
chronic back pain.   
Patient discharged on   
 and then  
presented to [facility] ED on   
 for a second   
opinion.  
In ED, patient is   
afebrile, vitals   
stable. No   
leukocytosis or   
elevated  
ESR/CRP. MRI T and L   
spine obtained showing   
indeterminate lesion   
in the  
left iliac wing along   
the posterior-medial   
margin of the left SI   
joint.  
?Signal   
characteristics   
suggest partially   
fluid contents and   
there is some  
enhancement after   
gadolinium and likely   
at least some edema in   
the  
surrounding bone. ?No   
clear evidence for   
contiguous extension   
into the  
adjacent soft tissues.   
?The intrinsic   
internal contents and   
presence of  
enhancement does raise   
question of possible   
inflammation or   
infection  
locally. Also focal   
endplate defect   
inferior L3 represents   
a Schmorl's  
node.  
Anti-platelets/anti-co  
agulants: None  
PAST MEDICAL HISTORY:  
PAST MEDICAL HISTORY  
Diagnosis Date  
- Depression  
- Gestational diabetes  
- H/O pre-term labor  
- Post partum   
depression  
PAST SURGICAL HISTORY:  
PAST SURGICAL HISTORY  
Procedure Laterality   
Date  
- KNEE ARTHROSCOPY  
- REMOVAL GALLBLADDER  
FAMILY HISTORY:  
FAMILY HISTORY  
Problem Relation Age   
of Onset  
- Hypertension   
Maternal Grandmother  
- Hyperlipidemia   
Maternal Grandfather  
- Hypertension   
Maternal Grandfather  
- Heart Maternal   
Grandfather  
- other (Lung Cancer)   
Maternal Grandfather  
SOCIAL HISTORY:  
Social History  
Tobacco Use  
- Smoking status:   
Never Smoker  
- Smokeless tobacco:   
Never Used  
Vaping Use  
- Vaping Use: Never   
used  
Substance Use Topics  
- Alcohol use: Not   
Currently  
- Drug use: Never  
MEDICATIONS:  
blood sugar diagnostic   
(FREESTYLE LITE   
STRIPS) test strip   
TEST BLOOD  
SUGARS 2 TIMES DAILY  
lancets (FREESTYLE   
LANCETS) 28 gauge 1   
Each twice daily.  
metFORMIN ER   
(GLUCOPHAGE XR) 750 mg   
24 hr tablet Take 1   
tablet by mouth  
daily with breakfast.  
ARIPiprazole (ABILIFY)   
15 mg tablet Take 1   
tablet by mouth once   
daily.  
sertraline (ZOLOFT)   
100 mg tablet Take 1.5   
tablets by mouth once   
daily.  
traZODone (DESYREL) 50   
mg tablet Take 1   
tablet by mouth daily   
at bedtime.  
venlafaxine ER   
(EFFEXOR XR) 75 mg 24   
hr capsule Take 1   
capsule by mouth  
once daily.  
Cranberry 400 mg cap   
Take 1 capsule by   
mouth once daily.  
Current   
Facility-Administered   
Medications  
Medication Dose Route   
Frequency  
- iv contrast   
(radiology procedure)   
INTRAVENOUS AS   
DIRECTED PRN  
- iv contrast   
(radiology procedure)   
INTRAVENOUS AS   
DIRECTED PRN  
ALLERGIES:  
ALLERGIES  
Allergen Reactions  
- Hydrocodone-Acetami*   
Mental Status Change  
COMPLETE REVIEW OF   
SYSTEMS: See HPI  
PHYSICAL EXAM:  
NAD, AAO x 3  
CN grossly intact  
Strength:  
RUE: D 5 Bi 5 Tri 5 HG   
5 HI 5  
LUE: D 5 Bi 5 Tri 5 HG   
5 HI 5  
RLE: HF 5 KE 5 DF 5   
EHL 5 PF 5  
LLE: HF 5 KE 5 DF 5   
EHL 5 PF 5  
SILT in all dermatomes  
Hoffmans neg  
Clonus neg  
Reflexes: 1+ bilateral   
patellars  
Gait + Rectal tone:   
deferred  
DATA:  
Radiology:  
See HPI  
Laboratory:  
CBC, Coags, BMP, Mg,   
Phos  
Recent Labs  
22  
1540  
WBC 5.64  
HB 11.8  
HCT 35.1*  
  
  
K 3.9  
CHLOR 104  
CO2 24  
BUN 8  
CREAT 0.70  
GLUC 102*  
CA 9.2  
MG 1.9  
ASSESSMENT AND PLAN:   
21 year old female   
with PMHx for MSSA   
discitis @ L3-4  
(treated with 6 weeks   
of abx in 2021),   
presenting to ED with   
symptoms of  
lumbar back pain,   
walking difficulties   
(more content not   
included)...        Normal                                  [facility]  
   
                                                    ED NOTEon 02-   
   
                                        ED NOTE             HNO ID: [id number]  
Author: Peter Ayala MD  
Service: Emergency   
Medicine  
Author Type: Resident  
Type: ED Notes  
Filed: 2/10/2022 6:22   
AM  
Note Text:  
DR for DG 10:09 PM  
21 year old female   
with hx discitis    
p/w worsening symptoms   
similar to  
previous discitis.   
Difficulty ambulating,   
paresthesias,   
difficulty  
voiding.  
[ ] MRI  
[ ] spine consult  
ED Course as of   
02/10/22 0620  
Peter CAMILO Tenisha Ayala's   
Documentation  
u Feb 10, 2022  
0115 Spoke with   
Neurosurgery, no   
indication for   
surgical intervention,  
recommending   
outpatient follow up   
with spine surgery as   
outpatient  
Others' Documentation  
Wed 2022  
172 CBC + DIFF(!):  
WBC 5.64  
RBC 4.02  
Hemoglobin 11.8  
Hematocrit 35.1(!)  
MCV 87.3  
MCH 29.4  
MCHC 33.6  
RDW-CV 12.0  
Platelet Count 237  
MPV 9.8  
Neut% 69.0  
Abs Neut (ANC) 3.87  
Lymph% 20.0  
Abs Lymph 1.13  
Mono% 7.3  
Abs Mono 0.41  
Eosin% 3.0  
Abs Eosin 0.17  
Baso% 0.7  
Abs Baso 0.04  
Nucleated Reds 0.0  
Absolute nRBC <0.01  
Diff Type Auto Diff  
No anemia,   
leukocytosis, or   
thrombocytopenia. [DG]  
1720 Magnesium: 1.9  
Within normal limits   
[DG]  
1720 BASIC METABOLIC   
PNL(!):  
Glucose 102(!)  
BUN 8  
Creatinine 0.70  
Sodium 139  
Potassium 3.9  
Chloride 104  
CO2 24  
Anion Gap 11  
Calcium 9.2  
eGFR-African American   
>60  
eGFR-All Other Races   
>60  
No clinically   
significant   
electrolyte   
abnormalities or MACARIO.   
[DG]  
 I evaluated the   
patient and personally   
participated in the   
key  
components. I agree   
with the resident's   
findings and plan as   
documented  
and have discussed the   
case and management of   
the patient's care   
with the  
resident.  
21 year old female   
with previous history   
of discitis, here with   
recurrent  
symptoms of back pain   
and paresthesias which   
happened with her last  
infection. Alert,   
afebrile and   
hemodynamically stable   
with adequate  
oxygenation. Exam   
shows no focal   
strength deficit. MRI  
lumbar/thoracic spine   
pending.  
Signature: Shannan Hernandez MD  
Date: 2022  
Time: 8:24 PM  
[VL]  
 Pregnancy   
Urine-ED(POC):   
Negative [DG]  
 CRP: 0.3  
Within normal limits   
[DG]  
 WSR: 13  
Within normal limits   
[DG]  
 Care endorsed to   
me by Dr. Hernandez - 22yo   
female with discitis   
treated  
with copat. LS.   
Resolved. Has   
developed same   
symptoms again.   
Underwent  
imaging local area -   
was seen by OSH   
neurosurg. Here for   
2nd opinion.  
Exam ok - can   
ambulate, no   
hyperreflexia. MRI T   
and L spine. [HJ]  
2218 MRI without   
discitis [HJ]  
2258 Spoke with   
neurosurgery resident   
who agrees to evaluate   
patient. [DG]  
ED Course User Index  
[DG] Sukhi Byrne MD  
[HJ] Elena Powell MD  
[VL] Shannan Hernandez MD  
Clinical Impressions   
as of 02/10/22 0620  
Back pain, unspecified   
back location,   
unspecified back pain   
laterality,  
unspecified chronicity  
Paresthesias  
Under my care:  
?Patient remained   
hemodynamically stable   
without new   
complaints.  
?MRI resulted without   
recurrence of   
discitis.  
?Spinal surgery   
consulted and   
evaluated the patient.   
Found no indication  
for intervention at   
this time, recommended   
outpatient follow-up   
with  
spinal medicine.  
?Patient updated on   
results and plan of   
care. Agreeable to   
plan. Stable  
for discharge home.  
Peter Belle MD Normal                                  [facility]  
   
                                                    XR LUMBAR 2V AP/LATon 02-10-  
2022   
   
                                        XR LUMBAR 2V AP/LAT * * *Final Report* *  
 *  
DATE OF EXAM: Feb 10   
2022 4:04PM  
FVX [id number] - XR LUMBAR   
2V AP/LAT / ACCESSION   
# [id number]  
PROCEDURE REASON:   
multiple diagnoses  
  
* * * * Physician   
Interpretation * * * *  
Clinical: Back pain  
Technique:3 views of   
the lumbar spine  
RESULT: No evidence of   
acute fracture or   
subluxation is seen.   
There is no  
evidence for   
spondylolysis or   
spondylolisthesis. The   
vertebral body  
height and discs   
spaces demonstrates   
disc space narrowing   
at L3/L4 and  
L4/L5 as well as   
L5/S1. The alignment   
demonstrates moderate   
levoscoliosis  
IMPRESSION:  
Levoscoliosis and mild   
degenerative disc   
disease in the lower   
lumbar  
spine.  
: PSCB  
Transcribe Date/Time:   
Feb 10 2022 4:46P  
Dictated by : ARMAND ARCOS MD  
This examination was   
interpreted and the   
report reviewed and  
electronically signed   
by:  
ARMAND ARCOS MD on   
Feb 10 2022 4:49PM EST  
[id number] Normal                                  [facility]  
   
                                                    XR PELVIS 3V AP/INLET/OUTLET  
on 02-   
   
                                                    XR PELVIS 3V   
AP/INLET/OUTLET                         * * *Final Report* * *  
DATE OF EXAM: Feb 10   
2022 4:04PM  
FVX [id number] - XR PELVIS   
3V AP/INLET/OUTLET /   
ACCESSION # [id number]  
PROCEDURE REASON:   
multiple diagnoses  
  
* * * * Physician   
Interpretation * * * *  
HISTORY: UNKNOWN PAIN.   
Pain of right   
sacroiliac joint   
Chronic  
bilateral low back   
pain without sciatica   
Chronic bilateral low   
back pain  
without sciatica .  
TECHNIQUE: XR PELVIS   
3V AP/INLET/OUTLET  
Laterality: NOT   
APPLICABLE  
Number of different   
views (projections): 2  
COMPARISON: None  
RESULT:  
SI joints appear   
normal. Pelvic ring is   
intact. Hip joints   
appear  
preserved. Pubic   
symphysis is normal.   
Limited views of the   
lumbar spine  
appear unremarkable.  
IMPRESSION:  
No findings of   
sacroiliitis. No   
significant   
degenerative change at   
the  
SI joints.  
: MILAN  
Transcribe Date/Time:   
2022 8:09A  
Dictated by : LUIS GARCIA MD  
This examination was   
interpreted and the   
report reviewed and  
electronically signed   
by:  
LUIS GARCIA MD on   
2022 8:16AM EST  
[id number] Normal                                  [facility]  
   
                                                    ALLIED HEALTHon 2022   
   
                                        ALLIED HEALTH       HNO ID: [id number]  
Author: SKYE Mabry)  
Service: Radiology  
Author Type:   
Technologist  
Type: Allied Health  
Filed: 2022 9:39   
PM  
Note Text:  
Radiology Service   
Progress Note  
PATIENT NAME: Shameka Figueredo  
MRN: [id number]  
DATE OF SERVICE:   
2022  
TIME: 9:38 PM  
PATIENT IDENTITY   
VERIFICATION COMPLETED   
USING TWO (2)   
IDENTIFIERS: Name  
and Date of Birth   
confirmed by patient   
verbally and Name and   
Date of Birth  
confirmed by   
identification band.  
FALL SCREENING: Has   
the patient had 2   
falls in the last year   
or 1 fall  
with injury or   
currently using an   
Ambulatory Assistive   
Device (Walker,  
Cane, Wheelchair,   
Crutches, etc.)?   
Emergency Room   
Patient: Screened in   
ED  
PATIENT GENDER DATA:   
Female. Pregnancy   
status: Pregnant: No  
Breastfeeding status:   
NO.  
PATIENT RELEVANT   
IMPLANT DATA REVIEWED:   
Yes  
RADIOLOGY DEPARTMENT:   
MR; Exam(s) Completed:   
Spine: Thoracic spine   
and  
Lumbar spine  
PERIPHERAL IV DATA:   
Inpatient: see LDA   
documentation  
SIGNED BY: RT Clotilde(PHUONG)  
2022 9:38   
PM                  Normal                                  [facility]  
   
                                                    Basic Metabolic Panlon    
   
                      Anion gap [Moles/Vol] 11 mmol/L  Normal     9-18       [facility]  
   
                                        Comment on above:   Performed By: #### B  
SE MG1, CBCDIF ####[facility] [place], [place] [phone]   
   
                      Calcium [Mass/Vol] 9.2 mg/dL  Normal     8.5-10.2   [facility]  
   
                                        Comment on above:   Performed By: #### B  
SE, MG1, CBCDIF ####[facility] [place] [phone]   
   
                      Chloride [Moles/Vol] 104 mmol/L Normal          [facility]  
   
                                        Comment on above:   Performed By: #### B  
SE MG1, CBCDIF ####[place] [address] [place] [phone]   
   
                      CO2 [Moles/Vol] 24 mmol/L  Normal     22-30      [facility]  
   
                                        Comment on above:   Performed By: #### B  
SE, MG1, CBCDIF ####[place] [address] [place], [place] [phone]   
   
                      Creatinine [Mass/Vol] 0.70 mg/dL Normal     0.58-0.96  [facility]  
   
                                        Comment on above:   Performed By: #### B  
SE, MG1, CBCDIF ####[facility] [place], [place] [phone]   
   
                      eGFR- Amer. >60        Normal                [facility]  
   
                                        Comment on above:   Performed By: #### B  
SE MG1, CBCDIF ####[place] [address], [place] [phone]   
   
                      eGFR-All Other Races >60        Normal                [facility]  
   
                                        Comment on above:   Result Comment: eGFR  
 (Estimated GFR) Units of measure:   
mL/min/1.73 meters squared  
eGFR is derived from the reexpressed MDRD Study equation using   
the following parameters: serum creatinine, age, gender and   
race. The creatinine assay has been calibrated to be traceable   
to IDMS.  
An eGFR <60 mL/min/1.73m2 for >3 months is consistent with   
chronic kidney disease. Refer to KDOQI guidelines for clinical   
interpretation.  
In patients with unstable renal function, e.g. those with acute   
kidney injury, the eGFR may not accurately reflect actual GFR.  
Note: On 2022, the eGFR calculation will be updated to the   
NKF-ASN Task Force recommended  CKD-EPI creatinine equation   
which does not include a race variable. For more information or   
to access a  CKD-EPI calculator, visit the National Kidney   
Foundation website at   
kidney.org/professionals/kdoqi/gfr_calculator.   
   
                                                            Performed By: #### B  
MP, MG1, CBCDIF ####[facility] [place], [place] [phone]   
   
                      Glucose [Mass/Vol] 102 mg/dL  High       74-99      [facility]  
   
                                        Comment on above:   Result Comment: The   
American Diabetes Association (ADA)   
provides   
guidance for cutoff values for fasting glucose and random   
glucose. The ADA defines fasting as no caloric intake for at   
least 8 hours. Fasting plasma glucose results between 100 to 125   
mg/dL indicate increased risk for diabetes (prediabetes).  
Fasting plasma glucose results greater than or equal to 126   
mg/dL meet the criteria for diagnosis of diabetes. In the   
absence of unequivocal hyperglycemia, results should be   
confirmed by repeat testing. In a patient with classic symptoms   
of hyperglycemia or hyperglycemic crisis, random plasma glucose   
results greater than or equal to 200 mg/dL meet the criteria for   
diagnosis of diabetes.  
Reference: Standards of Medical Care in Diabetes 2016, American   
Diabetes Association. Diabetes Care. 2016.39(Suppl 1).   
   
                                                            Performed By: #### B  
MP, MG1, CBCDIF ####[facility]   
[address] [place], [place] [phone]   
   
                      Potassium [Moles/Vol] 3.9 mmol/L Normal     3.7-5.1    [facility]  
   
                                        Comment on above:   Performed By: #### B  
MP, MG1, CBCDIF ####[facility]   
[address], [place] [phone]   
   
                      Sodium [Moles/Vol] 139 mmol/L Normal     136-144    [facility]  
   
                                        Comment on above:   Performed By: #### B  
MP, MG1, CBCDIF ####[place] [address] [place] [phone]   
   
                                                    Urea nitrogen   
[Mass/Vol]      8 mg/dL         Normal          7-21            [facility]  
   
                                        Comment on above:   Performed By: #### B  
MP, MG1, CBCDIF ####[place] [address] [place] [phone]   
   
                                                    C-Reactive Proteinon   
022   
   
                      C-Reactive Protein 0.3 mg/dL  Normal     <0.9       [facility]  
   
                                        Comment on above:   Performed By: #### W  
SR, CRP ####[place] [phone]   
   
                                                    CBC and Differentialon    
   
                      Abs Baso   0.04 k/uL  Normal     <0.11      [facility]  
   
                                        Comment on above:   Performed By: #### B  
MP, MG1, CBCDIF ####[place] [address] [address] [place] [phone]   
   
                      Abs Mono   0.41 k/uL  Normal     <0.87      [facility]  
   
                                        Comment on above:   Performed By: #### B  
MP, MG1, CBCDIF ####[address] [place] [phone]   
   
                      Abs Neut   3.87 k/uL  Normal     1.45-7.50  [facility]  
   
                                        Comment on above:   Performed By: #### B  
MP, MG1, CBCDIF ####[place] [address], [place] [phone]   
   
                      Absolute nRBC <0.01      Normal     <0.01      [facility]  
   
                                        Comment on above:   Performed By: #### B  
MP, MG1, CBCDIF ####[place] [address] [place] [phone]   
   
                                                    Basophils/100 WBC   
(Bld)           0.7 %           Normal                          [facility]  
   
                                        Comment on above:   Performed By: #### B  
MP, MG1, CBCDIF ####[facility] [place] [phone]   
   
                      DTYPE      Auto Diff  Normal                [facility]  
   
                                        Comment on above:   Performed By: #### B  
MP, MG1, CBCDIF ####[place] [address] [address] [phone]   
   
                                                    Eosinophils (Bld)   
[#/Vol]         0.17 10*3/uL    Normal          <0.46           [facility]  
   
                                        Comment on above:   Performed By: #### B  
MP, MG1, CBCDIF ####[place] [address] [place] [phone]   
   
                                                    Eosinophils/100 WBC   
(Bld)           3.0 %           Normal                          [facility]  
   
                                        Comment on above:   Performed By: #### B  
MP, MG1, CBCDIF ####[place] [address] [phone]   
   
                                                    Erythrocyte   
distribution width   
(RBC) [Ratio]   12.0 %          Normal          11.5-15.0       [facility]  
   
                                        Comment on above:   Performed By: #### B  
MP, MG1, CBCDIF ####[place] [address] [place] [phone]   
   
                                                    Hematocrit (Bld)   
[Volume fraction] 35.1 %          Low             36.0-46.0       [facility]  
   
                                        Comment on above:   Performed By: #### B  
MP, MG1, CBCDIF ####[facility]   
[address] [place] [phone]   
   
                                                    Hemoglobin (Bld)   
[Mass/Vol]      11.8 g/dL       Normal          11.5-15.5       [facility]  
   
                                        Comment on above:   Performed By: #### B  
MP, MG1, CBCDIF ####[facility] [place] [phone]   
   
                                                    Lymphocytes (Bld)   
[#/Vol]         1.13 10*3/uL    Normal          1.00-4.00       [facility]  
   
                                        Comment on above:   Performed By: #### B  
MP, MG1, CBCDIF ####[facility], [place] [phone]   
   
                                                    Lymphocytes/100 WBC   
(Bld)           20.0 %          Normal                          [facility]  
   
                                        Comment on above:   Performed By: #### B  
MP, MG1, CBCDIF ####[place] [address] [place] [phone]   
   
                      MCH        29.4 pG    Normal     26.0-34.0  [facility]  
   
                                        Comment on above:   Performed By: #### B  
MP, MG1, CBCDIF ####[facility] [place] [phone]   
   
                      MCHC (RBC) [Mass/Vol] 33.6 g/dL  Normal     30.5-36.0  [facility]  
   
                                        Comment on above:   Performed By: #### B  
MP, MG1, CBCDIF ####[facility] [phone]   
   
                                                    MCV (RBC) [Entitic   
vol]            87.3 fL         Normal          80.0-100.0      [facility]  
   
                                        Comment on above:   Performed By: #### B  
MP, MG1, CBCDIF ####[facility] [place] [phone]   
   
                                                    Monocytes/100 WBC   
(Bld)           7.3 %           Normal                          [facility]  
   
                                        Comment on above:   Performed By: #### B  
MP, MG1, CBCDIF ####[facility] [phone]   
   
                                                    Neutrophils/100 WBC   
(Bld)           69.0 %          Normal                          [facility]  
   
                                        Comment on above:   Performed By: #### B  
MP, MG1, CBCDIF ####[facility] [phone]   
   
                      NRBCs      0.0 /100 WBC Normal     0          [facility]  
   
                                        Comment on above:   Performed By: #### B  
SE MG1, CBCDIF ####[place] [address], [place] [phone]   
   
                                                    Platelet mean volume   
(Bld) [Entitic vol] 9.8 fL          Normal          9.0-12.7        [facility]  
   
                                        Comment on above:   Performed By: #### B  
MP, MG1, CBCDIF ####[address] [place], [place] [phone]   
   
                                                    Platelets (Bld)   
[#/Vol]         237 10*3/uL     Normal          150-400         [facility]  
   
                                        Comment on above:   Performed By: #### B  
SE, MG1, CBCDIF ####[address] [place], [place] [phone]   
   
                      RBC (Bld) [#/Vol] 4.02 10*6/uL Normal     3.90-5.20  [facility]  
   
                                        Comment on above:   Performed By: #### B  
MP, MG1, CBCDIF ####[address] [place], [place] [phone]   
   
                      WBC (Bld) [#/Vol] 5.64 10*3/uL Normal     3.70-11.00 [facility]  
   
                                        Comment on above:   Performed By: #### B  
SE, MG1, CBCDIF ####[address] [place], [place] [phone]   
   
                                                    ED PROV NOTEon 2022   
   
                                        ED PROV NOTE        HNO ID: [id number]  
Author: Shannan Hernandez MD  
Service: Emergency   
Medicine  
Author Type: Physician  
Type: ED Provider   
Notes  
Filed: 2022 4:13   
PM  
Note Text:  
ED Provider Note  
Patient Name: Shameka Figueredo  
MRN: [id number]  
SERVICE DATE: 22  
History  
Patient presents with:  
Second Opinion: Pt   
states he is here for   
a second opinion for   
two lesions  
on her back she has   
had since last May. Pt   
states she ha a   
neurosurgon  
that did not want to   
do anything else. Pt   
states Pt states her   
back pain  
is a 9/10, Pt states   
she has a spinal   
infection back in   
October. Pt states  
her feet are  tingly ,   
pt also reports she is   
dizzy.  
Back Pain  
HPI  
Shameka Figueredo is a 21   
year old female with   
PMH discitis   
presenting with  
request for second   
opinion. She states   
that she had discitis   
in May of  
last year and   
underwent a course of   
IV antibiotics through   
a PICC line in  
her arm. She states   
that she had symptoms   
of low back pain,   
difficulty  
ambulating, urinary   
retention, and   
tingling in her feet   
at that time. She  
states after   
completing the   
antibiotics the   
symptoms did improve,   
however  
over the last several   
months they have   
reoccurred and have   
been worsening.  
She states that she   
was seen at a hospital   
in her hometown and   
had CT and  
MR imaging of her   
spine performed. She   
states there are 2   
lesions seen on  
the imaging. She   
states she was   
transferred to another   
hospital in the  
region to see her   
previous neurosurgeon   
and he told her there   
is nothing  
more to do. She states   
she followed up with   
infectious disease who   
do not  
feel that her symptoms   
are related to another   
case of discitis but   
they  
did recommend finding   
a second opinion with   
another neurosurgeon.   
She  
presents today for   
this. Current symptoms   
include lumbar back   
pain, right  
hip pain, and tingling   
in both feet. She   
notes that she   
additionally  
develops   
lightheadedness when   
she stands up which   
has been present for  
several months. She   
states that she has to   
push and strain to   
urinate as  
well. She feels that   
she is weak in both   
legs and has   
difficulty walking.  
PAST MEDICAL HISTORY  
Diagnosis Date  
- Depression  
- Gestational diabetes  
- H/O pre-term labor  
- Post partum   
depression  
PAST SURGICAL HISTORY  
Procedure Laterality   
Date  
- KNEE ARTHROSCOPY  
- REMOVAL GALLBLADDER  
FAMILY HISTORY  
Problem Relation Age   
of Onset  
- Hypertension   
Maternal Grandmother  
- Hyperlipidemia   
Maternal Grandfather  
- Hypertension   
Maternal Grandfather  
- Heart Maternal   
Grandfather  
- other (Lung Cancer)   
Maternal Grandfather  
Social History  
Tobacco Use  
- Smoking status:   
Never Smoker  
- Smokeless tobacco:   
Never Used  
Vaping Use  
- Vaping Use: Never   
used  
Substance and Sexual   
Activity  
- Alcohol use: Not   
Currently  
- Drug use: Never  
- Sexual activity: Yes  
Partners: Male  
ALLERGIES  
Allergen Reactions  
- Hydrocodone-Acetami*   
Mental Status Change  
Review of Systems  
Constitutional:   
Negative for chills   
and fever.  
HENT: Negative for   
sore throat.  
Eyes: Negative for   
visual disturbance.  
Respiratory: Negative   
for cough and   
shortness of breath.  
Cardiovascular:   
Negative for chest   
pain and leg swelling.  
Gastrointestinal:   
Negative for abdominal   
pain, constipation,   
diarrhea and  
vomiting.  
Genitourinary:   
Negative for dysuria.  
Musculoskeletal:   
Negative for neck   
stiffness.  
Skin: Negative for   
rash.  
Neurological: Positive   
for weakness. Negative   
for syncope.  
Physical Exam  
/81   Pulse 113   
  Temp (Src) 97.7   
(Oral)   Resp 18   Ht   
5' 8   
(1.73m)   Wt 165 lb   
(74.8kg)   SpO2 99%     
LMP 10/23/2019   BMI   
25.09  
kg/(m2).  
O2 Therapy: Room Air  
Physical Exam  
Vitals and nursing   
note reviewed.  
Constitutional:  
General: She is not in   
acute distress.  
HENT:  
Head: Atraumatic.  
Nose: No rhinorrhea.  
Mouth/Throat:  
Mouth: Mucous   
membranes are moist.  
Pharynx: Oropharynx is   
clear.  
Eyes:  
Extraocular Movements:   
Extraocular movements   
intact.  
Cardiovascular:  
Rate and Rhythm:   
Regular rhythm.   
Tachycardia present.  
Pulses: Normal pulses.  
Pulmonary:  
Effort: Pulmonary   
effort is normal. No   
respiratory distress.  
Breath sounds: Normal   
breath sounds.  
Abdominal:  
General: There is no   
distension.  
Palpations: Abdomen is   
soft.  
Musculoskeletal:  
General: No deformity.  
Cervical back: Neck   
supple.  
Right lower leg: No   
edema.  
Left lower leg: No   
edema.  
Skin:  
General: Skin is warm   
and dry.  
Neurological:  
Mental Status: She is   
alert.  
Comments: Alert and   
oriented x3. Strength   
and sensation grossly   
intact  
to all 4 extremities.   
Patellar reflexes   
normal. Gait is   
abnormal with  
short steps and   
patient hunched over,   
appears antalgic.  
Diagnostic Testing  
ED Labs Ordered and   
Reviewed  
BASIC METABOLIC PNL -   
Abnormal; Notable for   
the following   
components:  
Result Value Ref Range  
Glucose 102 (*) 74 -   
99 mg/dL  
All other components   
within normal limits  
CBC + DIFF - Abnormal;   
Notable for the   
following components:  
Hematocrit 35.1 (*)   
36.0 - 46.0 %  
All other components   
within normal limits  
H (more content not   
included)...        Normal                                  [facility]  
   
                                                    MRI LUMBAR SPINE WO/W IVCONo  
n 2022   
   
                                                    MRI LUMBAR SPINE WO/W   
IVCON                                   * * *Final Report* * *  
DATE OF EXAM: 2022 9:51PM  
QBM 0304 - MRI LUMBAR   
SPINE WO/W IVCON /   
ACCESSION # [id number]  
PROCEDURE REASON: Back   
pain, cancer or   
infection suspected  
  
* * * * Physician   
Interpretation * * * *  
EXAMINATION: MRI   
THORACIC SPINE WO/W   
IVCON, MRI LUMBAR   
SPINE WO/W IVCON  
CLINICAL HISTORY: Back   
pain, cancer or   
infection suspected   
additional  
note of history of   
discitis last year.   
Patient reporting   
similar  
symptoms including   
lumbar back pain, leg   
paresthesias, urinary   
retention,  
and difficulty   
ambulating.  
TECHNIQUE: Routine   
lumbosacral and   
thoracic spine MR   
protocol without  
gadolinium. MQ:   
MRTLWO_3  
COMPARISON: There are   
no comparison images.  
RESULT:  
THORACIC:  
Counting reference:   
Lumbosacral junction.   
For the purposes of   
this  
report, L4-5 is   
considered the level   
of the iliac crest and   
assume there  
are 5 lumbar-type   
vertebrae. Anatomic   
variant: None.  
Localizer images: No   
paraspinal masses are   
evident  
Alignment: Scoliotic   
curvature convex   
right, apex at T6-T7.   
Gross  
alignment in the   
sagittal plane appears   
normal.  
Cord: The thoracic   
spinal cord is within   
normal limits of   
signal  
intensity and   
morphology. No   
abnormal cord   
enhancement after   
gadolinium.  
Bone marrow   
signal/fracture: No   
evidence of pathologic   
marrow  
infiltration. No   
evidence of prior   
fracture.  
Thoracic soft tissues:   
The paraspinal soft   
tissues are within   
normal  
limits.  
Canal and foramina:   
The thoracic canal and   
foramina are patent   
within  
the constraints of the   
study.  
LUMBAR:  
Counting reference:   
Lumbosacral junction.   
For the purposes of   
this  
report, L4-5 is   
considered the level   
of the iliac crest and   
assume there  
are 5 lumbar-type   
vertebrae. Anatomic   
variant: None.  
Localizer images:   
There are no   
paraspinal mass is   
evident within the  
field of view.  
Alignment: Mild   
scoliotic curvature   
convex left, apex L3.  
Bone marrow   
signal/fracture:   
Inferior endplate   
Schmorl's node to the  
right of midline at   
L3. Otherwise, no   
gross loss of   
vertebral body  
height or bony   
retropulsion.  
Conus: Distal cord   
terminates normally at   
L1-L2. No gross   
abnormal  
enhancement involving   
distal cord or nerve   
roots of the cauda   
equina.  
Paraspinal soft   
tissues: Paraspinal   
soft tissues are   
within normal  
limits.  
T12-L1: Canal and   
foramina are patent.  
L1-L2: Canal and   
foramina are patent.  
L2-L3: Canal and   
foramina are patent  
L3-L4: Canal and   
foramina are patent  
L4-L5: Canal and   
foramina are patent  
L5-S1: Canal and   
foramina are patent  
Sacrum and iliac   
wings: In the left   
iliac wing, there is   
an area of  
mixed intermediate to   
high T2 signal, mildly   
hyperintense T1 signal   
and  
enhancement after   
gadolinium. On the   
T2-weighted scans,   
there also  
appears to be subtle   
surrounding marrow   
edema. (Series 18,   
image 35;  
series 19, image 35;   
series 25, image 35).   
This area is minimally  
included on the   
sagittals scans.   
Etiology is unclear.   
Presence of  
marrow edema and   
enhancement is   
concerning for the   
possibility of an  
inflammatory or   
infectious process.  
IMPRESSION:  
Mild scoliosis.  
Indeterminate lesion   
in the left iliac wing   
along the   
posterior-medial  
margin of the left SI   
joint. Signal   
characteristics   
suggest partially  
fluid contents and   
there is some   
enhancement after   
gadolinium and likely  
at least some edema in   
the surrounding bone.   
No clear evidence for  
contiguous extension   
into the adjacent soft   
tissues. The intrinsic  
internal contents and   
presence of   
enhancement does raise   
question of  
possible inflammation   
or infection locally.  
There is no evidence   
for vertebral body   
osteomyelitis or   
septic facet  
arthropathy elsewhere   
involving the thoracic   
or lumbar spine on   
this exam.  
Focal endplate defect   
inferior L3 represents   
a Schmorl's node.  
Cord is grossly   
normal. No abnormal   
enhancement. Normally   
patent  
thoracic and lumbar   
spinal canal and   
neural foramina.  
Anatomic   
Thoracic/Lumbar   
Variant: None. L4-5 is   
considered the level   
of  
the iliac crest and   
assume there are 5   
lumbar-type vertebrae.  
[facility]  
Transcribe Date/Time:   
2022 9:59P  
Dictated by : REGINALDO LEAHY MD  
This examination was   
interpreted and the   
report reviewed and  
electronically signed   
by:  
REGINALDO LEAHY MD on 2022 10:13PM EST  
[id number] Normal                                  [facility]  
   
                                                    MRI THORACIC SPINE WO/W IVCO  
Non 2022   
   
                                                    MRI THORACIC SPINE   
WO/W IVCON                              * * *Final Report* * *  
DATE OF EXAM: 2022 9:51PM  
QBM 0326 - MRI   
THORACIC SPINE WO/W   
IVCON / ACCESSION #   
[id number]  
PROCEDURE REASON: Back   
pain, cancer or   
infection suspected  
  
* * * * Physician   
Interpretation * * * *  
EXAMINATION: MRI   
THORACIC SPINE WO/W   
IVCON, MRI LUMBAR   
SPINE WO/W IVCON  
CLINICAL HISTORY: Back   
pain, cancer or   
infection suspected   
additional  
note of history of   
discitis last year.   
Patient reporting   
similar  
symptoms including   
lumbar back pain, leg   
paresthesias, urinary   
retention,  
and difficulty   
ambulating.  
TECHNIQUE: Routine   
lumbosacral and   
thoracic spine MR   
protocol without  
gadolinium. MQ:   
MRTLWO_3  
COMPARISON: There are   
no comparison images.  
RESULT:  
THORACIC:  
Counting reference:   
Lumbosacral junction.   
For the purposes of   
this  
report, L4-5 is   
considered the level   
of the iliac crest and   
assume there  
are 5 lumbar-type   
vertebrae. Anatomic   
variant: None.  
Localizer images: No   
paraspinal masses are   
evident  
Alignment: Scoliotic   
curvature convex   
right, apex at T6-T7.   
Gross  
alignment in the   
sagittal plane appears   
normal.  
Cord: The thoracic   
spinal cord is within   
normal limits of   
signal  
intensity and   
morphology. No   
abnormal cord   
enhancement after   
gadolinium.  
Bone marrow   
signal/fracture: No   
evidence of pathologic   
marrow  
infiltration. No   
evidence of prior   
fracture.  
Thoracic soft tissues:   
The paraspinal soft   
tissues are within   
normal  
limits.  
Canal and foramina:   
The thoracic canal and   
foramina are patent   
within  
the constraints of the   
study.  
LUMBAR:  
Counting reference:   
Lumbosacral junction.   
For the purposes of   
this  
report, L4-5 is   
considered the level   
of the iliac crest and   
assume there  
are 5 lumbar-type   
vertebrae. Anatomic   
variant: None.  
Localizer images:   
There are no   
paraspinal mass is   
evident within the  
field of view.  
Alignment: Mild   
scoliotic curvature   
convex left, apex L3.  
Bone marrow   
signal/fracture:   
Inferior endplate   
Schmorl's node to the  
right of midline at   
L3. Otherwise, no   
gross loss of   
vertebral body  
height or bony   
retropulsion.  
Conus: Distal cord   
terminates normally at   
L1-L2. No gross   
abnormal  
enhancement involving   
distal cord or nerve   
roots of the cauda   
equina.  
Paraspinal soft   
tissues: Paraspinal   
soft tissues are   
within normal  
limits.  
T12-L1: Canal and   
foramina are patent.  
L1-L2: Canal and   
foramina are patent.  
L2-L3: Canal and   
foramina are patent  
L3-L4: Canal and   
foramina are patent  
L4-L5: Canal and   
foramina are patent  
L5-S1: Canal and   
foramina are patent  
Sacrum and iliac   
wings: In the left   
iliac wing, there is   
an area of  
mixed intermediate to   
high T2 signal, mildly   
hyperintense T1 signal   
and  
enhancement after   
gadolinium. On the   
T2-weighted scans,   
there also  
appears to be subtle   
surrounding marrow   
edema. (Series 18,   
image 35;  
series 19, image 35;   
series 25, image 35).   
This area is minimally  
included on the   
sagittals scans.   
Etiology is unclear.   
Presence of  
marrow edema and   
enhancement is   
concerning for the   
possibility of an  
inflammatory or   
infectious process.  
IMPRESSION:  
Mild scoliosis.  
Indeterminate lesion   
in the left iliac wing   
along the   
posterior-medial  
margin of the left SI   
joint. Signal   
characteristics   
suggest partially  
fluid contents and   
there is some   
enhancement after   
gadolinium and likely  
at least some edema in   
the surrounding bone.   
No clear evidence for  
contiguous extension   
into the adjacent soft   
tissues. The intrinsic  
internal contents and   
presence of   
enhancement does raise   
question of  
possible inflammation   
or infection locally.  
There is no evidence   
for vertebral body   
osteomyelitis or   
septic facet  
arthropathy elsewhere   
involving the thoracic   
or lumbar spine on   
this exam.  
Focal endplate defect   
inferior L3 represents   
a Schmorl's node.  
Cord is grossly   
normal. No abnormal   
enhancement. Normally   
patent  
thoracic and lumbar   
spinal canal and   
neural foramina.  
Anatomic   
Thoracic/Lumbar   
Variant: None. L4-5 is   
considered the level   
of  
the iliac crest and   
assume there are 5   
lumbar-type vertebrae.  
: MILAN  
Transcribe Date/Time:   
2022 9:59P  
Dictated by : REGINALDO LEAHY MD  
This examination was   
interpreted and the   
report reviewed and  
electronically signed   
by:  
REGINALDO LEAHY MD on 2022 10:13PM EST  
[id number] Normal                                  [facility]  
   
                                                    Magnesiumon 2022   
   
                      Magnesium [Mass/Vol] 1.9 mg/dL  Normal     1.7-2.3    [facility]  
   
                                        Comment on above:   Performed By: #### B  
MP, MG1, CBCDIF ####[facility]   
[id number] [place], [place] [phone]   
   
                                                    NURSING PROGon 2022   
   
                                        NURSING PROG        HNO ID: [id number]  
Author: Deja Amaro RN  
Service: Radiology  
Author Type:   
Registered Nurse  
Type: Nursing Progress   
Note  
Filed: 2022 8:44   
PM  
Note Text:  
Radiology Service   
Progress Note  
DATE OF SERVICE:   
2022  
TIME: 8:44 PM  
PATIENT WEIGHT: 165LBS  
PATIENT IDENTITY   
VERIFICATION COMPLETED   
USING TWO (2) STANDARD  
IDENTIFIERS: Name and   
Date of Birth   
confirmed by patient   
verbally and  
Name and Date of Birth   
confirmed by   
identification band.  
FALL SCREENING: Has   
the patient had 2   
falls in the last year   
or 1 fall  
with injury or   
currently using an   
Ambulatory Assistive   
Device (Walker,  
Cane, Wheelchair,   
Crutches, etc.)?   
Emergency Room   
Patient: Screened in   
ED  
PATIENT GENDER DATA:   
Female. Pregnancy   
status: Pregnant: No  
Breastfeeding status:   
NO.  
ALLERGIES: Reviewed   
and unchanged  
CONTRAST ALLERGY: No  
EXAM: MRI - CONTRAST   
TYPE: GROUP II  
IV SITE: Inpatient -   
refer to [facility]   
documentation  
IV SITE APPEARANCE:   
Clean,Dry and Intact  
SIGNATURE: Deja Amaro RN PATIENT   
NAME: Shameka Figueredo  
DATE: 2022   
MRN: [id number]  
TIME: 8:44 PM       Normal                                  [facility]  
   
                                        NURSING PROG        HNO ID: [id number]  
Author: Melida Neely RN  
Service: Radiology  
Author Type:   
Registered Nurse  
Type: Nursing Progress   
Note  
Filed: 2022 8:36   
PM  
Note Text:  
Radiology Service   
Progress Note  
DATE OF SERVICE:   
2022  
TIME: 8:34 PM  
PATIENT WEIGHT: 165LBS  
PATIENT IDENTITY   
VERIFICATION COMPLETED   
USING TWO (2) STANDARD  
IDENTIFIERS: Name and   
Date of Birth   
confirmed by patient   
verbally and  
Name and Date of Birth   
confirmed by   
identification band.  
FALL SCREENING: Has   
the patient had 2   
falls in the last year   
or 1 fall  
with injury or   
currently using an   
Ambulatory Assistive   
Device (Walker,  
Cane, Wheelchair,   
Crutches, etc.)?   
Emergency Room   
Patient: Screened in   
ED  
PATIENT GENDER DATA:   
Female. Pregnancy   
status: Pregnant: No  
Breastfeeding status:   
NO.  
ALLERGIES: Reviewed   
and unchanged  
CONTRAST ALLERGY: No  
EXAM: MRI - CONTRAST   
TYPE: GROUP II  
IV SITE: Inpatient -   
refer to [facility]   
documentation LAC 20g  
IV SITE APPEARANCE:   
Clean,Dry and Intact  
SIGNATURE: Melida Neely RN PATIENT   
NAME: Shameka Figueredo  
DATE: 2022   
MRN: [id number]  
TIME: 8:34 PM       Normal                                  [facility]  
   
                                                    Sed Rate Westergrenon 2022   
   
                      Sed Rate Westergren 13 mm/hr   Normal     0-20       [facility]  
   
                                        Comment on above:   Performed By: #### W  
SR, CRP ####[facility]   
[id number] [place], [place] [phone]   
   
                                                    Urinalysis with Microscopico  
n 2022   
   
                      Bilirubin, Urine Negative   Normal     Negative   [facility]  
   
                                        Comment on above:   Performed By: #### U  
AWMIC ####[facility]   
[id number]   
[place] [phone]   
   
                                Clarity (U)     Slightly Cloudy Critically   
abnormal                  Clear                     [facility]  
   
                                        Comment on above:   Performed By: #### U  
AWMIC ####[place] [phone]   
   
                                Color (U)       Light Yellow    Critically   
abnormal                  Yellow                    [facility]  
   
                                        Comment on above:   Performed By: #### U  
AWMIC ####[place] [phone]   
   
                      Comments   SEE COMMENT Normal                [facility]  
   
                                        Comment on above:   Result Comment: N/A   
   
                                                            Performed By: #### U  
AWMIC ####[place] [phone]   
   
                                                    Epithelial cells LM   
Ql (Urine sed)  SEE COMMENT     Normal                          [facility]  
   
                                        Comment on above:   Result Comment: Few  
Squamous Epithelial Cells   
   
                                                            Performed By: #### U  
AWMIC ####[phone]   
   
                      Glucose Ql (U) Negative   Normal     Negative   [facility]  
   
                                        Comment on above:   Performed By: #### U  
AWMIC ####[place] [phone]   
   
                      Hemoglobin/Blood,Ur Negative   Normal     Negative   [facility]  
   
                                        Comment on above:   Performed By: #### U  
AWMIC ####[id number]   
   
                      Ketones Ql (U) Negative   Normal     Negative   [facility]  
   
                                        Comment on above:   Performed By: #### U  
AWMIC ####[place] [address]   
[place] [phone]   
   
                                Leukest         Trace           Critically   
abnormal                  Negative                  [facility]  
   
                                        Comment on above:   Performed By: #### U  
AWMIC ####[place] [address]   
[place] [phone]   
   
                      Nitrite Ql (U) Negative   Normal     Negative   [facility]  
   
                                        Comment on above:   Performed By: #### U  
AWMIC ####[facility]   
[place] [address], [place] [phone]   
   
                      pH (U)     6.0 [pH]   Normal     5.0-8.0    [facility]  
   
                                        Comment on above:   Performed By: #### U  
AWMIC ####[place] [id number]   
[place] [address], [place] [phone]   
   
                      Protein, Urine Negative   Normal     Negative   [facility]  
   
                                        Comment on above:   Performed By: #### U  
AWMIC ####[place] [address]   
[place] [address], [place] [phone]   
   
                      RBC        0-3        Normal     0-3        [facility]  
   
                                        Comment on above:   Performed By: #### U  
AWMIC ####[place] [address]   
[place] [address], [place] [phone]   
   
                      Specific Gravity, Ur 1.013      Normal     1.005-1.030 [facility]  
   
                                        Comment on above:   Performed By: #### U  
AWMIC ####[place] [address]   
[place] [address], [place] [phone]   
   
                      Urine Kenrick Comment SEE COMMENT Normal                [facility]  
   
                                        Comment on above:   Result Comment: N/A   
   
                                                            Performed By: #### U  
AWMIC ####[place] [id number]   
[place] [address], [place] [phone]   
   
                                Urobilinogen Qn (U) {Aneta'U}/dL  Critically   
abnormal                  Negative                  [facility]  
   
                                        Comment on above:   Performed By: #### U  
AWMIC ####[facility]   
[place] [address], [place] [phone]   
   
                      WBC        0-5        Normal     0-5        [facility]  
   
                                        Comment on above:   Performed By: #### U  
AWMIC ####[facility]   
[place] [address], [place] [phone]   
   
                                                    HCG, Quanton 2022   
   
                      HCG, Quant <1         Normal     <5         [facility]  
   
                                        Comment on above:   Result Comment:  
Non-preg premeno <=5  
Postmeno <=8  
Male <=3  
If HCG results do not concur with clinical observations,   
additional testing to  
confirm results is recommended.  
Elevated results not associated with pregnancy may be found in   
patients with  
other diseases such as tumors of the germ cells (testis,   
ovaries, etc.),  
bladder, pancreas, stomach, lungs, and liver.   
   
                                                            Performed By: #### B  
HCG ####  
[address] [place] [address] [phone]  
: Rcihard Méndez MD   
   
                                                    Cult,Genitalon 2021   
   
                                        Cult,Genital        Specimen Description  
   
.VAGINA  
Special Requests NOT   
REPORTED  
Culture NORMAL   
URO-GENITAL ROBINSON  
NEGATIVE FOR NEISSERIA   
GONORRHOEAE  
  
NEGATIVE FOR GROUP B   
STREPTOCOCCI  
  
Report Status FINAL   
2021          Normal                                  [facility]  
   
                                        Comment on above:   Performed By: #### G  
EC ####  
[facility]  
[address] [place], [place] [id number] [phone]  
: Zachariah Daniels MD  
[address] [place] [address] [phone]  
: Richard Méndez MD   
   
                                                    HCG, Quanton 03-   
   
                      HCG, Quant <1         Normal     <5         [facility]  
   
                                        Comment on above:   Result Comment:  
Non-preg premeno <=5  
Postmeno <=8  
Male <=3  
If HCG results do not concur with clinical observations,   
additional testing to  
confirm results is recommended.  
Elevated results not associated with pregnancy may be found in   
patients with  
other diseases such as tumors of the germ cells (testis,   
ovaries, etc.),  
bladder, pancreas, stomach, lungs, and liver.   
   
                                                            Performed By: #### B  
HCG ####  
[address] [place] [address] [phone]  
: Richard Méndez MD   
   
                                                    Otheron 2021   
   
                      Direct Exam Negative                         [place], [place]  
   
                                                    VAGINITIS DNA PROBEon 2021   
   
                                        Direct Exam         Method of testing is  
 a   
DNA probe intended for   
detection and   
identification of   
Candida species,   
Gardnerella vaginalis,   
and Trichomonas   
vaginalis nucleic acid   
in vaginal fluid   
specimens from   
patients with symptoms   
of   
vaginitis/vaginosis.                                         [place]  
   
                      Special Requests NOT REPORTED                       [place]  
   
                      Specimen Description .VAGINA                          [place]  
   
                                                    Otheron 2020   
   
                      Direct Exam Negative                         [place]  
   
                                                    VAGINITIS DNA PROBEon 2020   
   
                                        Direct Exam         Method of testing is  
 a   
DNA probe intended for   
detection and   
identification of   
Candida species,   
Gardnerella vaginalis,   
and Trichomonas   
vaginalis nucleic acid   
in vaginal fluid   
specimens from   
patients with symptoms   
of   
vaginitis/vaginosis.                                         [place]  
   
                      Special Requests NOT REPORTED                       [place]  
   
                      Specimen Description .VAGINA                          [place]  
   
                                                    CBC Auto Differentialon 10-1  
5-2020   
   
                                                    Basophils (Bld)   
[#/Vol]         0.08 10*3/uL                                    [place]  
   
                                                    Basophils/100 WBC   
(Bld)           1 %                             0 - 2 %         [place]  
   
                      Differential Type NOT REPORTED                       [place]  
   
                                                    Eosinophils (Bld)   
[#/Vol]         0.16 10*3/uL                                    [place]  
   
                                                    Eosinophils/100 WBC   
(Bld)           3 %                             1 - 4 %         [place]  
   
                                                    Erythrocyte   
distribution width   
(RBC) [Ratio]   12.0 %                          11.8 - 14.4 %   [place]  
   
                                                    Hematocrit (Bld)   
[Volume fraction] 36.3 %                          36.3 - 47.1 %   [place]  
   
                                                    Hemoglobin (Bld)   
[Mass/Vol]          12.0 g/dL                               11.9 - 15.1   
g/dL                                    [place]  
   
                                                    Immature granulocytes   
(Bld) [#/Vol]   0 %                             0               [place]  
   
                                                    Immature granulocytes   
(Bld) [#/Vol]   10*3/uL                                         [place]  
   
                                                    Lymphocytes (Bld)   
[#/Vol]         1.60 10*3/uL                                    [place]  
   
                                                    Lymphocytes/100 WBC   
(Bld)           28 %                            24 - 43 %       [place]  
   
                                                    MCH (RBC) [Entitic   
mass]           29.6 pg                         25.2 - 33.5 pg  [place]  
   
                          MCHC (RBC) [Mass/Vol] 33.1 g/dL                 28.4 -  
 34.8   
g/dL                                    [place]  
   
                                                    MCV (RBC) [Entitic   
vol]                89.6 fL                                 82.6 - 102.9   
fL                                      [place]  
   
                                                    Monocytes (Bld)   
[#/Vol]         0.35 10*3/uL                                    [place]  
   
                                                    Monocytes/100 WBC   
(Bld)           6 %                             3 - 12 %        [place]  
   
                                                    Platelet mean volume   
(Bld) [Entitic vol] 9.2 fL                          8.1 - 13.5 fL   [place]  
   
                                                    Platelets (Bld)   
[#/Vol]         291 10*3/uL                                     [place]  
   
                                                    Platelets (Bld)   
[#/Vol]         NOT REPORTED                                    [place]  
   
                          RBC (Bld) [#/Vol] 4.05 10*6/uL              3.95 - 5.1  
1   
m/uL                                    [place]  
   
                                                    RBC morphology   
finding Nom (Bld) NOT REPORTED                                    [place]  
   
                                                    Segmented   
neutrophils/100 WBC   
(Bld)           62 %                            36 - 65 %       [place]  
   
                      Segs Absolute 3.57                             [place]  
   
                      WBC (Bld) [#/Vol] 5.8 10*3/uL                       [place]  
   
                          WBC (Bld) [#/Vol] 0.0 10*3/uL               0.0 per 10  
0   
WBC                                     [place]  
   
                      WBC Morphology NOT REPORTED                       [place]  
   
                                                    Comprehensive Metabolic Pane  
ana 10-   
   
                      Albumin [Mass/Vol] 4.5 g/dL              3.5 - 5.2 g/dL [place]  
   
                                                    Albumin/Globulin   
[Mass ratio]    1.6 {ratio}                                     [place]  
   
                                                    ALP [Catalytic   
activity/Vol]   95 U/L                          35 - 104 U/L    [place]  
   
                                                    ALT [Catalytic   
activity/Vol]   9 U/L                           5 - 33 U/L      [place]  
   
                      Anion gap [Moles/Vol] 12 mmol/L             9 - 17 mmol/L   
[place]  
   
                                                    AST [Catalytic   
activity/Vol]   16 U/L                          <32             [place]  
   
                          Bilirubin Ql (U) 0.39 mg/dL                0.3 - 1.2   
mg/dL                                   [place]  
   
                      Bun/Cre Ratio 11                               [place]  
   
                          Calcium [Mass/Vol] 9.0 mg/dL                 8.6 - 10.  
4   
mg/dL                                   [place]  
   
                          Chloride [Moles/Vol] 101 mmol/L                98 - 10  
7   
mmol/L                                  [place]  
   
                      CO2 [Moles/Vol] 24 mmol/L             20 - 31 mmol/L [place]  
   
                          Creatinine [Mass/Vol] 0.72 mg/dL                0.5 -   
0.9   
mg/dL                                   [place]  
   
                      GFR  >60                   >60 mL/min [place]  
   
                                                    GFR Non-   
American        >60                             >60 mL/min      [place]  
   
                      Glucose [Mass/Vol] 86 mg/dL              70 - 99 mg/dL [place]  
   
                          Potassium [Moles/Vol] 3.9 mmol/L                3.7 -   
5.3   
mmol/L                                  [place]  
   
                      Protein [Mass/Vol] 7.4 g/dL              6.4 - 8.3 g/dL [place]  
   
                          Sodium [Moles/Vol] 137 mmol/L                135 - 144  
   
mmol/L                                  [place]  
   
                                                    Urea nitrogen   
[Mass/Vol]      8 mg/dL                         6 - 20 mg/dL    [place]  
   
                                                    Metabolic Panelon 10-  
   
   
                                                    GFR/1.73 sq M   
predicted among   
non-blacks MDRD   
(S/P/Bld) [Vol   
rate/Area]                                                      [place]  
   
                                        Comment on above:   Average GFR for 20-2  
9 years old:  
116 mL/min/1.73sq m  
Chronic Kidney Disease:  
<60 mL/min/1.73sq m  
Kidney failure:  
<15 mL/min/1.73sq m  
  
  
eGFR calculated using average adult body mass. Additional eGFR   
calculator available at:  
  
http://www.[employer]/multiple_crcl_2012.htm  
  
  
   
   
                                                            Stage 1: Some kidney  
 damage normal GFR  
Stage 2: Mild kidney damage GFR 60-89  
Stage 3: Moderate kidney damage GFR 30-59  
Stage 4: Severe kidney damage GFR 15-29  
Stage 5: Severe kidney damage GFR <15  
ESRD - chronic treatment by dialysis or transplant  
  
  
   
   
                                                    Microscopic Urinalysison 10-  
   
   
                      Amorphous, UA NOT REPORTED            None       [place]  
   
                      Bacteria, UA NOT REPORTED            None       [place]  
   
                      Casts UA   NOT REPORTED            /LPF       [place]  
   
                      Crystals, UA NOT REPORTED            None /HPF  [place]  
   
                      Epithelial Cells UA 0 TO 2                           [place]  
   
                      Mucus, UA  NOT REPORTED            None       [place]  
   
                      Other Observations UA NOT REPORTED            NOT REQ.   M  
[place]  
   
                      RBC (U) [#/Vol] 2 TO 5                           [place]  
   
                      Renal Epithelial, UA NOT REPORTED            0 /HPF     Me  
[place]  
   
                      Trichomonas, UA NOT REPORTED            None       [place]  
   
                      WBC, UA    0 TO 2                           [place]  
   
                      Yeast, UA  NOT REPORTED            None       [place]  
   
                      -                                           [place]  
   
                                                    Pregnancy, Urineon 10-  
0   
   
                                                    Beta HCG (pregnancy   
test) Ql (U)    Negative                        NEGATIVE        [place]  
   
                                        Comment on above:   Specimens with hCG l  
evels near the threshold of the test (25   
mIU/mL) may give a negative or  
indeterminate result. In such cases, another test should be   
performed with a new specimen  
in 48-72 hours. If early pregnancy is suspected clinically in   
this setting, correlation  
with quantitative serum b-hCG level is suggested.  
  
[facility] has confirmed the use of plasma for this   
test. This has not been cleared  
or approved by the U.S. Food and Drug Administration. The FDA   
has determined that such  
clearance is not necessary.  
  
   
   
                                                    Urinalysis Reflex to Culture  
on 10-   
   
                      Bilirubin Urine Negative              NEGATIVE   [place]  
   
                      Color, UA  YELLOW                YELLOW     [place]  
   
                      Glucose, Ur Negative              NEGATIVE   [place]  
   
                                                    Interpretation and   
review of laboratory   
results         Abnormal                                        [place]  
   
                      Ketones Ql (U) Negative              NEGATIVE   [place]  
   
                                                    Leukocyte esterase   
Test strip Ql (U) Negative                        NEGATIVE        [place]  
   
                      Nitrite, Urine Negative              NEGATIVE   [place]  
   
                      pH, UA     6.5                              [place]  
   
                                                    Protein (U)   
[Mass/Vol]      Negative                        NEGATIVE        [place]  
   
                      Specific [place], UA 1.010                            [place]  
   
                      Turbidity UA CLEAR                 CLEAR      [place]  
   
                      Urinalysis Comments NOT REPORTED                       [place]  
   
                      Urine Hgb  3+         Abnormal   NEGATIVE   [place]  
   
                      Urobilinogen, Urine Normal                Normal     [place]  
   
                                                    US NON OB TRANSVAGINALon    
   
                                                    US NON OB   
TRANSVAGINAL                            UTERUS: Homogenous   
appearing retroverted   
uterus, WNL  
?  
ENDO: measures 5 mm,   
IUD noted in good   
position  
?  
RT. OVARY: WNL  
?  
LT. OVARY: WNL  
?  
No free fluid  
Interpreted by:  
Pranav Larsen,   
[place] [facility]  
Cathleen Dela Cruz DO  
Signed by:  
Cathleen Dela Cruz DO  
3/24/20  
Final result        Normal                                  [facility]  
   
                                                    CBC With Auto Differentialon  
 2020   
   
                                                    Basophils (Bld)   
[#/Vol]         0.04 10*3/uL                                    [place]  
   
                                                    Basophils/100 WBC   
(Bld)           1 %                             0 - 2 %         [place]  
   
                      Differential Type NOT REPORTED                       [place]  
   
                                                    Eosinophils (Bld)   
[#/Vol]         0.25 10*3/uL                                    [place]  
   
                                                    Eosinophils/100 WBC   
(Bld)           6 %             High            1 - 4 %         [place]  
   
                                                    Erythrocyte   
distribution width   
(RBC) [Ratio]   13.0 %                          11.8 - 14.4 %   [place]  
   
                                                    Hematocrit (Bld)   
[Volume fraction] 36.7 %                          36.3 - 47.1 %   [place]  
   
                                                    Hemoglobin (Bld)   
[Mass/Vol]          11.6 g/dL           Low                 11.9 - 15.1   
g/dL                                    [place]  
   
                                                    Immature granulocytes   
(Bld) [#/Vol]   0 %                             0               [place]  
   
                                                    Immature granulocytes   
(Bld) [#/Vol]   10*3/uL                                         [place]  
   
                                                    Interpretation and   
review of laboratory   
results         Abnormal                                        [place]  
   
                                                    Lymphocytes (Bld)   
[#/Vol]         0.96 10*3/uL    Low                             [place]  
   
                                                    Lymphocytes/100 WBC   
(Bld)           22 %            Low             25 - 45 %       [place]  
   
                                                    MCH (RBC) [Entitic   
mass]           28.9 pg                         25.2 - 33.5 pg  [place]  
   
                          MCHC (RBC) [Mass/Vol] 31.6 g/dL                 28.4 -  
 34.8   
g/dL                                    [place]  
   
                                                    MCV (RBC) [Entitic   
vol]                91.3 fL                                 82.6 - 102.9   
fL                                      [place]  
   
                                                    Monocytes (Bld)   
[#/Vol]         0.42 10*3/uL                                    [place]  
   
                                                    Monocytes/100 WBC   
(Bld)           10 %            High            2 - 8 %         [place]  
   
                                                    Platelet mean volume   
(Bld) [Entitic vol] 9.5 fL                          8.1 - 13.5 fL   [place]  
   
                                                    Platelets (Bld)   
[#/Vol]         241 10*3/uL                                     [place]  
   
                                                    Platelets (Bld)   
[#/Vol]         NOT REPORTED                                    [place]  
   
                          RBC (Bld) [#/Vol] 4.02 10*6/uL              3.95 - 5.1  
1   
m/uL                                    [place]  
   
                                                    RBC morphology   
finding Nom (Bld) NOT REPORTED                                    [place]  
   
                                                    Segmented   
neutrophils/100 WBC   
(Bld)           61 %                            34 - 64 %       [place]  
   
                      Segs Absolute 2.72                             [place]  
   
                      WBC (Bld) [#/Vol] 4.4 10*3/uL Low                   [place]  
   
                          WBC (Bld) [#/Vol] 0.0 10*3/uL               0.0 per 10  
0   
WBC                                     [place]  
   
                      WBC Morphology NOT REPORTED                       [place]  
   
                                                    HCG, Quantitative, Pregnancy  
on 2020   
   
                      hCG Quant  <1                    <5 IU/L    [place]  
   
                                        Comment on above:     
Non-preg premeno <=5  
Postmeno <=8  
Male <=3  
If HCG results do not concur with clinical observations,   
additional testing to confirm  
results is recommended.  
Elevated results not associated with pregnancy may be found in   
patients with other diseases  
such as tumors of the germ cells (testis, ovaries, etc.),   
bladder, pancreas, stomach, lungs,  
and liver.  
  
   
   
                                                    US Pelvis Non-OB Completeon   
2017   
   
                                                    US Pelvis Non-OB   
Complete                                Exam   
Date/Time:2017   
10:03 EDTReason for   
Exam:menorrhagiaReport  
IMPRESSION: NEGATIVE   
ULTRASOUND OF THE   
PELVIS.CLINICAL   
HISTORY: menorrhagia.   
COMMENT:   
Transabdominal and   
transvaginal images   
were obtained. The   
uterus is normal in   
size and   
configuration. No   
fluid is noted within   
the uterinecanal. No   
abnormality of the   
echo pattern of the   
uterus is noted. No   
uterine mass   
isevident.There is a   
0.9 cm follicular   
right ovarian cyst.   
Both ovaries are   
otherwiseunremarkable.   
No large cyst nor   
adnexal mass is   
evident. There is no   
free fluid inthe   
cul-de-sac. Please   
refer to the   
measurements and data   
that follow.*****   
FINAL REPORT *****   
Dictated: 2017   
12:55 pm Curry Gutiérrez M.D. Signed   
(Electronic   
Signature): 2017   
12:55 pm Signed by:   
Curry Gutiérrez M.D.   
Transcribed by: MACIE   
Technologist:   
[employer]   
CommentsTransabdominal   
Ultrasound   
PerformedTransvaginal   
Ultrasound   
PerformedUterus   
Measurements (in cm)   
7.19 x 4.10 x 3.34 vol   
= 51.55Position   
AntevertedEndometrium.   
Measurements (in cm)   
0.63Right Ovary   
Measurements (in cm)   
3.26 x 3.59 x 2.38 vol   
= 10.49Left Ovary   
Measurements (in cm)   
3.42 x 1.56 x 2.80 vol   
= 7.79              [facility]  
   
                                                    US Transvaginal Non-OBon    
   
                                                    US Transvaginal   
Non-OB                                  Exam   
Date/Time:2017   
10:03 EDTReason for   
Exam:menorrhagiaReport  
PLEASE REFER TO THE   
ULTRASOUND PELVIS   
NON-OB COMPLETE   
REPORT.***** FINAL   
REPORT ***** Dictated:   
2017 12:55 pm   
Curry Gutiérrez M.D.   
Signed (Electronic   
Signature): 2017   
12:55 pm Signed by:   
Curry Gutiérrez M.D.   
Transcribed by: MACIE   
Technologist: BLM   [facility]  
  
  
  
Vital Signs  
  
  
                          Date Time    Vital Sign   Value        Performing   
Clinician                               Facility  
   
                                                    2024   
13:      Body height     170.18 cm                       [facility]  
   
                                                    2024   
13:                              Body mass index (BMI)   
[Ratio]             26.3 kg/m2                              [facility]  
   
                                                    2024   
13:      Body weight     76.2 kg                         [facility]  
   
                                                    2024   
13:                              Diastolic blood   
pressure            68 mm[Hg]                               [facility]  
   
                                                    2024   
13:      Heart rate      77 /min                         [facility]  
   
                                                    2024   
13:                              Systolic blood   
pressure            108 mm[Hg]                              [facility]  
   
                                                    2024   
15:      Body height     170.18 cm                       [facility]  
   
                                                    2024   
15:                              Body mass index (BMI)   
[Ratio]             26.2 kg/m2                              [facility]  
   
                                                    2024   
15:      Body temperature 98.2 [degF]                     [facility]  
   
                                                    2024   
15:      Body weight     75.97 kg                        [facility]  
   
                                                    2024   
15:                              Diastolic blood   
pressure            72 mm[Hg]                               [facility]  
   
                                                    2024   
15:      Heart rate      66 /min                         [facility]  
   
                                                    2024   
15:      Respiratory rate 12 /min                         [facility]  
   
                                                    2024   
15:                              Systolic blood   
pressure            112 mm[Hg]                              [facility]  
   
                                                    2023   
13:          Respiratory rate    16 /min             MD Bj Menjivar  
Work Phone:   
[phone]                          [facility]  
   
                                                    2023   
09:          Body temperature    97.9 [degF]         MD Bj Menjivar  
Work Phone:   
[phone]                          [facility]  
   
                                                    2023   
09:                              Diastolic blood   
pressure                  71 mm[Hg]                 MD Bj Menjivar  
Work Phone:   
[phone]                          [facility]  
   
                                                    2023   
09:          Heart rate          79 /min             MD Bj Menjivar  
Work Phone:   
[phone]                          [facility]  
   
                                                    2023   
09:                              SaO2% (BldA) [Mass   
fraction]                 99 %                      MD Bj Menjivar  
Work Phone:   
[phone]                          [facility]  
   
                                                    2023   
09:                              Systolic blood   
pressure                  115 mm[Hg]                MD Bj Menjivar  
Work Phone:   
[phone]                          [facility]  
   
                                                    2023   
07:                              Inhaled oxygen   
concentration             100 %                     MD Bj Menjivar  
Work Phone:   
[phone]                          [facility]  
   
                                                    2023   
07:                              Inhaled oxygen flow   
rate                      10 L/min                  MD Bj Menjivar  
Work Phone:   
[phone]                          [facility]  
   
                                                    2023   
01:          Body height         172.72 cm           MD Bj Menjivar  
Work Phone:   
[phone]                          [facility]  
   
                                                    2023   
01:          Body weight         81.64 kg            MD Bj Menjivar  
Work Phone:   
[phone]                          [facility]  
   
                                                    2023   
23:          Respiratory rate    16 /min             MD Bj Menjivar  
Work Phone:   
[phone]                          [facility]  
   
                                                    2023   
22:          Body temperature    97 [degF]           MD Bj Menjivar  
Work Phone:   
[phone]                          [facility]  
   
                                                    2023   
22:                              Diastolic blood   
pressure                  68 mm[Hg]                 MD Bj Menjivar  
Work Phone:   
[phone]                          [facility]  
   
                                                    2023   
22:          Heart rate          112 /min            MD Bj Menjivar  
Work Phone:   
[phone]                          [facility]  
   
                                                    2023   
22:                              SaO2% (BldA) [Mass   
fraction]                 100 %                     MD Bj Menjivar  
Work Phone:   
[phone]                          [facility]  
   
                                                    2023   
22:                              Systolic blood   
pressure                  122 mm[Hg]                MD Bj Menjivar  
Work Phone:   
[phone]                          [facility]  
   
                                                    2023   
20:          Body height         172.72 cm           MD Bj Menjivar  
Work Phone:   
[phone]                          [facility]  
   
                                                    2023   
20:          Body weight         83.91 kg            MD Bj Menjivar  
Work Phone:   
[phone]                          [facility]  
   
                                                    2023   
18:          Respiratory rate    14 /min             MD Bj Menjivar  
Work Phone:   
[phone]                          [facility]  
   
                                                    2023   
17:                              Diastolic blood   
pressure                  59 mm[Hg]                 MD Bj Menjivar  
Work Phone:   
[phone]                          [facility]  
   
                                                    2023   
17:          Heart rate          74 /min             MD Bj Menjivar  
Work Phone:   
[phone]                          [facility]  
   
                                                    2023   
17:                              Systolic blood   
pressure                  120 mm[Hg]                MD Bj Menjivar  
Work Phone:   
[phone]                          [facility]  
   
                                                    2023   
17:                              SaO2% (BldA) [Mass   
fraction]                 100 %                     MD Bj Menjivar  
Work Phone:   
[phone]                          [facility]  
   
                                                    2023   
17:          Body temperature    96.8 [degF]         MD Bj Menjivar  
Work Phone:   
[phone]                          [facility]  
   
                                                    2023   
16:          Body height         170.18 cm           MD Bj Menjivar  
Work Phone:   
[phone]                          [facility]  
   
                                                    2023   
16:          Body weight         77.11 kg            MD Bj Menjivar  
Work Phone:   
[phone]                          [facility]  
   
                                                    2023   
17:          Respiratory rate    18 /min             MD Bj Menjivar  
Work Phone:   
[phone]                          [facility]  
   
                                                    2023   
15:          Body height         170.18 cm           MD Bj Menjivar  
Work Phone:   
[phone]                          [facility]  
   
                                                    2023   
15:          Body weight         77.11 kg            MD Bj Menjivar  
Work Phone:   
[phone]                          [facility]  
   
                                                    2023   
15:          Body temperature    97.3 [degF]         MD Bj Menjivar  
Work Phone:   
[phone]                          [facility]  
   
                                                    2023   
15:                              Diastolic blood   
pressure                  54 mm[Hg]                 MD Bj Menjivar  
Work Phone:   
[phone]                          [facility]  
   
                                                    2023   
15:          Heart rate          73 /min             MD Bj Menjivar  
Work Phone:   
[phone]                          [facility]  
   
                                                    2023   
15:                              SaO2% (BldA) [Mass   
fraction]                 100 %                     MD Bj Menjivar  
Work Phone:   
[phone]                          [facility]  
   
                                                    2023   
15:                              Systolic blood   
pressure                  113 mm[Hg]                MD Bj Menjivar  
Work Phone:   
[phone]                          [facility]  
   
                                                    2023   
13:          Body weight         76.66 kg            Eros Smith MD  
Work Phone:   
[phone]                          [facility]  
   
                                                    2022   
14:          Body height         172.7 cm            Umm Rico MD  
Work Phone:   
[phone]                          [facility]  
   
                                                    2022   
14:          Body weight         78.02 kg            Umm Rico MD  
Work Phone:   
[phone]                          [facility]  
   
                                                    2022   
14:                              Diastolic blood   
pressure                  50 mm[Hg]                 Umm Rico MD  
Work Phone:   
[phone]                          [facility]  
   
                                                    2022   
14:          Heart rate          77 /min             Umm Rico MD  
Work Phone:   
[phone]                          [facility]  
   
                                                    2022   
14:                              Systolic blood   
pressure                  117 mm[Hg]                Umm Rico MD  
Work Phone:   
[phone]                          [facility]  
   
                                                    2022   
13:          Body weight         78.02 kg            Umm Rico MD  
Work Phone:   
[phone]                          [facility]  
   
                                                    2022   
09:          Body temperature    98.78 [degF]        Bj Menjivar  
Other Phone:   
[phone]                          [facility]  
   
                                                    2022   
09:                              Diastolic blood   
pressure                  48 mm[Hg]                 Bj Menjivar  
Other Phone:   
[phone]                          [facility]  
   
                                                    2022   
09:          Heart rate          81 /min             Bj Menjivar  
Other Phone:   
[phone]                          [facility]  
   
                                                    2022   
09:                              SaO2% (BldA) [Mass   
fraction]                 97 %                      Bj Menjivar  
Other Phone:   
[phone]                          [facility]  
   
                                                    2022   
09:                              Systolic blood   
pressure                  96 mm[Hg]                 Bj Menjivar  
Other Phone:   
[phone]                          [facility]  
   
                                                    2022   
14:                              Diastolic blood   
pressure                  72 mm[Hg]                 MD Bj Menjivar  
Work Phone:   
[phone]                          [facility]  
   
                                                    2022   
14:          Heart rate          88 /min             MD Bj Menjivar  
Work Phone:   
[phone]                          [facility]  
   
                                                    2022   
14:          Respiratory rate    18 /min             MD Bj Menjivar  
Work Phone:   
[phone]                          [facility]  
   
                                                    2022   
14:                              SaO2% (BldA) [Mass   
fraction]                 100 %                     MD Bj Menjivar  
Work Phone:   
[phone]                          [facility]  
   
                                                    2022   
14:                              Systolic blood   
pressure                  123 mm[Hg]                MD Bj Menjivar  
Work Phone:   
[phone]                          [facility]  
   
                                                    2022   
10:          Body height         172.72 cm           MD Bj Menjivar  
Work Phone:   
[phone]                          [facility]  
   
                                                    2022   
10:          Body temperature    98.7 [degF]         MD Bj Menjivar  
Work Phone:   
[phone]                          [facility]  
   
                                                    2022   
10:          Body weight         77.9 kg             MD Bj Menjivar  
Work Phone:   
[phone]                          [facility]  
   
                                                    2022   
16:          Body height         170.18 cm           MD Bj Menjivar  
Work Phone:   
[phone]                          [facility]  
   
                                                    2022   
16:          Body temperature    97.6 [degF]         MD Bj Menjivar  
Work Phone:   
[phone]                          [facility]  
   
                                                    2022   
16:          Body weight         77.11 kg            MD Bj Menjivar  
Work Phone:   
[phone]                          [facility]  
   
                                                    2022   
16:                              Diastolic blood   
pressure                  83 mm[Hg]                 MD Bj Menjivar  
Work Phone:   
[phone]                          [facility]  
   
                                                    2022   
16:          Heart rate          74 /min             MD Bj Menjivar  
Work Phone:   
[phone]                          [facility]  
   
                                                    2022   
16:          Respiratory rate    18 /min             MD Bj Menjivar  
Work Phone:   
[phone]                          [facility]  
   
                                                    2022   
16:                              SaO2% (BldA) [Mass   
fraction]                 99 %                      MD Bj Menjivar  
Work Phone:   
[phone]                          [facility]  
   
                                                    2022   
16:                              Systolic blood   
pressure                  124 mm[Hg]                MD Bj Menjivar  
Work Phone:   
[phone]                          [facility]  
   
                                                    2022   
09:      Body temperature 98.4 [degF]     Mri (I-Stat/1.5t) [facility]  
   
                                                    2022   
09:                              Diastolic blood   
pressure            61 mm[Hg]           Mri (I-Stat/1.5t)   [facility]  
   
                                                    2022   
09:      Heart rate      79 /min         [facility]ri (I-Stat/1.5t) [facility]  
   
                                                    2022   
09:      Respiratory rate 20 /min         [facility]ri (I-Stat/1.5t) [facility]  
   
                                                    2022   
09:                              SaO2% (BldA) [Mass   
fraction]           99 %                Mri (I-Stat/1.5t)   [facility]  
   
                                                    2022   
09:                              Systolic blood   
pressure            115 mm[Hg]          [facility]ri (I-Stat/1.5t)   [facility]  
   
                                                    10-   
19:      BP Diastolic    64 mm[Hg]       [facility],   
[place]  
   
                                                    10-   
19:      BP Systolic     118 mm[Hg]      [facility],   
[place]  
   
                                                    10-   
19:      Pulse (Heart Rate) 70 /min         [facility],   
[place]  
   
                                                    10-   
19:      Pulse Oximetry  100 %           [facility],   
[place]  
   
                                                    10-   
19:      Respiratory Rate 16 /min         [facility]   
[place]  
   
                                                    10-   
15:      BMI (Body Mass Index) 24.33 kg/m2     Layla Castaneda [facility],  
   
[place]  
   
                                                    10-   
15:      Body Temperature 97.39 [degF]    [facility],   
[place]  
   
                                                    10-   
15:      Body weight     72.58 kg        Layla Renteria [facility],   
[place]  
   
                                                    10-   
15:      Height          172.7 cm        [facility]   
[place]  
  
  
  
Encounters  
  
  
                          Encounter Date Encounter Type Care Provider Facility  
   
                                                    Start: 2024  
End: 2024     ambulatory                              [facility]  
Work Phone:   
[phone]  
   
                                                    Start: 2024  
End: 2024                         Patient encounter   
procedure                                           [facility]   
[facility]  
Work Phone:   
[phone]  
   
                                                    Start: 2024  
End: 2024     ambulatory                              [facility]   
Center  
Work Phone:   
[phone]  
   
                                                    Start: 2024  
End: 2024                         Patient encounter   
procedure                                           [facility]   
[facility]  
Work Phone:   
[phone]  
   
                                                    Start: 2023  
End: 2023     ambulatory          Shanika Gordon        Facility:[facility]  
   
                                                    Start: 2023  
End: 2023           ambulatory                MD Bj Menjivar  
Work Phone:   
[phone]                          [facility]  
Work Phone:   
[phone]  
   
                                                    Start: 2023  
End: 2023                         Patient encounter   
procedure                               MD Bj Menjivar  
Work Phone:   
[phone]                          [facility]   
Visit  
Work Phone:   
[phone]  
   
                                                    Start: 2023  
End: 2023                         Evaluation and   
management of inpatient   Bj Menjivar            Facility:[facility]  
   
                                                    Start: 2023  
End: 2023                         Evaluation and   
management of inpatient                 MD Bj Menjivar  
Work Phone:   
[phone]                          [facility]   
[facility]  
Work Phone:   
[phone]  
   
                                                    Start: 2023  
End: 2023     ambulatory          Bj Menjivar      Facility:[facility]  
   
                                                    Start: 2023  
End: 2023                         Patient encounter   
procedure                               MD Bj Menjivar  
Work Phone:   
[phone]                          [facility]   
Labor - O/P  
   
                                                    Start: 2023  
End: 2023     ambulatory          Ro Jesiawira    Facility:[facility]  
   
                                                    Start: 2023  
End: 2023           ambulatory                MD Bj Menjivar  
Work Phone:   
[phone]                          [facility]  
Work Phone:   
[phone]  
   
                                                    Start: 2023  
End: 2023           Departed Referred         MD Bj Menjivar  
Work Phone:   
[phone]                          [facility]   
[place]  
Work Phone:   
[phone]  
   
                                                    Start: 2023  
End: 2023     ambulatory          DR LAWANDA TRIPLETT . Facility:  
   
                                                    Start: 2023  
End: 2023     ambulatory          Bj Menjivar      Facility:[facility]  
   
                                                    Start: 2023  
End: 2023           ambulatory                MD Bj Menjivar  
Work Phone:   
[phone]                          [facility]  
Work Phone:   
[phone]  
   
                                                    Start: 2023  
End: 2023                         Patient encounter   
procedure                               MD Bj Menjivar  
Work Phone:   
[phone]                          [facility]   
Labor - O/P  
   
                                                    Start: 2023  
End: 2023     ambulatory          Juan Francisco Visci       Facility:[facility]  
   
                                                    Start: 2023  
End: 2023           ambulatory                MD Bj Menjivar  
Work Phone:   
[phone]                          [facility]  
Work Phone:   
[phone]  
   
                                                    Start: 2023  
End: 2023                         Patient encounter   
procedure                               MD Bj Menjivar  
Work Phone:   
[phone]                          [facility]   
Labor - O/P  
   
                                                    Start: 2023  
End: 2023                         Patient encounter   
procedure                               Eros Smith MD  
Work Phone:   
[phone]                          [facility]   
Medicine  
   
                                        Comment on above:   History of  d  
elivery, currently pregnant in second   
trimester   
[O09.892 (ICD-10-CM)] (Primary Dx);  
Encounter for follow-up ultrasound of fetal anatomy   
   
                                                    Start: 2022  
End: 2022           ambulatory                OB GYN TRANSCRIBE   
PROVIDER                                Facility:[facility]  
   
                                                    Start: 2022  
End: 2022                         Patient encounter   
procedure                               Umm Rico MD  
Work Phone:   
[phone]                          [facility]   
[facility]  
   
                                        Comment on above:   History of  d  
elivery, currently pregnant in second   
trimester   
(Primary Dx);  
Encounter for fetal anatomic survey   
   
                                                            Encounter for follow  
-up ultrasound of fetal anatomy (Primary Dx)   
   
                                                    Start: 11-  
End: 11-                         Emergency department   
patient visit             BJ MENJIVAR            Facility:[facility]  
   
                                Start: 2022 Telephone encounter [facility]  
Work Phone:   
[phone]                          [facility]  
   
                                        Comment on above:   Appointment   
   
                                Start: 2022 Transcribe Orders Ob Gyn Trans  
cribe   
Provider                                [facility]   
[facility]icine  
   
                                                    Start: 10-  
End: 10-     ambulatory          MATT MOREL     Facility:  
   
                                Start: 10- ambulatory      Estelle WALDENCNP  
Work Phone:   
[phone]                          [facility]  
   
                                        Comment on above:   Left hip pain   
   
                                                    Start: 2022  
End: 2022           ambulatory                Saul Jennifer  
Other Phone:   
[phone]                           [facility]   
[employer]   
[employer]  
Other Phone:   
[phone]  
   
                          Start: 2022 Telephone encounter Saul Rodriguez   [facility]  
 Psychiatry  
   
                                                    Start: 2022  
End: 2022                         Emergency department   
patient visit                           Dr. Bj Menjivar                                   Facility:[id number]  
   
                                                    Start: 2022  
End: 2022                         Evaluation and   
management of inpatient   Saul Jennifer [address]   
[address]  
   
                                                    Start: 2022  
End: 2022           ambulatory                Saul Jennifer  
Other Phone:   
[phone]                           [facility]   
[employer]   
[employer]  
Other Phone:   
[phone]  
   
                          Start: 2022 Telephone encounter Saul Jennifer   [facility]  
 Psychiatry  
   
                                                    Start: 2022  
End: 2022                         Emergency department   
patient visit                           MD Bj Menjivar  
Work Phone:   
[phone]                          [facility]   
Room  
   
                                                    Start: 2022  
End: 2022                         Patient encounter   
procedure                               MD Bj Menjivar  
Work Phone:   
[phone]                          [facility]   
[facility]  
   
                                                    Start: 2022  
End: 2022                         Patient encounter   
procedure                               MD Bj Menjivar  
Work Phone:   
[phone]                          [facility]   
[facility]  
   
                                                    Start: 2022  
End: 2022     ambulatory          MIRIAM MONAE .  Facility:[facility]  
   
                                                    Start: 2022  
End: 2022                         Patient encounter   
procedure                               MD Bj Menjivar  
Work Phone:   
[phone]                          [facility]   
[facility]  
   
                                                    Start: 08-  
End: 08-           ambulatory                Saul Jennifer  
Other Phone:   
[phone]                           [facility]   
[employer]   
[employer]  
Other Phone:   
[phone]  
   
                          Start: 08- Telephone encounter Saul Jennifer   [facility]  
 Psychiatry  
   
                                                    Start: 2022  
End: 2022           ambulatory                Najma Adams  
Other Phone:   
[phone]                           [facility]   
[employer]   
[employer]  
Other Phone:   
[phone]  
   
                                        Start: 2022   Nursing evaluation o  
f   
patient and report        Najma Adams              [facility]  
   
                                                    Start: 2022  
End: 2022     ambulatory          DR ALEX LARSEN     Facility:[facility]  
   
                                                    Start: 2022  
End: 2022                         Emergency department   
patient visit                           MD Bj Menjivar  
Work Phone:   
[phone]                          [facility]   
Room  
   
                                                    Start: 08-  
End: 08-                         Patient encounter   
procedure                               MD Bj Menjivar  
Work Phone:   
[phone]                          [facility]   
[place]  
   
                                                    Start: 08-  
End: 08-           ambulatory                Saul Jennifer  
Other Phone:   
[phone]                           [facility]   
[employer]   
[employer]  
Other Phone:   
[phone]  
   
                          Start: 08- Telephone encounter Saul Jennifer   [facility]  
 Psychiatry  
   
                                                    Start: 2022  
End: 2022           ambulatory                Saul Jennifer  
Other Phone:   
[phone]                           [facility]   
[employer]   
[employer]  
Other Phone:   
[phone]  
   
                          Start: 2022 Telephone encounter Saul Jennifer   [facility]  
 Psychiatry  
   
                                                    Start: 2022  
End: 2022                         Patient encounter   
procedure                               MD Bj Menjivar  
Work Phone:   
[phone]                          [facility]  
   
                                                    Start: 2022  
End: 2022           Departed Referred         MD Bj Menjivar  
Work Phone:   
[phone]                          [facility]   
Health   
   
                                                    Start: 2022  
End: 2022     ambulatory          [facility]    Facility:[facility]  
   
                                                    Start: 2022  
End: 2022           [facility]   
APRN.CNP  
Work Phone:   
[phone]                          [facility]  
   
                                        Comment on above:   Sacroiliac joint himanshu  
n (Primary Dx);  
Chronic bilateral low back pain without sciatica;  
Bone lesion;  
Disorder of bone   
   
                                                    Start: 2022  
End: 2022                         Patient encounter   
procedure                               MD Bj Menjivar  
Work Phone:   
[phone]                          [facility]   
[place]  
   
                                                    Start: 2022  
End: 2022           ambulatory                Saul Jennifer  
Other Phone:   
[phone]                           [facility]   
[employer]   
[employer]  
Other Phone:   
[phone]  
   
                          Start: 2022 Telephone encounter Saul Jennifer   [facility]  
 Psychiatry  
   
                                                    Start: 2022  
End: 2022     [facility]          [facility]       [facility]  
   
                                                    Start: 2022  
End: 2022     ambulatory          [facility]       Facility:[facility]  
   
                                                    Start: 2022  
End: 2022                         Emergency department   
patient visit             BJ MENJIVAR            Facility:[facility]  
   
                                Start: 2022 Telephone encounter Sima muhammad MD  
Work Phone:   
[phone]                          [facility]  
   
                                        Comment on above:   Results; Follow Up   
   
                                                            biospy   
   
                                                            Biopsy Request   
   
                                                    Start: 2022  
End: 2022                         Subsequent hospital   
visit by physician                      [facility]   
(I-Stat/1.5t)                           Radiology  
   
                                        Comment on above:   Disorder of bone [M8  
9.9]   
   
                                Start: 2022 ambulatory      Sima GAUTAM  
Work Phone:   
[phone]                          [facility]  
   
                                        Start: 2022   Patient encounter   
procedure                               Sima Quinonez MD  
Work Phone:   
[phone]                          [facility]  
   
                                                    Start: 2022  
End: 2022     ambulatory          ESTELLE THOMPSON    Facility:[facility]  
   
                                Start: 02- [facility]      [facility]                                [facility]  
   
                                                    Start: 02-  
End: 02-           ambulatory                [facility]                                Facility:[facility]  
   
                                                    Start: 2022  
End: 02-                         Emergency department   
patient visit             ELENA POWELL           Facility:[facility]  
   
                                                    Start: 2022  
End: 2022           ambulatory                Saul Jennifer  
Other Phone:   
[phone]                           [facility]   
Professional   
Corporation  
Other Phone:   
[phone]  
   
                          Start: 2022 Telephone encounter Saul Jennifer   [facility]  
 Psychiatry  
   
                                                    Start: 2022  
End: 2022           ambulatory                Saul Jennifer  
Other Phone:   
[phone]                           [facility]   
Professional   
Corporation  
Other Phone:   
[phone]  
   
                          Start: 2022 Telephone encounter Saul Jennifer   [facility]  
 Psychiatry  
   
                                                    Start: 2022  
End: 2022     ambulatory          [facility]  
l  
   
                                                    Start: 2021  
End: 2021     ambulatory          [facility]  
al  
   
                                                    Start: 2021  
End: 2021                         Subsequent hospital   
visit by physician        [facility]  
   
                                        Comment on above:   Vaginal discharge   
   
                                                    Start: 03-  
End: 2021     ambulatory          [facility]V [facility]  
al  
   
                                                    Start: 2021  
End: 2021                         Subsequent hospital   
visit by physician        [facility]  
   
                                        Comment on above:   Abscess, [place]'s gla  
nd;  
Vaginal discharge   
   
                                                    Start: 2021  
End: 2021                         Subsequent hospital   
visit by physician        [facility]  
   
                                        Comment on above:   Vaginal discharge;  
Dysuria   
   
                                                    Start: 2020  
End: 2020                         Subsequent hospital   
visit by physician        [facility]  
   
                                        Comment on above:   Boil of vulva;  
Vaginal discharge   
   
                                                    Start: 10-  
End: 10-                         Emergency department   
patient visit             [facility]   
ED  
   
                                        Comment on above:   Nausea, vomiting and  
 diarrhea (Primary Dx)   
   
                                                    Start: 2020  
End: 2020                         Subsequent hospital   
visit by physician                                  [facility]  
   
                                        Comment on above:   Vaginal odor   
   
                                                    Start: 2020  
End: 2020                         Subsequent hospital   
visit by physician                                  [facility]  
   
                                        Comment on above:   Fever, unspecified f  
ever cause   
   
                                                    Start: 2020  
End: 2020                         Subsequent hospital   
visit by physician                                  [facility]  
   
                                        Comment on above:   Menorrhagia with reg  
ular cycle;  
Screening for cervical cancer   
   
                                                    Start: 2020  
End: 2020                         Subsequent hospital   
visit by physician                                  [facility]  
   
                                        Comment on above:   Encounter for pregna  
ncy test, result unknown   
   
                                                    Start: 2020  
End: 2020                         Subsequent hospital   
visit by physician                                  [facility]  
   
                                        Comment on above:   Encounter for annual  
 routine gynecological examination   
   
                                                    Start: 2017  
End: 2017     Ambulatory          Sarah Purvis    Facility:[facility]  
   
                                                    Start: 2017  
End: 2017     Ambulatory          Sarah Purvis    Facility:[facility]  
  
  
  
Procedures  
  
  
                          Date         Procedure    Procedure Detail Performing   
Clinician  
   
                                        Start: 2023   Streptococcus agalac  
tiae   
culture                                             MD Bj Menjivar  
Work Phone:   
[phone]  
   
                                        Start: 2023   Us preg uterus after  
 1st   
trimest  gestation                             Umm Rico MD  
Work Phone:   
[phone]  
   
                                        Start: 2022   Us preg uterus after  
 1st   
trimest  gestation                             [facility]   
Provider  
   
                                                    Start: 2022  
End: 2022     EKG impression                          Massiel Andre  
   
                                                    Start: 2022  
End: 2022     EKG impression                          Angel Alberts  
   
                                        Start: 2022   Diagnostic ultrasoun  
d of   
gravid uterus                                       MD Bj Menjivar  
Work Phone:   
[phone]  
   
                                        Start: 2022   Transvaginal obstetr  
ic   
ultrasonography                                     MD Bj Menjivar  
Work Phone:   
[phone]  
   
                                        Start: 2022   Adult depression scr  
eening   
assessment                                          Estelle Mackay   
APRN.CNP  
Work Phone:   
[phone]  
   
                                        Start: 2022   Mri pelvis w/o contr  
ast   
material                                            Estelle Mackay   
APRN.CNP  
Work Phone:   
[phone]  
   
                                        Start: 2021   Cul bact xcpt urine   
blood/stool aerobic isol                            Pranav Larsen  
Work Phone:   
[phone]  
   
                                        Start: 2021   Iadna candida specie  
s direct   
probe tq                                            Pranav Larsen  
Work Phone:   
[phone]  
   
                                        Start: 2020   Cul bact xcpt urine   
blood/stool aerobic isol                            Pranav Larsen  
Work Phone:   
[phone]  
   
                                        Start: 2020   Iadna candida specie  
s direct   
probe tq                                            Pranav Larsen  
Work Phone:   
[phone]  
   
                                        Start: 10-   Blood count complete  
   
auto&auto difrntl wbc                               Nain King  
Work Phone:   
[phone]  
   
                                        Start: 10-   Comprehensive metabo  
lic   
panel                                               Nain LIRIANO [employer]  
Work Phone:   
[phone]  
   
                          Start: 10- Urinalysis microscopic only           
     Nain LIRIANO [employer]  
Work Phone:   
[phone]  
   
                                        Start: 10-   Urine pregnancy test  
 visual   
color cmprsn meths                                  [employer]  
Work Phone:   
[phone]  
   
                                        Start: 10-   Urnls dip stick/tabl  
et rgnt   
auto w/o microscopy                                 [employer]ain [employer]  
Work Phone:   
[phone]  
   
                                        Start: 2020   Blood count complete  
   
auto&auto difrntl wbc                               Pranav Larsen  
Work Phone:   
[phone]  
   
                                        Start: 2020   Gonadotropin chorion  
ic   
quantitative                                        Pranav Larsen  
Work Phone:   
[phone]  
   
                                        Start: 2019   Adult depression scr  
eening   
assessment                                          Sima Quinonez MD  
Work Phone:   
[phone]  
   
                                       Mycology culture              MD Bj monroy  
Work Phone:   
[phone]  
   
                                                            Trichomonas vaginali  
s   
detection                                           MD Bj Menijvar  
Work Phone:   
[phone]  
  
  
  
Plan of Treatment  
  
  
                          Date         Care Activity Detail       Author  
   
                                        Start: 2048   Shingles Vaccine (1   
of   
2)                                      Shingles Vaccine (1 of   
2)                                      [employer]  
Work Phone:   
[phone]  
   
                                        Start: 2029   DTaP/Tdap/Td vaccine  
 (8   
- Td)                                   DTaP/Tdap/Td vaccine (8   
- Td)                                   [employer]  
Work Phone:   
[phone]  
   
                                        Start: 2023   Adult depression   
screening assessment      DEPRESSION SCREENING      [facility]  
   
                          Start: 2023                           [facility]  
   
                          Start: 2023 Hospital admission              [facility]  
   
                          Start: 2023                           [facility]  
   
                          Start: 2023 Hospital admission              [facility]  
   
                                        Start: 2023   Group B Streptococcu  
s   
Culture                                 Group B Streptococcus   
Culture                                 [facility]  
   
                                        Start: 2023   Screening for malign  
ant   
neoplasm of cervix        Cervical cancer screen    [facility]  
   
                          Start: 2023                           [facility]  
   
                          Start: 2023 Hospital admission              [facility]  
   
                          Start: 2023                           [facility]  
   
                          Start: 2023 Hospital admission              [facility]  
   
                          Start: 2023 Cervical cancer screen Cervical canc  
[facility] screen [facility]  
   
                          Start: 2023 DEPRESSION ASSESSMENT DEPRESSION ASS  
ESSMENT [facility]  
   
                                                    Start: 2022  
End: 2023           OBSTETRIC ULTRASOUND [facility]  OBSTETRIC ULTRASOUND   
[facility] Anc Imaging Routine   
Encounter for follow-up   
ultrasound of fetal   
anatomy Expected:   
2022, Expires:   
2023                              [facility]   
[facility]  
Work Phone:   
[phone]  
   
                                        Comment on above:   Expected: 2022  
, Expires: 2023   
   
                                Start: 2022 Depression      Depression Mandeep  
e:   
17-Sep-2022                             [facility]  
   
                                Start: 2022 Psychological assessment Psych  
ological   
assessment Date:   
17-Sep-2022                             [facility]  
   
                                                    Start: 2022  
End: 2023                                             [facility]  
   
                          Start: 2022                           [facility]  
Work Phone:   
[phone]  
   
                          Start: 2022 Influenza vaccination              [facility]  
   
                                                    Start: 2022  
End: 2022                         Patient encounter   
procedure                 [facility]   
[facility]  
   
                                                    Start: 2022  
End: 2022                         Emergency department   
patient visit             Departed Emergency        [facility]   
Ctr-Emergency Room  
   
                                                    Start: 08-  
End: 08-                         Patient encounter   
procedure                 [facility]   
[facility]  
   
                                        Start: 2022   Screening for Chlamy  
марина   
trachomatis               Chlamydia screen          [facility]  
Work Phone:   
[phone]  
   
                                        Start: 2022   COVID-19 VACCINE (3   
-   
Booster for Pfizer   
series)                                 COVID-19 VACCINE (3 -   
Booster for Pfizer   
series)                                 [facility]  
   
                                        Start: 2022   Screening for Chlamy  
марина   
trachomatis               Chlamydia screen          [facility] [place]  
   
                          Start: 2022 DEPRESSION ASSESSMENT DEPRESSION ASS  
ESSMENT [facility]  
   
                                        Start: 2021   Screening for Chlamy  
марина   
trachomatis               Chlamydia screen          [facility] [place]  
   
                                        Start: 10-   COVID-19 VACCINE (3   
-   
Booster for Pfizer   
series)                                 COVID-19 VACCINE (3 -   
Booster for Pfizer   
series)                                 [facility]  
   
                                                    Start: 2021  
End: 2021           Telemedicine              2021 Telemedicine   
Obstetrics and   
Gynecology Pranav Larsen, APRN - [facility] [address]   
[address] [place] [id number]   
[phone] (Fax)                      [facility] OBSTETRICS   
& GYNECOLOGY  
   
                          Start: 2021 Chlamydia screen Chlamydia screen Me  
[place], [place]  
   
                                        Start: 2021   Screening for Chlamy  
марина   
trachomatis               Chlamydia screen          [place], [place]  
   
                          Start: 2021 Influenza vaccination              M  
[facility]  
[employer] Phone:   
[phone]  
   
                                        Comment on above:   Postponed from  (Patient Refused)   
   
                                                            Postponed from  (Patient Refused)   
   
                                        Start: 2020   Adult depression   
screening assessment      DEPRESSION SCREENING      [facility]  
   
                          Start: 2020 Influenza vaccination Flu vaccine (#  
1) [place]  
   
                          Start: 2020 Chlamydia screen Chlamydia screen [facility] Phone:   
[phone]  
   
                                        Comment on above:   Postponed from  (Not Indicated)   
   
                                        Start: 07-   HPV vaccine (1 - 2-d  
ose   
series)                                 HPV vaccine (1 - 2-dose   
series)                                 [place]  
   
                                        Comment on above:   Postponed from  (Not Indicated)   
   
                                        Start: 2020   Varicella vaccine (2  
 of   
2 - 2-dose childhood   
series)                                 Varicella vaccine (2 of   
2 - 2-dose childhood   
series)                                 [place]  
   
                                        Comment on above:   Postponed from  (Not Indicated)   
   
                                        Start: 2020   HPV vaccine (1 - 2-d  
ose   
series)                                 HPV vaccine (1 - 2-dose   
series)                                 [place]  
   
                                        Comment on above:   Postponed from  (Not Indicated)   
   
                                        Start: 2020   HPV vaccine (1 - Fem  
jojo   
2-dose series)                          HPV vaccine (1 - Female   
2-dose series)                          [facility] Phone:   
[phone]  
   
                                        Comment on above:   Postponed from  (Not Indicated)   
   
                                                    Start: 2020  
End: 2020           Office Visit              2020 Office Visit   
Obstetrics and   
Gynecology Pranav Larsen, APRN - CN [address]   
[address]   
[place] [address]   
[phone] (Fax)                      [facility] OBSTETRICS   
& GYNECOLOGY  
   
                                        Start: 2020   Varicella vaccine (2  
 of   
2 - 2-dose childhood   
series)                                 Varicella vaccine (2 of   
2 - 2-dose childhood   
series)                                 [facility]  
[employer] Phone:   
[phone]  
   
                                        Comment on above:   Postponed from  (Not Indicated)   
   
                                                    Start: 2020  
End: 2020           Procedure visit           2020 Procedure   
visit Obstetrics and   
Gynecology Pranav Larsen, APRN - CNM [address]   
[address] [place] [address]   
[place] [address] [phone] (Fax)                      [facility] OBSTETRICS   
& GYNECOLOGY  
   
                          Start: 2019 Cervical cancer screen Cervical canc  
er screen [facility]  
[employer] Phone:   
[phone]  
   
                          Start: 2019 PAP TESTING  PAP TESTING  [facility]  
   
                                        Start: 2017   Urine microalbumin   
profile                   DTAP,TDAP,TD (1 - Tdap)   [facility]  
   
                                        Start: 2016   CHLAMYDIA SCREENING   
(18-24)                                 CHLAMYDIA SCREENING   
(18-24)                                 [facility]  
   
                                        Start: 2016   GC (GONORRHEA) SCREE  
SHANELL   
(18-24)                                 GC (GONORRHEA)   
SCREENING (18-24)                       [facility]  
   
                          Start: 2016 HEPATITIS C SCREENING HEPATITIS C SC  
REENING [facility]  
   
                          Start: 2016 HIV SCREENING HIV SCREENING [facility]  
   
                                        Start: 2012   PEDS TO ADULT TRANSI  
TION   
ANNUAL ASSESSMENT                       PEDS TO ADULT   
TRANSITION ANNUAL   
ASSESSMENT                              [facility]  
   
                                        Start: 2010   PEDS TO ADULT TRANSI  
TION   
INITIAL DISCUSSION                      PEDS TO ADULT   
TRANSITION INITIAL   
DISCUSSION                              [facility]  
   
                                        Start: 2009   HPV vaccine (1 - 2-d  
ose   
series)                                 HPV vaccine (1 - 2-dose   
series)                                 [facility]  
   
                                        Start: 2008   MENINGOCOCCAL B:   
Consider based on risk   
(1 of 2 - Risk Bexsero   
2-dose series)                          MENINGOCOCCAL B:   
Consider based on risk   
(1 of 2 - Risk Bexsero   
2-dose series)                          [facility]  
   
                                        Start: 2002   Varicella vaccine (2  
 of   
2 - 2-dose childhood   
series)                                 Varicella vaccine (2 of   
2 - 2-dose childhood   
series)                                 [facility]  
   
                                        Start: 1998   HEPATITIS B (1 of 3   
-   
3-dose series)                          HEPATITIS B (1 of 3 -   
3-dose series)                          [facility]  
   
                          Start: 1998 Hepatitis C screening Hepatitis C sc  
reen [place]  
   
                                                            Bacteria identified   
in   
Genital specimen by   
Aerobe culture                                      [facility]  
Work Phone:   
[phone]  
   
                                                            Bacteria identified   
in   
Urine by Culture                                    [facility]  
   
                                                      
End: 2020                         C.trachomatis   
N.gonorrhoeae DNA                       C.trachomatis   
N.gonorrhoeae DNA   
Microbiology Routine   
Vaginal discharge 1   
Occurrences starting   
2020 until   
2020                              [place]  
   
                                        Comment on above:   1 Occurrences starti  
ng 2020 until 2020   
   
                                                            C.trachomatis   
N.gonorrhoeae DNA                                   [place]  
   
                                                      
End: 2021                         C.trachomatis   
N.gonorrhoeae DNA                       C.trachomatis   
N.gonorrhoeae DNA   
Microbiology Routine   
Abscess, Due West's gland   
1 Occurrences starting   
2021 until   
2021                              [place]  
   
                                        Comment on above:   1 Occurrences starti  
ng 2021 until 2021   
   
                                                      
End: 2021                         C.trachomatis   
N.gonorrhoeae DNA                       C.trachomatis   
N.gonorrhoeae DNA   
Microbiology Routine   
Vaginal discharge 1   
Occurrences starting   
2021 until   
2021                              [place]  
   
                                        Comment on above:   1 Occurrences starti  
ng 2021 until 2021   
   
                                                      
End: 2020                         C.trachomatis   
N.gonorrhoeae DNA, Thin   
Prep                                    C.trachomatis   
N.gonorrhoeae DNA, Thin   
Prep Microbiology   
Routine Menorrhagia   
with regular cycle 1   
Occurrences starting   
2020 until   
2020                              [place]  
   
                                        Comment on above:   1 Occurrences starti  
ng 2020 until 2020   
   
                                                            C.trachomatis   
N.gonorrhoeae DNA, Thin   
Prep                                    C.trachomatis   
N.gonorrhoeae DNA, Thin   
Prep Microbiology   
Routine Menorrhagia   
with regular cycle   
2020 11:10 AM EST                 [place]  
   
                                                            Comprehensive metabo  
lic   
2000 panel - Serum or   
Plasma                                              [facility]  
   
                                                            CT Abdomen and Pelvi  
s W   
contrast IV                                         [facility]  
   
                                                      
End: 2020           Culture, Genital          Culture, Genital   
Microbiology Routine   
Vaginal odor 1   
Occurrences starting   
2020 until   
2020                              [place]  
   
                                        Comment on above:   1 Occurrences starti  
ng 2020 until 2020   
   
                                       Culture, Genital              [facility] [place], [place]  
   
                                                      
End: 2021           Culture, Genital          Culture, Genital   
Microbiology Routine   
Vaginal discharge 1   
Occurrences starting   
2021 until   
2021                              [employer]  
Work Phone:   
[phone]  
   
                                        Comment on above:   1 Occurrences starti  
ng 2021 until 2021   
   
                                                      
End: 2021           Culture, Genital          Culture, Genital   
Microbiology Routine   
Vaginal discharge 1   
Occurrences starting   
2021 until   
2021                              [facility] [place]  
   
                                        Comment on above:   1 Occurrences starti  
ng 2021 until 2021   
   
                                                      
End: 2021           Culture, Urine            Culture, Urine   
Microbiology Routine   
Dysuria 1 Occurrences   
starting 2021   
until 2021                        [facility] [place]  
   
                                        Comment on above:   1 Occurrences starti  
ng 2021 until 2021   
   
                                                Culture, Urine  Culture, Urine   
Microbiology Routine   
Dysuria 2021 3:36   
PM EST                                  [facility], [place]  
   
                                       Culture, Wound              [facility], [place]  
   
                                                      
End: 2020                         Cytopathology procedure,   
preparation of smear,   
genital source                                      [facility]  
Work Phone:   
[phone]  
   
                                        Comment on above:   1 Occurrences starti  
ng 2020 until 2020   
   
                                                      
End: 2020                         Cytopathology procedure,   
preparation of smear,   
genital source                          [facility] Routine   
Screening for cervical   
cancer 1 Occurrences   
starting 2020   
until 2020                        [facility] [place] [place]  
   
                                        Comment on above:   1 Occurrences starti  
ng 2020 until 2020   
   
                                                      
End: 2023                         MRI PELVIS ORTHO GENERAL   
WO IVCON                                MRI PELVIS ORTHO   
GENERAL WO IVCON   
Radiology Routine Bone   
lesion Disorder of bone   
1 Occurrences starting   
2022 until   
2023                              [facility]  
Work Phone:   
[phone]  
   
                                        Comment on above:   1 Occurrences starti  
ng 2022 until 2023   
   
                                       Patient Education              [facility]  
Work Phone:   
[phone]  
   
                                       Patient referral              [facility]  
Work Phone:   
[phone]  
   
                                                      
End: 2021           VAGINITIS DNA PROBE       VAGINITIS DNA PROBE   
Microbiology Routine   
Vaginal discharge 1   
Occurrences starting   
2021 until   
2021                              [facility], [place]  
   
                                        Comment on above:   1 Occurrences starti  
ng 2021 until 2021   
   
                                                VAGINITIS DNA PROBE VAGINITIS DN  
A PROBE   
Microbiology Routine   
Vaginal discharge   
2021 3:36 PM EST                  [facility], [place]  
   
                                                                 [facility]  
c  
   
                                                                 [facility]  
c  
   
                                                                 [facility]  
c  
   
                                                                 [facility]  
  
  
  
Immunizations  
  
  
                      Immunization Date Immunization Notes      Care Provider Fa  
cility  
   
                                        2021          COVID-19 mRNA, Comir  
chris   
(Pfizer)                                                    [facility]  
   
                                        2021          COVID-19 mRNA, Comir  
chris   
(Pfizer)                                                    [facility]  
   
                                        2021          COVID-19 mRNA, Comir  
chris   
(Pfizer)                                                    [facility]  
   
                                        2019          tetanus toxoid, redu  
alyse   
diphtheria toxoid, and   
acellular pertussis   
vaccine, adsorbed                                           [facility] [employer] Phone:   
[phone]  
   
                                        2016          meningococcal   
oligosaccharide (groups   
A, C, Y and W-135)   
diphtheria toxoid   
conjugate vaccine (MCV4O)                                         [facility]  
   
                                                    NEGATED: Highlighted   
row has not   
occurred!2019                     measles, mumps and   
rubella virus vaccine                                         [facility] Phone:   
[phone]  
  
  
  
Payers  
  
  
                          Date         Payer Category Payer        Policy ID  
   
                          2023   Medicaid                  [id number]   
[id number]  
[id number]  
   
                          2023   Self-pay                  [id number]  
[id number]  
[id number]  
   
                                2021      Medicaid        [employer]  
AID   
[employer]   
[id number]   
2021-Present   
[phone] [address]   
[place] [address] Medicaid               [id number]   
[id number]  
[id number]  
   
                          2021   Medicaid                  [id number]  
[id number]  
[id number]  
   
                                2021      Unknown         [employer]HE[employer] BLUE CARD  
 PPO OOS   
[id number]   
2021-Present   
[phone]  [address] [place] [address]   
PPO                                     [id number]   
[id number]  
[id number]  
   
                                        2019          [facility] of [employer]  
e   
( and others)                    [facility]   
xxxxxxxxxxx   
2019-Present                        [id number]   
[id number]  
[id number]  
   
                                        2019          [employer]  
e   
( and others)                                [id number]   
[id number]  
[id number]  
   
                          2017   Unknown                     
   
                          1998   Unknown                   [id number]   
[id number]  
[id number]  
   
                          1998   Unknown                   [id number]   
[id number]  
[id number]  
   
                          1998   Unknown                   [id number]   
[id number]  
[id number]  
   
                          1998   Unknown                   [id number]   
[id number]  
[id number]  
   
                          1998   Unknown                   [id number]   
[id number]  
[id number]  
   
                          1998   Unknown                   [id number]   
[id number]  
[id number]  
   
                          1998   Unknown                   [id number]   
[id number]  
[id number]  
   
                          1998   Unknown                   [id number]   
[id number]  
[id number]  
   
                          1960   Unknown                   [id number]  
   
                          1960   Unknown                   [id number]  
   
                          1960   Unknown                   [id number]  
   
[id number]  
[id number]  
   
                                                            Department of [facility]   
( and others)                                [id number]   
[id number]  
[id number]  
   
                                       Unknown      [employer] [id number]   
[id number]  
[id number]  
   
                                       Unknown                   [id number]   
[id number]  
[id number]  
   
                                       Unknown                   [id number]   
[id number]  
[id number]  
   
                                       Unknown                   [id number]   
[id number]  
[id number]  
   
                                       Unknown                   [id number]   
[id number]  
[id number]  
   
                                       Unknown                   [id number]   
[id number]  
[id number]  
   
                                       Unknown                   [id number]   
[id number]  
[id number]  
   
                                       Unknown      [facility]          [id number]   
[id number]  
[id number]  
  
  
  
Social History  
  
  
                          Date         Type         Detail       Facility  
   
                                                    Start: 2020  
End: 2024                         Tobacco smoking   
status NHIS               Never smoker              [facility]  
   
                                                    Start: 2020  
End: 2021           Alcohol intake            Current non-drinker of   
alcohol (finding)                       [employer] Phone:   
[phone]  
   
                          Start: 2000 Sex Assigned At Birth Not on file  M  
[employer] Phone:   
[phone]  
   
                                                    Start: 2019  
End: 10-                         Tobacco use and   
exposure                  Never used                [facility]  
   
                                                            Exposure to   
SARS-CoV-2 (event)        Unable to assess          [facility]  
   
                                                    Start: 2022  
End: 11-                         Exposure to   
SARS-CoV-2 (event)        Not sure                  [facility]  
   
                                                    Start: 02-  
End: 2022     Alcohol intake      Ex-drinker (finding) [facility]  
   
                                       Sex Assigned At Birth Sex Assigned At Bir  
th [facility]   
[employer]  
Other Phone:   
[phone]  
   
                                        Start: 2022   Tobacco smoking   
status NHIS               Smoker (finding)          [facility]  
   
                          Start: 2000 Sex Assigned At Birth Female       F  
[facility]  
   
                                                                Tobacco smoking   
consumption unknown                     [facility]  
   
                          Start: 2022              Pregnant     [facility]  
  
  
  
Medical Equipment  
  
  
                                Procedure Code  Equipment Code  Equipment Origin  
al   
Text                                    Equipment   
Identifier                              Dates  
   
                                                                  Start:   
12-  
   
                                        Comment on above:   TEST BLOOD SUGARS 2   
TIMES DAILY   
   
                                                            1 Each twice daily.   
  
  
  
Goals  
  
  
                                Date            Patient Goal    Desired Activity  
/State  
   
                                                                  
  
  
  
Functional Status  
  
  
                          Date         Assessment   Result       Facility  
   
                          2023   Functional status Patient at Baseline [facility]  
Work Phone: [phone]  
   
                                                    Functional observable [facility]  
  
  
  
Mental Status  
  
  
                          Date         Assessment   Result       Facility  
   
                                2023      Cognitive function Cognitive Sta  
tus Patient at   
Baseline                                [facility]   
Ctr  
Work Phone: [phone]  
   
                                2022                      Cognitive functi  
ons   
[id number]                        [facility]  
  
  
  
Clinical Notes 2019 to 12-  
Umm Rico MD - 12/15/2022 1:27 PM ESTTelephone Encounter - Carol Aparicio RN  
 - 2022 9:27 AM ESTTelephone Encounter - Leslie Veloz RN - 10/18/2022 2:55 
PM EDT  
  
                                Note Date & Type Note            Facility  
   
                                        12- Note     HNO ID: [id number]  
Author: Umm Rico MD  
Service: ?  
Author Type: Physician  
Type: Progress Notes  
Filed: 12/15/2022 2:33 PM  
Note Text:  
OBSTETRICS  
MATERNAL FETAL MEDICINE CONSULT  
SERVICE DATE: December 15, 2022  
SERVICE TIME: 1345  
REQUESTING PROVIDER: Dr. Duffy  
Subjective  
HISTORY OF THE PRESENT ILLNESS:   
The patient is a 22 year old   
female, ,  
who is at 18w4d with an DEB of   
2023, by Last Menstrual   
Period dating  
method. Patient is here for a   
history of a late    
delivery and a  
background history significant   
for spinal mass.  
HISTORY REVIEW  
PAST MEDICAL HISTORY  
Diagnosis Date  
Depression  
Gestational diabetes  
H/O pre-term labor  
Post partum depression  
PAST SURGICAL HISTORY  
Procedure Laterality Date  
CHOLECYSTECTOMY  
KNEE ARTHROSCOPY  
FAMILY HISTORY  
Problem Relation Age of Onset  
Hypertension Maternal   
Grandmother  
Hyperlipidemia Maternal   
Grandfather  
Hypertension Maternal   
Grandfather  
Heart Maternal Grandfather  
other (Lung Cancer) Maternal   
Grandfather  
Social History  
Tobacco Use  
Smoking status: Never  
Smokeless tobacco: Never  
Vaping Use  
Vaping Use: Never used  
Substance Use Topics  
Alcohol use: Not Currently  
Drug use: Never  
Obstetric History  
 T0  L0  
SAB0 IAB0 Ectopic0 Multiple0   
Live Births0  
Name of Baby 1: Not recorded  
Date: Not recorded GA: Not   
recorded Delivery: Not recorded  
Apgar1: Not recorded Apgar5: Not   
recorded  
Living: Not recorded  
Active Non-Hospital Problems  
Diagnosis Date Noted  
Reactive hypoglycemia 12/10/2019  
History of gestational diabetes   
mellitus (GDM) 12/10/2019  
Normal delivery 2019  
Night sweats 2019  
Diarrhea 2019  
Headache 2019  
ALLERGIES  
Allergen Reactions  
Hydrocodone-Acetami* Mental   
Status Change  
PRIOR TO ADMISSION MEDICATIONS:  
Cannot display prior to   
admission medications because   
the patient has not  
been admitted in this contact.  
REVIEW OF SYSTEMS:  
The remainder of the review of   
systems is negative.  
Objective  
LAST VITALS:  
Pulse BP Resp O2 Sat Temp Pain  
77 117/50  
HT/WT/BMI:  
Height Weight BMI  
5' 8  (172.7 cm) 172 lb (78 kg)   
26.15  
PRENATAL LABS  
Diagnostic tests reviewed for   
today's visit: Most recent labs   
and imaging  
results.  
Impression/Recommendations  
22 year old  EGA:18w4d.  
PROBLEM LIST  
History of late  delivery  
Today's discussion centered   
around the risk of    
delivery and  
treatment. I explained that Ms. Shameka Figueredo does have an   
increased risk  
for  delivery in a   
subsequent pregnancy of up to   
30% (although this  
recurrent risk is based mostly   
on <34w deliveries). The   
causative factors  
for  delivery are   
numerous with many being related   
to a subclinical  
inflammation/infection and   
cervical insuffiencey only being   
the culprit in  
a minority of cases. Ba  
We then turned towards   
surveillance, in particular   
cervical length  
screening, I explained that as   
Ms. Shameka Figueredo had delivered   
after 34w,  
serial CL screening has not been   
shown to be effective in   
predicting  
 delivery. However, it   
has been shown that a single   
cervical length  
measurements in women with no   
prior history of    
delivery does  
appear to have a significant   
predictive effect. Based on this   
and an  
elevated risk I have suggested   
modified cervical length   
protocol.  
We then turned towards 17OHP and   
I explained that while initial   
studies  
have shown had shown some effect   
in reducing  delivery,   
recent  
data does not seem to support   
this. Of note ACOG does   
recommend discussing  
this treatment modality..  
Based on the above I have   
recommended a cervical length   
today at her  
anatomic and then to return at   
22w to complete the anatomic and   
repeat the  
cervical length. I explained   
that if there were any signs of   
shortening I  
would recommend vaginal   
progesterone as this has shown   
to reduce the  
incidence of  deliveries.  
Recommendations  
Cervical length and today's   
anatomy and follow up cervical   
length in 4  
weeks  
- if cervical length <2cm would   
initiate vaginal progesterone  
- if cervical length <1-1.5cm   
would consider cerclage  
2. Spinal mass  
Ms. Shameka Figueredo has been seen for   
a spinal mass and has been   
followed by  
neurosurgery. I have recommended   
an anesthesia consultation in   
the third  
trimester as this finding could   
affect her ability to get   
regional  
anesthesia.  
Recommendations  
Anesthesia consultation in the   
third trimester  
Many thanks for this interesting   
consult.  
Umm Rico  
Staff Physician,  
Division of Maternal Fetal   
Medicine,  
[facility]  
I spent 45 minutes in the visit,   
with more than 50% of the total  
face-to-face time of the visit   
in counseling / coordination of   
care.  
A copy of this consultation will   
be forwarded to Ms. Shameka Figueredo   
's  
provider via EMR and/or Fax.  
SIGNATURE: Umm Rico MD   
PATIENT NAME: Shameka Figueredo  
DATE: December 15, 2022 MRN:   
[id number]  
TIME: 1:30 PM                           [facility]  
   
                                                    12- History of Presen  
t   
illness Narrative                       Formatting of this note is   
different from the original.  
OBSTETRICS  
MATERNAL FETAL MEDICINE CONSULT  
  
SERVICE DATE: December 15, 2022  
SERVICE TIME: 2425  
REQUESTING PROVIDER: Dr. Duffy  
  
Subjective  
HISTORY OF THE PRESENT ILLNESS:   
The patient is a 22 year old   
female, , who is at 18w4d   
with an DEB of 2023, by   
Last Menstrual Period dating   
method. Patient is here for a   
history of a late    
delivery and a background   
history significant for spinal   
mass.  
  
HISTORY REVIEW  
PAST MEDICAL HISTORY  
Diagnosis Date  
Depression  
Gestational diabetes  
H/O pre-term labor  
Post partum depression  
  
PAST SURGICAL HISTORY  
Procedure Laterality Date  
CHOLECYSTECTOMY  
KNEE ARTHROSCOPY  
  
FAMILY HISTORY  
Problem Relation Age of Onset  
Hypertension Maternal   
Grandmother  
Hyperlipidemia Maternal   
Grandfather  
Hypertension Maternal   
Grandfather  
Heart Maternal Grandfather  
other (Lung Cancer) Maternal   
Grandfather  
  
Social History  
  
Tobacco Use  
Smoking status: Never  
Smokeless tobacco: Never  
Vaping Use  
Vaping Use: Never used  
Substance Use Topics  
Alcohol use: Not Currently  
Drug use: Never  
  
Obstetric History  
 T0  L0  
SAB0 IAB0 Ectopic0 Multiple0   
Live Births0  
  
Name of Baby 1: Not recorded  
Date: Not recorded GA: Not   
recorded Delivery: Not recorded  
Apgar1: Not recorded Apgar5: Not   
recorded  
Living: Not recorded  
  
Active Non-Hospital Problems  
Diagnosis Date Noted  
Reactive hypoglycemia 12/10/2019  
History of gestational diabetes   
mellitus (GDM) 12/10/2019  
Normal delivery 2019  
Night sweats 2019  
Diarrhea 2019  
Headache 2019  
  
ALLERGIES  
Allergen Reactions  
Hydrocodone-Acetami* Mental   
Status Change  
  
PRIOR TO ADMISSION MEDICATIONS:  
Cannot display prior to   
admission medications because   
the patient has not been   
admitted in this contact.  
  
REVIEW OF SYSTEMS:  
The remainder of the review of   
systems is negative.  
  
Objective  
  
LAST VITALS:  
Pulse BP Resp O2 Sat Temp Pain  
77 117/50  
  
HT/WT/BMI:  
Height Weight BMI  
5' 8  (172.7 cm) 172 lb (78 kg)   
26.15  
  
PRENATAL LABS  
Diagnostic tests reviewed for   
today's visit: Most recent labs   
and imaging results.  
  
Impression/Recommendations  
22 year old  EGA:18w4d.  
PROBLEM LIST  
History of late  delivery  
Today's discussion centered   
around the risk of    
delivery and treatment. I   
explained that Ms. Shameka Figueredo   
does have an increased risk for   
 delivery in a subsequent   
pregnancy of up to 30% (although   
this recurrent risk is based   
mostly on <34w deliveries). The   
causative factors for    
delivery are numerous with many   
being related to a subclinical   
inflammation/infection and   
cervical insuffiencey only being   
the culprit in a minority of   
cases. Ba  
  
We then turned towards   
surveillance, in particular   
cervical length screening, I   
explained that as Ms. Shameka Figueredo   
had delivered after 34w, serial   
CL screening has not been shown   
to be effective in predicting   
 delivery. However, it   
has been shown that a single   
cervical length measurements in   
women with no prior history of   
 delivery does appear to   
have a significant predictive   
effect. Based on this and an   
elevated risk I have suggested   
modified cervical length   
protocol.  
  
We then turned towards 17OHP and   
I explained that while initial   
studies have shown had shown   
some effect in reducing    
delivery, recent data does not   
seem to support this. Of note   
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 the general 
population and indicates half of respondents had a worse score.  
  
Depression Screening:  
PHQ-9 2022  
Score 4  
  
PHQ-9 Self Harm 2022  
Question 9 Not at all  
  
PHQ-9 Self-Harm (Item 9) response options:  
0 Not at all  
1 Several days  
2 More than half the days  
3 Nearly every day  
  
PHQ-9 Levels:  
0-4 No - mild depression  
5-9 Mild depression  
10-14 Moderate depression  
15-19 Moderately severe depression  
20-27 Severe depression  
  
ACTIVE PROBLEM LIST  
Normal Delivery  
Night Sweats  
Diarrhea  
Headache  
Reactive Hypoglycemia  
History of Gestational Diabetes Mellitus (Gdm)  
  
PAST MEDICAL HISTORY  
Diagnosis Date  
Depression  
Gestational diabetes  
H/O pre-term labor  
Post partum depression  
  
PAST SURGICAL HISTORY  
Procedure Laterality Date  
CHOLECYSTECTOMY  
KNEE ARTHROSCOPY  
  
Social History  
  
Tobacco Use  
Smoking status: Never Smoker  
Smokeless tobacco: Never Used  
Vaping Use  
Vaping Use: Never used  
Substance Use Topics  
Alcohol use: Not Currently  
Drug use: Never  
  
FAMILY HISTORY  
Problem Relation Age of Onset  
Hypertension Maternal Grandmother  
Hyperlipidemia Maternal Grandfather  
Hypertension Maternal Grandfather  
Heart Maternal Grandfather  
other (Lung Cancer) Maternal Grandfather  
  
ALLERGIES  
Allergen Reactions  
Hydrocodone-Acetami* Mental Status Change  
  
CURRENT MEDICATIONS:  
  
DULoxetine (CYMBALTA) 60 mg capsule Take 60 mg by mouth once daily.  
blood sugar diagnostic (FREESTYLE LITE STRIPS) test strip TEST BLOOD SUGARS 2 
TIMES DAILY  
lancets (FREESTYLE LANCETS) 28 gauge 1 Each twice daily.  
metFORMIN ER (GLUCOPHAGE XR) 750 mg 24 hr tablet Take 1 tablet by mouth daily 
with breakfast.  
ARIPiprazole (ABILIFY) 15 mg tablet Take 1 tablet by mouth once daily.  
sertraline (ZOLOFT) 100 mg tablet Take 1.5 tablets by mouth once daily.  
traZODone (DESYREL) 50 mg tablet Take 1 tablet by mouth daily at bedtime.  
venlafaxine ER (EFFEXOR XR) 75 mg 24 hr capsule Take 1 capsule by mouth once 
daily.  
Cranberry 400 mg cap Take 1 capsule by mouth once daily.  
  
REVIEW OF SYSTEMS:  
PAIN ASSESSMENT: See HPI.  
GENERAL: Denies fever, chills malaise and weight loss.  
HEENT: No recent change in vision or hearing.  
CARDIOVASCULAR: Denies chest pain, history of A-fib, valvular disease, or 
pacemaker/ICD.  
RESPIRATORY: Denies SOB, sputum production, and hemoptysis.  
GI: Denies GI ulcers, inflammatory disease, or liver disease.  
: Denies change in frequency or urgency, kidney disease, and burning with 
urination.  
MUSCULOSKELETAL: Positive for See HPI  
SKIN: Denies rash or itching.  
PSYCHOLOGICAL: Denies uncontrolled depression or anxiety.  
NEURO: Denies CVA, seizures, headaches.  
ENDOCRINE: Denies diabetes, thyroid disease.  
HEMATOLOGY/LYMPHOLOGY: Denies cancer, bleeding or clotting disorders, anemia,and
 DVT's.  
ALLERGIC/IMMUNOLOGICAL: Denies risks for infection, or recent MRSA infections.  
  
OBJECTIVE:  
PHYSICAL EXAM  
LMP 2022 (Approximate)  
  
GENERAL APPEARANCE: Well appearing, well-hydrated, well nourished and alert  
SKIN: Head, neck, trunk, and extremities dry, intact and without lesions  
LUNGS: even and non-labored breathing, normal chest excursion  
NEURO/PSYCH: oriented to time, place, and person, speech normal, mental status 
intact  
  
Neuro Tests:  
None  
  
Data Review:  
[facility] records independently reviewed  
Outside records independently reviewed, no imaging available  
  
CT Lumbar 21 (report only) : No acute fracture or malalignment. Pain 
persists, MRI would be ofadditional benefit.  
All CT scans at this facility use dose modulation, iterative reconstruction, 
and/or weight based dosing when appropriate to reduce radiation dose to as low 
as reasonably achievable.  
MRI Lumbar 10/21/21: report only. schmorls node L3  
  
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  
  
CT Lumbar 22: Interval increase in size of two lucent lesions, one within 
the inferior endplateof L3 and another within the left iliac bone. These may 
represent foci of osteomyelitis, although aneoplastic process such as Langerhans
 cell histiocytosis may also have this appearance.  
  
CT Cervical 22: Reversal of the cervical lordosis with no evidence of acute 
bony abnormality inthe cervical spine..  
  
Labs 22:  
ESR 13  
CRP 0.3  
CBC: WBC- 5.6  
  
MRI Thoracic/lumbar w/wo contrast 22: Indeterminate lesion in the left iliac
 wing along the posterior-medial  
margin of the left SI joint. Signal characteristics suggest partially  
fluid contents and there is some enhancement after gadolinium and likely  
at least some edema in the surrounding bone. No clear evidence for  
contiguous extension into the adjacent soft tissues. The intrinsic  
internal contents and presence of enhancement does raise question of  
possible inflammation or infection locally.  
There is no evidence for vertebral body osteomyelitis or septic facet  
arthropathy elsewhere involving the thoracic or lumbar spine on this exam.  
Focal endplate defect inferior L3 represents a Schmorl's node.  
Cord is grossly normal. No abnormal enhancement. Normally patent  
thoracic and lumbar spinal canal and neural foramina.  
  
Bone biopsy surgical pathology 22: Bone (left posterior iliac bone), 
biopsy:  
- Cancellous bone with edema, mild chronic inflammation, and focal fibrosis.  
  
ASSESSMENT/PLAN  
Sacroiliac joint pain (primary encounter diagnosis)  
Chronic bilateral low back pain without sciatica  
Bone lesion  
  
Patient with improved overall back pain. Still gets some pain but not nearly as 
limiting as it was prior. Only taking occasional OTC Ibuprofen prn. Still 
encourage establishing with local PT for program. Can consider repeat injection 
in the future if needed.  
  
1. Imaging: repeat MRI before end of year.  
2. Physical Therapy: consult PT Allen Saez  
3. Medication: NSAIDs prn  
4. Referrals: PT  
5. Considerations: bilateral SI jt inj  
6. Follow up: follow up after MRI or sooner if needed.  
  
I spent a total of 18 minutes on the date of the service which included 
preparing to see the patient, face-to-face patient care, completing clinical 
documentation, obtaining and/or reviewing separately obtained history, 
counseling and educating the patient/family/caregiver, ordering medications, renée
ts, or procedures and communicating results to the patient/family/caregiver.  
  
  
  
  
Electronically signed by DENVER Franco.CNP at 2022 5:02 PM EDT  
  
  
documented in this encounterSelect [facility]- Miscellaneous Notes* 
  Telephone Encounter - Lesvia Rocha - 2022 10:56 AM EDT  
  
Formatting of this note might be different from the original.  
Spoke to pt and scheduled biopsy for 22.  
  
  
  
Electronically signed by Lesvia Rocha at 2022 10:56 AM EDT  
  
  
* Telephone Encounter - Shelly Merino DO - 2022 4:31 PM EDT  
  
Formatting of this note might be different from the original.  
RADIOLOGIST REQUEST / APPROVAL FORM  
  
STAFF RADIOLOGIST:Dr Byrne  
  
PROCEDURE TO BE DONE UNDER: CT  
PROCEDURE REQUESTED: CORE Requested  
  
PROCEDURE: Approved  
TIME SLOT NEEDED: 1 Hour  
  
NOTES: left bone lesion/posterior iliac spine; refer to MRI pelvis 22. 
Lesion is indeterminate although signal characteristics most suggestive of 
abscess given history or prior MSSA discitis.  
  
SPECIAL LABS/ PROCESSING: None  
  
Pre-procedure labs:  
  
CBC: not needed  
  
INR: not needed  
  
COVID: not needed  
  
SIR Bleeding risk category for this procedure: low low risk.  
  
Reference from [facility] :  
  
[email or web address]  
  
STAFF SIGNATURE: Shelly Merino DO  
DATE: May 16, 2022  
TIME: 4:31 PM  
  
  
  
  
  
Electronically signed by Shelly Merino DO at 2022 4:33 PM EDT  
  
  
* Telephone Encounter - Madelin Veronica LPN - 2022 11:47 AM EDT  
  
Formatting of this note is different from the original.  
BX. COORDINATOR INFORMATION  
LAB RESULTS:  
PT INR (no units)  
Date Value  
2019 1.0  
  
APTT (sec)  
Date Value  
2019 29.2  
  
Platelet Count (k/uL)  
Date Value  
2022 237  
  
  
Current Outpatient Medications  
Medication Sig  
blood sugar diagnostic (FREESTYLE LITE STRIPS) test strip TEST BLOOD SUGARS 2 
TIMES DAILY  
lancets (FREESTYLE LANCETS) 28 gauge 1 Each twice daily.  
metFORMIN ER (GLUCOPHAGE XR) 750 mg 24 hr tablet Take 1 tablet by mouth daily 
with breakfast.  
ARIPiprazole (ABILIFY) 15 mg tablet Take 1 tablet by mouth once daily.  
sertraline (ZOLOFT) 100 mg tablet Take 1.5 tablets by mouth once daily.  
traZODone (DESYREL) 50 mg tablet Take 1 tablet by mouth daily at bedtime.  
venlafaxine ER (EFFEXOR XR) 75 mg 24 hr capsule Take 1 capsule by mouth once 
daily.  
Cranberry 400 mg cap Take 1 capsule by mouth once daily.  
  
No current facility-administered medications for this visit.  
  
ALLERGIES  
Allergen Reactions  
Hydrocodone-Acetami* Mental Status Change  
  
FILMS SENT TO WORKSTATION:  
  
GUIDELINES FOR HOLDING ANTI-PLATELET AND ANTI- COAGULATION THERAPY: none on file  
  
NURSE SIGNATURE: Madelin Veronica LPN  
DATE: May 16, 2022  
TIME: 11:48 AM  
  
  
  
  
  
Electronically signed by Madelin Veronica LPN at 2022 11:48 AM EDT  
  
  
* Telephone Encounter - Ángelamayra Dontrell - 2022 10:46 AM EDT  
  
Formatting of this note is different from the original.  
RADIOLOGY CALL CENTER INTAKE  
  
: Yodit Olsen EXT: [id number]  
  
DATE: 22 TIME: 10:46am TRACKING #. [id number]  
  
REQUESTING PERSON: Sima Quinonez MD PHONE/PAGER: [phone]  
  
REQUESTING STAFF: Barbara PHONE/PAGER: [phone]  
  
SPECIFICS OF THE REQUEST: (Please be as detailed as possible. If request is 
lymph node biopsy, specify LOCATION of the node if possible): Imaging guided 
biopsy soft tissue mass/muscle  
(For example: biopsy liver mass or biopsy pelvic lymph node )  
  
SPECIAL REQUESTS: TISSUE SAMPLE, LABWORK: N/A  
-Fine needle aspiration (FNA), core biopsy, no preference, unsure, specific 
processing request for pathology  
(For example: send for ER, NE, HER2/jodi or possible lymphoma send in RPMI 
solution )  
  
IS THIS REQUEST PART OF A RESEARCH PROTOCOL: No  
IF YES: List specifics of request and name/contact number of research 
coordinator and primary physician.  
  
MEDICAL DIAGNOSIS: Bone lesion M89.9  
(For example: history of breast cancer with liver mass or history of lymphoma )  
  
TYPE AND DATE OF THE EXAM THAT IS THE BASIS OF THE REQUEST: MRI Date: 22  
(Note: Requests for  random  organ biopsies, specifically liver and kidney 
random biopsies do not need imaging.  
ALL OTHER CASES NEED IMAGING TO EVALUATE APPROPRIATENESS/FEASIBILITY OF THE 
REQUEST)  
  
IMAGING: [facility]  
(If the imaging was obtained outside the [facility] system, then it needs to be 
submitted for review prior to approval.)  
  
Note to all persons requesting biopsies: All biopsy requests will be scheduled 
as quickly as possible, based on the clinical urgency, availability of 
appointment times, the need to hold anti-thrombolytic therapy (aspirin, blood 
thinners) and the patient s schedule, including the need for an available 
. If a percutaneous biopsy or drainage is not felt to be safe or an 
alternative method for establishing a diagnosis is possible, this will be 
discussed directly with the requesting physician.  
  
  
  
Electronically signed by Yodit Jon at 2022 10:48 AM EDT  
  
  
documented in this encounterSelect [facility]- Miscellaneous Notes* 
  Telephone Encounter - Renee Naegele, RN - 2022 10:48 AM EDT  
  
Formatting of this note might be different from the original.  
  
Neuro SPINE CARE COORDINATION QUICK NOTE  
  
  
Spoke with scheduling, triage completed.  
Patient will be contacted by scheduling regarding appointment.  
Patient updated.  
  
Renee Naegele, RN  
  
  
  
  
  
Electronically signed by Renee Naegele, RN at 2022 10:49 AM EDT  
  
  
* Telephone Encounter - Arianne Cooper - 2022 10:17 AM EDT  
  
Formatting of this note might be different from the original.  
Pt attempted to schedule the biopsy but was told the office has to call to 
schedule because it needs to be triaged.  
  
Call appointment for [phone]  
  
Pt- [phone]  
  
  
  
Electronically signed by Arianne Cooper at 2022 10:33 AM EDT  
  
  
documented in this encounterSelect [facility]- Miscellaneous Notes* 
  Telephone Encounter - Renee Naegele, RN - 2022 9:36 AM EDT  
  
Formatting of this note is different from the original.  
Images from the original note were not included.  
  
Neuro SPINE CARE COORDINATION QUICK NOTE  
  
  
Kush K Goyal, MD Renee Naegele, RN  
Please let patient know that I reviewed imaging with Dr Morales in Ortho  
He is happy to see patient in person  
He also recommends a biopsy of Left iliac mass biopsy on 2022, 2022, 
2022  
  
Those are the date he is in clinic at [place] on . We can also arrange 
to see her in spinemedicine clinic with Thompson BOYD on same day  
  
Patient can call [phone] to schedule biopsy at [place]  
  
Advised per Dr. Quinonez's message.  
Patient verbalized understanding with intent to comply.  
Encouraged to call with any further questions/concerns.  
Information also included in a my chart message per patient's request.  
  
Renee Naegele, RN  
  
  
  
  
Electronically signed by Renee Naegele, RN at 2022 9:45 AM EDT  
  
  
documented in this encounterSelect Medical [facility]- Miscellaneous Notes* 
  Allied Health - RT Devi(R) - 2022 8:50 AM EDT  
  
Formatting of this note might be different from the original.  
Radiology Service Progress Note  
  
PATIENT NAME: Shameka Figueredo  
MRN: [id number]  
  
DATE OF SERVICE: May 9, 2022  
TIME: 8:44 AM  
PATIENT IDENTITY VERIFICATION COMPLETED USING TWO (2) IDENTIFIERS: Name and Date
 of Birth confirmedby patient verbally.  
FALL SCREENING: Has the patient had 2 falls in the last year or 1 fall with 
injury or currently using an Ambulatory Assistive Device (Walker, Cane, 
Wheelchair, Crutches, etc.)? No  
PATIENT GENDER DATA: Female. Pregnancy status: Pregnant: No Breastfeeding 
status: N/A  
PATIENT RELEVANT IMPLANT DATA REVIEWED: Not Applicable  
  
RADIOLOGY DEPARTMENT: MR; Exam(s) Completed: Lower MSK: Pelvis, bilateral  
  
PERIPHERAL IV DATA: Not applicable  
  
SIGNED BY: SADI/ RT Devi(R)  
May 9, 2022 8:44 AM  
  
  
  
  
  
Electronically signed by RT Devi(R) at 2022 8:44 AM EDT  
  
  
documented in this encounterSelect Medical [facility]ty [facility]04- NoteHNO ID: [id number]  
Author: Patrick Duarte  
Service: ?  
Author Type: ?  
Type: Progress Notes  
Filed: 2022 11:10 AM  
Note Text:  
Recommendation  
  
Check for radicular symptoms/neurological deficit.  
  
Order consult to [facility] Spine[id number] 
illness Narrative* Patrick Duarte - 2022 11:06 AM EDT  
  
Formatting of this note might be different from the original.  
Recommendation  
  
Check for radicular symptoms/neurological deficit.  
  
Order consult to Medical Spine.  
  
  
  
  
Electronically signed by Patrick Duarte at 2022 11:10 AM EDT  
  
  
documented in this encounterSelect [facility]- NoteHNO ID: [id number]  
Author: Stew Souleymane, PT  
Service: ?  
Author Type: Physical Therapist  
Type: Progress Notes  
Filed: 3/3/2022 5:10 PM  
Note Text:  
Episode Visit Count: 1  
Therapist That Will Oversee The Plan Of Care: Stew Chaudhari  
Start of Care Date: 22  
Onset Date: 02/10/22  
Patient Identified by Name and Date of Birth: Yes  
REHABILITATION AND SPORTS THERAPY  
PHYSICAL THERAPY EVALUATION  
PLAN OF CARE:  
Assessment: Shameka Figueredo presents with chief complaint of low back pain that  
interferes with standing;walking;bending;physical  
activities;sitting;running . She presents with impairments in ADL's,  
flexibility, independence in exercise, overall function, range of motion,  
strength and fear of movement. Prognosis for therapy is Good due to:  
current objective clinical presentation . Further pelvic floor  
assessment may be indicated due to additional urinary symptoms of  
incomplete bladder emptying and pain. She will benefit from skilled  
therapy services to meet the goals established for this plan of care as  
noted below.  
Goals for Episode of Care: created on 22 through 22  
[place] in home exercise program.  
Patient will increase active ROM of lumbar to within normal limits without  
pain to allow pt to to improve performance of ADLs.  
Patient will demonstrate increase in abdominal strength to at least 3/5  
during manual muscle testing in order to improve function for home  
management tasks, leisure / recreation skills and moderate to heavy  
functional tasks.  
Patient will demonstrate increase in LE strength to 5/5 during manual  
muscle testing in order to improve function for home management tasks,  
leisure / recreation skills and moderate to heavy functional tasks.  
Patient will increase flexibility of hips to WNL to improve mechanics and  
decrease pain.  
Perform 1 hour of walking without an increase in pain.  
Improve postural awareness.  
Patient Goals: decrease pain ; improve tolerance to movement  
Planned Interventions, Frequency, and Duration: Current Frequency:  
1x/week  
Duration: 8 weeks  
Total Number of Visits Planned: 8  
Planned Treatment Interventions: Therapeutic exercise  
(97667);Neuromuscular re-education (53841);Manual therapy  
(15491);Therapeutic activities (60409);Self-care home management  
(04991);Gait Training (54980);Patient/Family/Caregiver Education;Body  
Mechanics Training;Functional training;General Conditioning  
PLAN FOR NEXT VISIT: Transfer to [place] ; consider general  
conditioning/strengthening/PNE/posture  
Patient demonstrates good understanding of plan of care and treatment.  
The above goals and plan of care were discussed and agreed upon by  
patient/family.  
Transfer of Care Due To: Closer to Home  
Patient transferring care to: [place]  
SUBJECTIVE: Shameka Figueredo is a 21 year old female seen today for R SI pain  
and chronic LBP. Recently hospitalized for spinal infection. Back pain  
started in pregnancy (2 years ago) and then resolved with birth and  
started again several months later which led to discovery of spinal  
infection. No LE symptoms at this time. Does feel like she has UTI  
symptoms - plans to get this evaluated. She admits to fear avoidance of  
exercise/movement.  
Patient Goals: decrease pain ; improve tolerance to movement  
Functional Limitations: standing;walking;bending;physical  
activities;sitting;running  
Prior Level of Function: Independent without limitations  
Relevant History  
Past Relevant Medical Conditions: Depression  
Employment: Homemaker ()  
Intake Information: Prescription present  
Previous Treatment: Heat?;Injections?  
Falls Interview: No positive findings with falls interview  
Red Flags  
Vertebral Fracture Red Flags: Female  
Vertebral Fracture Clinical Reasoning: Proceed with caution due to the  
above (1-2) risk factors  
Abdominal Aortic Aneurysm Clinical Reasoning: No identified risk factors.  
Cancer Clinical Reasoning: No identified risk factors.  
Infection Clinical Reasoning: No identified risk factors.  
Cauda Equina Syndrome Clinical Reasoning: No identified risk factors.  
Red Flags - Cervical  
Cancer Clinical Reasoning: No identified risk factors.  
Infection Clinical Reasoning: No identified risk factors.  
Spine History  
Symptoms Location at Onset: Back  
Symptoms Since Onset: Unchanging  
Pain is Worse Always: As the day progresses;On the Move  
Pain is Better Sometimes: Rest  
Sleep Affected by Pain: Pain awakens  
Pain:  
Pain  
Pain Level: 0 (at worst 10/10; average 4/10)  
Pain Location: Low Back/Lumbar Spine - Right;Low Back/Lumbar Spine - Left  
Description: Aching  
Frequency: Intermittent  
Post Treatment Pain  
Post Treatment Pain Level: No Change  
OBJECTIVE MEASURES WITH LEVEL OF FUNCTION:  
Posture / Alignment  
Posture: Forward head;Rounded shoulders;Poor  
Sensation - Lumbar  
Sensation: Grossly Intact  
Lumbar Spine AROM  
Lumbar Flexion: Moderate limitation;Increased pain  
Lumbar Extension: Minimal limitat (more content not included)...[facility] NoteHNO ID: [id number]  
Author: Katherine Cordoba RT(R)  
Service: ?  
Author Type: Technologist  
Type: Progress Notes  
Filed: 2/10/2022 4:06 PM  
Note Text:  
Radiology Service Progress Note  
PATIENT NAME: Shameka Figueredo  
MRN: [id number]  
DATE OF SERVICE: February 10, 2022  
TIME: 4:05 PM  
PATIENT IDENTITY VERIFICATION COMPLETED USING TWO (2) IDENTIFIERS: Name  
and Date of Birth confirmed by patient verbally and Name and Date of Birth  
confirmed by identification band.  
FALL SCREENING: Has the patient had 2 falls in the last year or 1 fall  
with injury or currently using an Ambulatory Assistive Device (Walker,  
Cane, Wheelchair, Crutches, etc.)? No  
PATIENT GENDER DATA: Female. Pregnancy status: Pregnant: No  
Breastfeeding status: NO.  
PATIENT RELEVANT IMPLANT DATA REVIEWED: Not Applicable  
RADIOLOGY DEPARTMENT: General X-ray: Exam(s) Completed: Spine X-Ray(s):  
Lumbar AP / LAT / L5-S1  
Pelvis X-Ray: Pelvis General AP and Pelvis inlet/outlet (2V AP/LAT LUMBAR  
ONLY)  
PERIPHERAL IV DATA: Not applicable  
SIGNED BY: RT Jaqueline(R)  
February 10, 2022 4:05 [facility]02- NoteHNO ID: [id number]  
Author: DENVER Franco.CNP  
Service: ?  
Author Type: Nurse Practitioner  
Type: Progress Notes  
Filed: 2022 2:47 PM  
Note Text:  
Spine Care Path Low Back Pain - Chronic (> 12 weeks) Initial Exam  
SUBJECTIVE  
HISTORY OF PRESENT ILLNESS:  
Shameka Figueredo is a 21 year old female who presents with a chief complaint of  
low back pain and is seen in consultation requested by self  
Patient presents with chronic back pain starting May 2021. No  
accident/injury.  
Had imaging done 2021 with abnormal findings at L3. Biopsy done in  
October at local hospital.  
Followed by infectious disease. Was on IV abx starting Oct- end November.  
After antibiotics symptoms improved. Felt 85-90% improved.  
Pain returned in the last few weeks.  
Went to her local ER on 22 d/t pain. Was transfered to [facility] per  
her request. Neurosurgeon following her advised her there was nothing else  
to do, no infection noted and dx with chronic low back pain.  
Went to [facility] ER yesterday, 22. ESR, CRP WNL. MRIs w contrast were done  
of the spine. Seen by Neurosurgery who did not recommend any intervention.  
Pain localized to right low back/buttock and mid lumbar spine  
Pain described as Aching  
Radiation: right buttock  
Numbness/Tingling: feet tingling - intermittent  
Pain worse with constant, movement  
Pain improved with heat  
Interventions: heat, meds  
Medications: gabapentin 300mg TID (no help), cymbalta, advil prn  
Previously: percocet  
Physical Therapy: May-  at [facility]  
History of Spine Injections/Surgery: None  
Other Issues Addressed at the Visit Today: None.  
Precipitating Event: None  
PAIN EVALUATION  
2/10/2022  
1323  
Pain Level: 7  
Pain Location: Back-Lower  
right hip  
Description: Aching  
Duration Units: Unknown  
Frequency: Continuous  
Intervention/Comfort measure: Medication  
Litigation: No  
Workers' Compensation: No  
YELLOW AND BLUE FLAGS  
No-Neg Attitude; Back Pain is Disabling  
No-Avoiding Activity (for Fear of Pain)  
YES-Depression or Anxiety Disorders  
No-Social Problems  
No-Substance Use Disorder  
No-Job Dissatisfaction  
No-Financial Disincentives  
Patient Entered Questionnaires  
PROMIS Score Percentiles  
Percentiles provide an indication of how the patient's score ranks in  
relation to the general population. Higher percentile rankings indicate  
better function/quality of life. 50th percentile is the average of the  
general population and indicates half of respondents had a worse score.  
Depression Screening:  
PHQ-9 Self-Harm (Item 9) response options:  
0 Not at all  
1 Several days  
2 More than half the days  
3 Nearly every day  
PHQ-9 Levels:  
0-4 No - mild depression  
5-9 Mild depression  
10-14 Moderate depression  
15-19 Moderately severe depression  
20-27 Severe depression  
ACTIVE PROBLEM LIST  
Normal Delivery  
Night Sweats  
Diarrhea  
Headache  
Reactive Hypoglycemia  
History of Gestational Diabetes Mellitus (Gdm)  
PAST MEDICAL HISTORY  
Diagnosis Date  
- Depression  
- Gestational diabetes  
- H/O pre-term labor  
- Post partum depression  
PAST SURGICAL HISTORY  
Procedure Laterality Date  
- KNEE ARTHROSCOPY  
- REMOVAL GALLBLADDER  
Social History  
Tobacco Use  
- Smoking status: Never Smoker  
- Smokeless tobacco: Never Used  
Vaping Use  
- Vaping Use: Never used  
Substance Use Topics  
- Alcohol use: Not Currently  
- Drug use: Never  
FAMILY HISTORY  
Problem Relation Age of Onset  
- Hypertension Maternal Grandmother  
- Hyperlipidemia Maternal Grandfather  
- Hypertension Maternal Grandfather  
- Heart Maternal Grandfather  
- other (Lung Cancer) Maternal Grandfather  
ALLERGIES  
Allergen Reactions  
- Hydrocodone-Acetami* Mental Status Change  
CURRENT MEDICATIONS:  
blood sugar diagnostic (FREESTYLE LITE STRIPS) test strip TEST BLOOD  
SUGARS 2 TIMES DAILY  
lancets (FREESTYLE LANCETS) 28 gauge 1 Each twice daily.  
metFORMIN ER (GLUCOPHAGE XR) 750 mg 24 hr tablet Take 1 tablet by mouth  
daily with breakfast.  
ARIPiprazole (ABILIFY) 15 mg tablet Take 1 tablet by mouth once daily.  
sertraline (ZOLOFT) 100 mg tablet Take 1.5 tablets by mouth once daily.  
traZODone (DESYREL) 50 mg tablet Take 1 tablet by mouth daily at bedtime.  
venlafaxine ER (EFFEXOR XR) 75 mg 24 hr capsule Take 1 capsule by mouth  
once daily.  
Cranberry 400 mg cap Take 1 capsule by mouth once daily.  
REVIEW OF SYSTEMS:  
PAIN ASSESSMENT: See HPI.  
GENERAL: Denies fever, chills malaise and weight loss.  
HEENT: No recent change in vision or hearing.  
CARDIOVASCULAR: Denies chest pain, history of A-fib, valvular disease, or  
pacemaker/ICD.  
RESPIRATORY: Denies SOB, sputum production, and hemoptysis.  
GI: Denies GI ulcers, inflammatory disease, or liver disease.  
: Denies change in frequency or urgency, kidney disease, and burning  
with urination.  
MUSCULOSKELETAL: Positive for See HPI  
SKIN: Denies rash or itching.  
PSYCHOLOGICAL: Denies uncontrolled depression or anxiety.  
NEURO: Denies CVA, seizures (more content not included)...[facility]
2019 History general Narrative - Reported*   
  
                                Type            Description     Date  
   
                                Medical History post-partum       
   
                                Surgical History knee surgery      
   
                                Surgical History [facility] [place]--gall bladder removed   
019  
   
                                Hospitalization History labor and delivery   
19  
   
                                Hospitalization History [facility]--[facility] 2019  
  
  
[employer]  
Other Phone: [phone]- History of Past illness Narrative*   
  
                                Problem         Noted Date      Resolved Date  
   
                                                    Diet controlled gestational   
diabetes mellitus (GDM) in third   
trimester                 2019                12/10/2019  
  
documented as of this encounter (statuses as of 2022)  
[facility]- History of Past illness Narrative*   
  
                                Problem         Noted Date      Resolved Date  
   
                                                    Diet controlled gestational   
diabetes mellitus (GDM) in third   
trimester                 2019                12/10/2019  
  
documented as of this encounter (statuses as of 05/10/2022)  
[facility]- History of Past illness Narrative*   
  
                                Problem         Noted Date      Resolved Date  
   
                                                    Diet controlled gestational   
diabetes mellitus (GDM) in third   
trimester                 2019                12/10/2019  
  
documented as of this encounter (statuses as of 2022)  
[address]2019 History of Past illness Narrative*   
  
                                Problem         Noted Date      Resolved Date  
   
                                                    Diet controlled gestational   
diabetes mellitus (GDM) in third   
trimester                 2019                12/10/2019  
  
documented as of this encounter (statuses as of 2022)  
[facility]- History of Past illness Narrative*   
  
                                Problem         Noted Date      Resolved Date  
   
                                                    Diet controlled gestational   
diabetes mellitus (GDM) in third   
trimester                 2019                12/10/2019  
  
documented as of this encounter (statuses as of 2022)  
[address]2019 History of Past illness Narrative*   
  
                                Problem         Noted Date      Resolved Date  
   
                                                    Diet controlled gestational   
diabetes mellitus (GDM) in third   
trimester                 2019                12/10/2019  
  
documented as of this encounter (statuses as of 2022)  
[address]2019 History of Past illness Narrative*   
  
                                Problem         Noted Date      Resolved Date  
   
                                                    Diet controlled gestational   
diabetes mellitus (GDM) in third   
trimester                 2019                12/10/2019  
  
documented as of this encounter (statuses as of 10/20/2022)  
[address]2019 History of Past illness Narrative*   
  
                                Problem         Noted Date      Resolved Date  
   
                                                    Diet controlled gestational   
diabetes mellitus (GDM) in third   
trimester                 2019                12/10/2019  
  
documented as of this encounter (statuses as of 2022)  
[address]2019 History of Past illness Narrative*   
  
                                Problem         Noted Date      Resolved Date  
   
                                                    Diet controlled gestational   
diabetes mellitus (GDM) in third   
trimester                 2019                12/10/2019  
  
documented as of this encounter (statuses as of 2022)  
[address]2019 History of Past illness Narrative*   
  
                                Problem         Noted Date      Resolved Date  
   
                                                    Diet controlled gestational   
diabetes mellitus (GDM) in third   
trimester                 2019                12/10/2019  
  
documented as of this encounter (statuses as of 2022)  
[address]2019 History of Past illness Narrative*   
  
                                Problem         Noted Date      Resolved Date  
   
                                                    Diet controlled gestational   
diabetes mellitus (GDM) in third   
trimester                 2019                12/10/2019  
  
documented as of this encounter (statuses as of 12/15/2022)  
[address]2019 History of Past illness Narrative*   
  
                                Problem         Noted Date      Resolved Date  
   
                                                    Diet controlled gestational   
diabetes mellitus (GDM) in third   
trimester                 2019                12/10/2019  
  
documented as of this encounter (statuses as of 2023)  
[facility] note*   
  
                                                    Diagnosis  
   
                                                      
  
  
Sacroiliac joint pain- Primary  
  
  
Disorders of sacrum  
   
                                                      
  
  
Chronic right-sided low back pain without sciatica  
   
                                                      
  
  
Sacroiliac joint pain  
  
  
Disorders of sacrum  
   
                                                      
  
  
Chronic right-sided low back pain without sciatica  
  
documented in this encounter  
[facility] note*   
  
                                                    Diagnosis  
   
                                                      
  
  
Disorder of bone  
  
  
Disorder of bone and cartilage, unspecified  
   
                                                      
  
  
Bone lesion  
  
  
Disorder of bone and cartilage, unspecified  
  
documented in this encounter  
[facility] noteNo InformationNoMiss[facility]  
Other Phone: [phone] note*   
  
                                                    Diagnosis  
   
                                                      
  
  
Sacroiliac joint pain- Primary  
  
  
Disorders of sacrum  
   
                                                      
  
  
Chronic bilateral low back pain without sciatica  
   
                                                      
  
  
Bone lesion  
  
  
Disorder of bone and cartilage, unspecified  
   
                                                      
  
  
Disorder of bone  
  
  
Disorder of bone and cartilage, unspecified  
  
documented in this encounter  
[facility] noteNo assessment information [facility]  
Work Phone: [phone] note* Psychological: Appropriate mood and 
  behaviorNeurological: alert and oriented x3, intact senses, motor, response 
  and reflexes, normal strengthCN 2 The fundi were well visualized with normal 
  disc margins, clear vessels and vascular pulsations. No disc edema. No 
  hemorrhages or exudates were present inthe posterior segments that were 
  visualized. Visual fields full to confrontation. CN 3, 4, 6 Pupilsround, 
  equally reactive to light. No ptosis. EOM normal alignment, full range with 
  normal saccades,pursuit and convergence. No nystagmus. CN 5 Facial sensation 
  intact bilaterally. CN 7 Normal and symmetric facial strength. Nasolabial 
  folds symmetric. CN 8 Hearing intact to finger rub bilaterally. CN 9 Palate 
  elevates symmetrically. CN 11 Bilaterally normal strength of shoulder shrug 
  and neck turning. CN 12 Tongue midline, with normal bulk and strength; no 
  fasciculations. Patient is handling pharyngeal secretions well.Extremities: 
  normal extremities, no cyanosis edema, contusions or wounds, no 
  clubbingMusculoskeletal: ROM intact, no joint swelling, normal 
  strengthGastrointestinal: Nondistended, soft, non-tender, no rebound 
  tenderness or guarding, no masses palpable, no organomegaly, +BS, no 
  bruitsCardiovascular: Regular, rate and rhythm, no murmurs, 2+ equal pulses of
   the extremities, normal S 1and S 2Respiratory/Thorax: Patent airways, CTAB, 
  normal breath sounds with good chest expansion, thorax symmetricHead/Neck: No 
  JVD, trachea midline, no bruitsENMT: mucous membranes moist, no apparent 
  injury, no lesions seenEyes: PERRL, clear scleraSkin: Warm and dry, no 
  lesions, no rashesConstitutional: Well developed, awake/alert/oriented x3, no 
  distress, alert and cooperative  
[facility]   
  
                                                    Diagnosis  
   
                                                      
  
  
History of  delivery, currently pregnant in second trimester- Primary  
   
                                                      
  
  
Encounter for fetal anatomic survey  
  
documented in this encounter  
[facility] note*   
  
                                                    Diagnosis  
   
                                                      
  
  
Encounter for follow-up ultrasound of fetal anatomy- Primary  
  
documented in this encounter  
[facility] note*   
  
                                                    Diagnosis  
   
                                                      
  
  
History of  delivery, currently pregnant in second trimester [O09.892   
(ICD-10-CM)]- Primary  
   
                                                      
  
  
Encounter for follow-up ultrasound of fetal anatomy  
  
documented in this encounter  
[facility] note*   
  
                          Diagnosis    Onset Date   Resolution   Status  
   
                          37 weeks gestation of pregnancy                         
    acute  
   
                          Status post vaginal delivery                            
 acute  
  
  
[facility]  
Work Phone: [phone] note*   
  
                          Diagnosis    Onset Date   Resolution   Status  
   
                          Sinusitis, acute maxillary                           a  
cute  
   
                          Left lateral abdominal pain                             
acute  
  
  
[facility]  
Work Phone: [phone] Discharge instructions* Follow Up Appointment
  1:Physician/Dept/Service:  for Referral: Mental health follow upSc
  heduled Date/Time: 20-Sep-2022 01:00Location: [address], [place] Number: [phone]: [address]  
* Follow Up Appointment 2:Physician/Dept/Service: family health servicesKimberly 
  for Referral: mental health follow upLocation:  [facility] 
  [address] Number: [phone]  
[facility]  
Additional Instructions  
Keep upcoming appointment with OB [facility]  
Work Phone: [phone] Discharge instructions  
Additional Instructions  
Pregnancy belt, warm baths/showersFire[facility]  
Work Phone: [phone] for referral (narrative)* Diagnostic Procedure 
  Only (Routine) - Pending Review  
  
                          Specialty    Diagnoses / Procedures Referred By Contac  
t Referred To Contact  
   
                                                    [facility]                                 
  
  
Diagnoses  
  
  
Encounter for follow-up   
ultrasound of fetal   
anatomy  
  
  
  
Procedures  
  
  
OBSTETRIC ULTRASOUND   
[facility]I  
  
  
US PREG UTERUS AFTER   
1ST TRIMEST    
GESTATION                                 
  
  
Umm Rico MD  
  
  
[address]  
  
  
[place] [address]  
  
  
Phone: [phone]  
  
  
Fax: [phone]                         
  
  
[facility]  
  
  
[address]  
  
  
[place] [address]  
  
  
  
                          Referral ID  Status       Reason       Start   
Date                                    Expiration   
Date                                    Visits   
Requested                               Visits   
Authorized  
   
                                        [id number]            Pending   
Review                                    
  
  
Auto-Generat  
ed Referral                               
2                   2023          1                   1  
  
  
  
  
Electronically signed by Umm Rico MD at 2022 2:40 PM [facility]  
  
Summary Purpose  
  
  
                                                      
  
  
  
Family History  
  
  
                          Relationship Condition    Age at Onset Recorded Date/T  
christiano  
   
                          Not Specified No pertinent family history Unknown       
   
  
  
  
Advance Directives  
                                Documents on File  
  
                          Type         Date Recorded Patient Representative Expl  
anation  
   
                          Advance Directives and Living Will                      
         
   
                          Power of                              
  
                           Latest Code Status on File  
  
                          Code Status  Date Activated Date Inactivated Comments  
   
                          Full Code    7/3/2019 2:52 PM 2019 4:14 PM   
  
  
  
                                                                   
   
                          Full Code    7/3/2019 5:05 AM 7/3/2019 2:52 PM   
  
  
  
                                                                   
   
                          Full Code    2019 4:09 PM 2019 9:44 PM   
  
  
  
                                                                   
   
                          Full Code    2019 10:51 PM 2019 3:31 AM   
  
                                Documents on File  
  
                          Type         Date Recorded Patient Representative Expl  
anation  
   
                          ACP-Advance Directive                             
   
                          ACP-Power of                              
  
                                Documents on File  
  
                          Type         Date Recorded Patient Representative Expl  
anation  
   
                          Advance Directive(s) 2022 2:37 PM                
   
                          Advance Directive(s) 12/3/2019 1:23 PM                
  
                                Documents on File  
  
                          Type         Date Recorded Patient Representative Expl  
anation  
   
                          Advance Directive(s) 2022 8:18 AM                
   
                          Advance Directive(s) 2022 2:37 PM                
   
                          Advance Directive(s) 12/3/2019 1:23 PM                
  
                                Documents on File  
  
                          Type         Date Recorded Patient Representative Expl  
anation  
   
                          Advance Directive(s) 2022 8:18 AM                
   
                          Advance Directive(s) 2022 2:37 PM                
   
                          Advance Directive(s) 12/3/2019 1:23 PM                
  
                                Documents on File  
  
                          Type         Date Recorded Patient Representative Expl  
anation  
   
                          Advance Directive(s) 2022 8:40 AM                
   
                          Advance Directive(s) 2022 8:18 AM                
   
                          Advance Directive(s) 2022 2:37 PM                
   
                          Advance Directive(s) 12/3/2019 1:23 PM                
  
  
  
                                Advance Directive Response        Recorded Date/  
Time  
   
                                Advance Directives No               4:18pm  
  
  
  
                                Advance Directive Response        Recorded Date/  
Time  
   
                                Advance Directives No               3:18pm  
  
  
  
Assessments  
  
  
                                                    Diagnosis  
   
                                                      
  
  
Encounter for annual routine gynecological examination  
  
  
  
                                                    Diagnosis  
   
                                                      
  
  
Encounter for pregnancy test, result unknown  
  
  
  
                                                    Diagnosis  
   
                                                      
  
  
Menorrhagia with regular cycle  
  
  
Excessive or frequent menstruation  
   
                                                      
  
  
Screening for cervical cancer  
  
  
Screening for malignant neoplasm of the cervix  
  
  
  
                                                    Diagnosis  
   
                                                      
  
  
Fever, unspecified fever cause  
  
  
  
                                                    Diagnosis  
   
                                                      
  
  
Nausea, vomiting and diarrhea  
  
  
Nausea with vomiting  
  
  
  
                                                    Diagnosis  
   
                                                      
  
  
Boil of vulva  
  
  
Other abscess of vulva  
   
                                                      
  
  
Vaginal discharge  
  
  
Leukorrhea, not specified as infective  
  
  
  
                                                    Diagnosis  
   
                                                      
  
  
Vaginal odor  
  
  
Unspecified symptom associated with female genital organs  
  
  
  
                                                    Diagnosis  
   
                                                      
  
  
Abscess, [place]'s gland  
  
  
Urethral abscess  
   
                                                      
  
  
Vaginal discharge  
  
  
Leukorrhea, not specified as infective  
  
  
  
                                                    Diagnosis  
   
                                                      
  
  
Vaginal discharge  
  
  
Leukorrhea, not specified as infective  
  
  
  
                                                    Diagnosis  
   
                                                      
  
  
Vaginal discharge  
  
  
Leukorrhea, not specified as infective  
   
                                                      
  
  
Dysuria  
  
  
  
Discharge Instructions  
* Attachments  
  
The following attachments cannot be sent through [facility].  
  
* Diarrhea (English)  
* Nausea and Vomiting (English)  
  
documented in this encounter  
  
Reason for Referral  
  
  
                          Specialty    Diagnoses / Procedures Referred By Contac  
t Referred To Contact  
   
                                        MR IMAGING            
  
  
Diagnoses  
  
  
Disorder of bone  
  
  
Bone lesion  
  
  
  
Procedures  
  
  
MRI PELVIS ORTHO GENERAL WO   
IVCON  
  
  
MRI PELVIS W/O CONTRAST   
MATERIAL                                  
  
  
Estelle Mackay,   
[facility]  
  
  
[address] [address]  
  
  
Phone: [phone]  
  
  
Fax: [phone]                         
  
  
Mr Imaging  
  
  
  
                    Referral ID Status    Reason    Start Date Expiration Date V  
isits   
Requested                               Visits   
Authorized  
   
                                [id number]        Closed            
  
  
Auto-Generate  
d Referral      2022       1               1  
  
  
  
                          Specialty    Diagnoses / Procedures Referred By Contac  
t Referred To Contact  
   
                                        MR IMAGING            
  
  
Diagnoses  
  
  
Bone lesion  
  
  
Disorder of bone  
  
  
  
Procedures  
  
  
MRI PELVIS ORTHO GENERAL WO   
IVCON  
  
  
MRI PELVIS W/O CONTRAST   
MATERIAL                                  
  
  
Estelle Mackay,   
[facility]  
  
  
[address] [place] [address]  
  
  
Phone: [phone]  
  
  
Fax: [phone]                         
  
  
Mr Imaging  
  
  
  
                          Referral ID  Status       Reason       Start   
Date                                    Expiration   
Date                                    Visits   
Requested                               Visits   
Authorized  
   
                                        [id number]            Pending   
Review                                    
  
  
Auto-Generat  
ed Referral     2022       1               1  
  
  
  
                          Specialty    Diagnoses / Procedures Referred By Contac  
t Referred To Contact  
   
                                                    [facility]                               
  
  
Diagnoses  
  
  
Sacroiliac joint pain  
  
  
Chronic bilateral low   
back pain without   
sciatica  
  
  
  
Procedures  
  
  
CONSULT TO PHYSICAL   
THERAPY  
  
  
PHYSICAL THERAPY   
EVALUATION HIGH COMPLEX   
45 MINS                                   
  
  
Estelle Mackay,   
[facility]  
  
  
[address] [place] [address]  
  
  
Phone: [phone]  
  
  
Fax: [phone]                         
  
  
[facility]  
  
  
[address] [place] [address]  
  
  
  
                          Referral ID  Status       Reason       Start   
Date                                    Expiration   
Date                                    Visits   
Requested                               Visits   
Authorized  
   
                                        [id number]            Pending   
Review                                    
  
  
Auto-Generat  
ed Referral     2022       1               1  
  
  
  
Chief Complaint and Reason for Visit  
  
  
                                        Chief Complaint     O20.0 O20.9  
FR  
  
  
  
                                        Chief Complaint     O20.0 O20.9  
[facility]  
Z34.90  
Z34.90  
dizzy abd pain  
Positive urinary pregnancy test  
  
  
  
                                        Chief Complaint     O20.0 O20.9  
FR  
Z34.90  
Z34.90  
dizzy abd pain  
Positive urinary pregnancy test  
Z32.01  
  
  
  
                                        Chief Complaint     O20.0 O20.9  
[facility]  
Z34.90  
Z34.90  
dizzy abd pain  
Positive urinary pregnancy test  
Z32.01  
Z32.01  
  
  
  
                                        Chief Complaint     O20.0 O20.9  
FR  
Z34.90  
Z34.90  
dizzy abd pain  
Positive urinary pregnancy test  
Z32.01  
Z32.01  
vomiting, cramping, pregnant not sure of weeks  
  
  
  
                                        Chief Complaint     Z3A.25 Z13.1  
  
  
  
                                        Chief Complaint     Z3A.25 Z13.1  
cramping  
  
  
  
                                        Chief Complaint     Z3A.25 Z13.1  
cramping  
35 weeks gestation of pregnancy; screenin  
  
  
  
                                        Chief Complaint     Z3A.25 Z13.1  
cramping  
35 weeks gestation of pregnancy; screenin  
IUP (Intrauterine Pregnancy)  
37 wks-contractions  
z39.1  
   
                                        Reason for Visit    37 weeks gestation o  
f pregnancy  
Status post vaginal delivery  
  
  
  
                                        Chief Complaint     right ear, throat, s  
tomach pain  
  
  
  
                                        Chief Complaint     right ear, throat, s  
tomach pain  
Blood in stool/backpain  
   
                                        Reason for Visit    Sinusitis, acute max  
illary  
Left lateral abdominal pain  
  
  
  
Additional Source Comments  
  
  
  
                                                    INFORMATION SOURCE (unrecogn  
ized section and content)  
   
                                          
  
  
  
                                        DATE CREATED        AUTHOR  
   
                                2018                      [place] [employer] [facility]  
  
  
  
                                DATE CREATED    AUTHOR          AUTHOR'S ORGANIZ  
ATION  
   
                                2021                      [facility]  
  
  
  
                                DATE CREATED    AUTHOR          AUTHOR'S ORGANIZ  
ATION  
   
                                2022                      [facility]  
  
  
  
                                DATE CREATED    AUTHOR          AUTHOR'S ORGANIZ  
ATION  
   
                                05/10/2022                      [facility]  
l  
  
  
  
                                DATE CREATED    AUTHOR          AUTHOR'S ORGANIZ  
ATION  
   
                                2022                      [facility]  
l  
  
  
  
                                DATE CREATED    AUTHOR          AUTHOR'S ORGANIZ  
ATION  
   
                                2022                       [facility]  
 [facility]  
  
  
  
                                DATE CREATED    AUTHOR          AUTHOR'S ORGANIZ  
ATION  
   
                                2022                      [facility]  
  
  
  
                                DATE CREATED    AUTHOR          AUTHOR'S ORGANIZ  
ATION  
   
                                2023                      [facility]  
p[facility]  
  
  
  
                                DATE CREATED    AUTHOR          AUTHOR'S ORGANIZ  
ATION  
   
                                10/26/2023                      [facility]  
  
  
  
  
  
                                                    Reason for Visit (unrecogniz  
ed section and content)  
   
                                          
  
  
  
                                        Reason              Comments  
   
                                        Diarrhea            Ongoing, worse x 1 w  
[place]  
   
                                        Nausea              Onset PTA during a B  
M, resolved now  
   
                                        Abdominal Pain      Bilateral lower abdo  
men, cramping  
  
  
  
                          Specialty    Diagnoses / Procedures Referred By Contac  
t Referred To Contact  
   
                                        MR IMAGING            
  
  
Diagnoses  
  
  
Disorder of bone  
  
  
Bone lesion  
  
  
  
Procedures  
  
  
MRI PELVIS ORTHO GENERAL WO   
IVCON  
  
  
MRI PELVIS W/O CONTRAST   
MATERIAL                                  
  
  
Estelle Mackay,   
[facility].CNP  
  
  
[address] [address]  
  
  
Phone: [phone]  
  
  
Fax: [phone]                         
  
  
Mr Imaging  
  
  
  
                    Referral ID Status    Reason    Start Date Expiration Date V  
isits   
Requested                               Visits   
Authorized  
   
                                [id number]        Closed            
  
  
Auto-Generate  
d Referral      2022       1               1  
  
  
  
                                        Reason              Comments  
   
                                        Results               
   
                                        Follow Up             
  
  
  
                                        Reason              Comments  
   
                                        biospy                
  
  
  
                                        Reason              Comments  
   
                                        Biopsy Request        
  
  
  
                                        Reason              Comments  
   
                                        Low Back Pain         
  
  
  
                                        Reason              Comments  
   
                                        Appointment           
  
  
  
                                        Reason              Comments  
   
                                        Pregnancy US          
  
  
  
                          Specialty    Diagnoses / Procedures Referred By Contac  
t Referred To Contact  
   
                                                    [facility]                                 
  
  
Diagnoses  
  
  
Encounter for fetal   
anatomic survey  
  
  
  
Procedures  
  
  
OBSTETRIC ULTRASOUND   
[facility]  
  
  
US PREG UTERUS AFTER   
1ST TRIMEST    
GESTATION                                 
  
  
Provider, Ob Gyn   
Transcribe                                
  
  
[facility]  
  
  
[address] [place] [address]  
  
  
  
                    Referral ID Status    Reason    Start Date Expiration Date V  
isits   
Requested                               Visits   
Authorized  
   
                                [id number]        Closed            
  
  
Auto-Generate  
d Referral      2022      1               1  
  
  
  
                                        Reason              Comments  
   
                                        Consult               
  
  
  
                          Specialty    Diagnoses / Procedures Referred By Contac  
t Referred To Contact  
   
                                                    [facility]                                 
  
  
Diagnoses  
  
  
Encounter for follow-up   
ultrasound of fetal   
anatomy  
  
  
  
Procedures  
  
  
OBSTETRIC ULTRASOUND   
[facility]  
  
  
US PREG UTERUS AFTER   
1ST TRIMEST    
GESTATION                                 
  
  
Umm Rico MD  
  
  
[address]  
  
  
[place] [address]  
  
  
Phone: [phone]  
  
  
Fax: [phone]                         
  
  
[facility]  
  
  
[address]  
  
  
[place] [address]  
  
  
  
                          Referral ID  Status       Reason       Start   
Date                                    Expiration   
Date                                    Visits   
Requested                               Visits   
Authorized  
   
                                [id number]        Authorized        
  
  
Auto-Generat  
ed Referral     2023      1               20  
  
  
  
  
  
                                                    Source Comments (unrecognize  
d section and content)  
   
                                                    In the event this informatio  
n is protected by the Federal Confidentiality of   
Alcohol   
and Drug Abuse Patient Records regulations: This information has been disclosed 
to   
you from records protected by Federal confidentiality rules (42 CFR Part 2). The
  
Federal rules prohibit you from making any further disclosure of this 
information   
unless further disclosure is expressly permitted by the written consent of the 
person   
to whom it pertains or as otherwise permitted by 42 CFR Part 2. A general   
authorization for the release of medical or other information is NOT sufficient 
for   
this purpose. The Federal rules restrict any use of the information to 
criminally   
investigate or prosecute any alcohol or drug abuse patient.[facility] 
the   
event this information is protected by the Federal Confidentiality of Alcohol 
and   
Drug Abuse Patient Records regulations: This information has been disclosed to 
you   
from records protected by Federal confidentiality rules (42 CFR Part 2). The 
Federal   
rules prohibit you from making any further disclosure of this information unless
  
further disclosure is expressly permitted by the written consent of the person 
to   
whom it pertains or as otherwise permitted by 42 CFR Part 2. A general 
authorization   
for the release of medical or other information is NOT sufficient for this 
purpose.   
The Federal rules restrict any use of the information to criminally investigate 
or   
prosecute any alcohol or drug abuse patient.[facility] the event this   
information is protected by the Federal Confidentiality of Alcohol and Drug 
Abuse   
Patient Records regulations: This information has been disclosed to you from 
records   
protected by Federal confidentiality rules (42 CFR Part 2). The Federal rules   
prohibit you from making any further disclosure of this information unless 
further   
disclosure is expressly permitted by the written consent of the person to whom 
it   
pertains or as otherwise permitted by 42 CFR Part 2. A general authorization for
the   
release of medical or other information is NOT sufficient for this purpose. The   
Federal rules restrict any use of the information to criminally investigate or   
prosecute any alcohol or drug abuse patient.[facility] the event this   
information is protected by the Federal Confidentiality of Alcohol and Drug 
Abuse   
Patient Records regulations: This information has been disclosed to you from 
records   
protected by Federal confidentiality rules (42 CFR Part 2). The Federal rules   
prohibit you from making any further disclosure of this information unless 
further   
disclosure is expressly permitted by the written consent of the person to whom 
it   
pertains or as otherwise permitted by 42 CFR Part 2. A general authorization for
the   
release of medical or other information is NOT sufficient for this purpose. The   
Federal rules restrict any use of the information to criminally investigate or   
prosecute any alcohol or drug abuse patient.[facility] the event this   
information is protected by the Federal Confidentiality of Alcohol and Drug 
Abuse   
Patient Records regulations: This information has been disclosed to you from 
records   
protected by Federal confidentiality rules (42 CFR Part 2). The Federal rules   
prohibit you from making any further disclosure of this information unless 
further   
disclosure is expressly permitted by the written consent of the person to whom 
it   
pertains or as otherwise permitted by 42 CFR Part 2. A general authorization for
the   
release of medical or other information is NOT sufficient for this purpose. The   
Federal rules restrict any use of the information to criminally investigate or   
prosecute any alcohol or drug abuse patient.[facility] the event this   
information is protected by the Federal Confidentiality of Alcohol and Drug 
Abuse   
Patient Records regulations: This information has been disclosed to you from 
records   
protected by Federal confidentiality rules (42 CFR Part 2). The Federal rules   
prohibit you from making any further disclosure of this information unless 
further   
disclosure is expressly permitted by the written consent of the person to whom 
it   
pertains or as otherwise permitted by 42 CFR Part 2. A general authorization for
the   
release of medical or other information is NOT sufficient for this purpose. The   
Federal rules restrict any use of the information to criminally investigate or   
prosecute any alcohol or drug abuse patient.[facility] the event this   
information is protected by the Federal Confidentiality of Alcohol and Drug 
Abuse   
Patient Records regulations: This information has been disclosed to you from 
records   
protected by Federal confidentiality rules (42 CFR Part 2). The Federal rules   
prohibit you from making any further disclosure of this information unless 
further   
disclosure is expressly permitted by the written consent of the person to whom 
it   
pertains or as otherwise permitted by 42 CFR Part 2. A general authorization for
the   
release of medical or other information is NOT sufficient for this purpose. The   
Federal rules restrict any use of the information to criminally investigate or   
prosecute any alcohol or drug abuse [facility] the event this   
information is protected by the Federal Confidentiality of Alcohol and Drug 
Abuse   
Patient Records regulations: This information has been disclosed to you from 
records   
protected by Federal confidentiality rules (42 CFR Part 2). The Federal rules   
prohibit you from making any further disclosure of this information unless 
further   
disclosure is expressly permitted by the written consent of the person to whom 
it   
pertains or as otherwise permitted by 42 CFR Part 2. A general authorization for
the   
release of medical or other information is NOT sufficient for this purpose. The   
Federal rules restrict any use of the information to criminally investigate or   
prosecute any alcohol or drug abuse patient.[facility] the event this   
information is protected by the Federal Confidentiality of Alcohol and Drug 
Abuse   
Patient Records regulations: This information has been disclosed to you from 
records   
protected by Federal confidentiality rules (42 CFR Part 2). The Federal rules   
prohibit you from making any further disclosure of this information unless 
further   
disclosure is expressly permitted by the written consent of the person to whom 
it   
pertains or as otherwise permitted by 42 CFR Part 2. A general authorization for
the   
release of medical or other information is NOT sufficient for this purpose. The   
Federal rules restrict any use of the information to criminally investigate or   
prosecute any alcohol or drug abuse patient.[facility] the event this   
information is protected by the Federal Confidentiality of Alcohol and Drug 
Abuse   
Patient Records regulations: This information has been disclosed to you from 
records   
protected by Federal confidentiality rules (42 CFR Part 2). The Federal rules   
prohibit you from making any further disclosure of this information unless 
further   
disclosure is expressly permitted by the written consent of the person to whom 
it   
pertains or as otherwise permitted by 42 CFR Part 2. A general authorization for
the   
release of medical or other information is NOT sufficient for this purpose. The   
Federal rules restrict any use of the information to criminally investigate or   
prosecute any alcohol or drug abuse patient.[facility] the event this   
information is protected by the Federal Confidentiality of Alcohol and Drug 
Abuse   
Patient Records regulations: This information has been disclosed to you from 
records   
protected by Federal confidentiality rules (42 CFR Part 2). The Federal rules   
prohibit you from making any further disclosure of this information unless 
further   
disclosure is expressly permitted by the written consent of the person to whom 
it   
pertains or as otherwise permitted by 42 CFR Part 2. A general authorization for
the   
release of medical or other information is NOT sufficient for this purpose. The   
Federal rules restrict any use of the information to criminally investigate or   
prosecute any alcohol or drug abuse patient.[facility]In the event this   
information is protected by the Federal Confidentiality of Alcohol and Drug 
Abuse   
Patient Records regulations: This information has been disclosed to you from 
records   
protected by Federal confidentiality rules (42 CFR Part 2). The Federal rules   
prohibit you from making any further disclosure of this information unless 
further   
disclosure is expressly permitted by the written consent of the person to whom 
it   
pertains or as otherwise permitted by 42 CFR Part 2. A general authorization for
the   
release of medical or other information is NOT sufficient for this purpose. The   
Federal rules restrict any use of the information to criminally investigate or   
prosecute any alcohol or drug abuse patient.[facility]  
  
  
  
  
                                                    Care Teams (unrecognized sec  
tion and content)  
   
                                          
  
  
  
                      Team Member Relationship Specialty  Start Date End Date  
   
                                                      
  
  
Layla Renteria MD  
  
  
[facility] [place] [address]  
  
  
[phone] (Work)  
  
  
[phone] (Fax) PCP - General   Internal Medicine 19           
  
  
  
                      Team Member Relationship Specialty  Start Date End Date  
   
                                                      
  
  
Layla Renteria MD  
  
  
[facility] [place] [address]  
  
  
[phone] (Work)  
  
  
[phone] (Fax) PCP - General   Internal Medicine 19           
  
  
  
                      Team Member Relationship Specialty  Start Date End Date  
   
                                                      
  
  
Layla Renteria MD  
  
  
[facility] [place] [address]  
  
  
[phone] (Work)  
  
  
[phone] (Fax) PCP - General   Internal Medicine 19           
  
  
  
                      Team Member Relationship Specialty  Start Date End Date  
   
                                                      
  
  
Layla Renteria MD  
  
  
[address] [place] [address]  
  
  
[phone] (Work)  
  
  
[phone] (Fax) PCP - General   Internal Medicine 19           
  
  
  
                      Team Member Relationship Specialty  Start Date End Date  
   
                                                      
  
  
Bj Menjivar MD  
  
  
[address] [place] [address] [phone] (Work) PCP - General   Family Practice 22           
  
  
  
                                                    Team Status: Inactive   
   
                          Member       Role         Status       Dates  
   
                          Bj Menjivar MD Primary Care Provider Active        
   
   
                          Eros Sandoval Jr, DO Attending Provider Active         
  
  
  
                                                    Team Status: Inactive   
   
                          Member       Role         Status       Dates  
   
                          Bj Menjivar MD Primary Care Provider Active        
   
   
                          Ro Nataprawira , DO Attending Provider Active         
  
  
  
                                                    Team Status: Active   
   
                          Member       Role         Status       Dates  
   
                          Bj Menjivar MD Primary Care Provider Active        
   
  
  
  
                                                    Team Status: Inactive   
   
                          Member       Role         Status       Dates  
   
                          Bj Menjivar MD Primary Care Provider Active        
   
   
                          Thai Cano , DO Emergency Provider Active         
  
  
  
                                                    Team Status: Inactive   
   
                          Member       Role         Status       Dates  
   
                          Bj Menjivar MD Primary Care Provider Active        
   
   
                          Dwain Guzman , DO Emergency Provider Active         
  
  
  
                      Team Member Relationship Specialty  Start Date End Date  
   
                                                      
  
  
Bj Menjivar MD  
  
  
[address] [place] [facility] [address] [phone] (Work) PCP - General   Family Medicine 22           
  
  
  
                      Team Member Relationship Specialty  Start Date End Date  
   
                                                      
  
  
Bj Menjivar MD  
  
  
[address] [place] [facility] [address] [phone] (Work) PCP - General   Family Medicine 22           
  
  
  
                      Team Member Relationship Specialty  Start Date End Date  
   
                                                      
  
  
Bj Menjivar MD  
  
  
[address] [place] [facility] [address] [phone] (Work) PCP - General   Family Medicine 22           
  
  
  
                      Team Member Relationship Specialty  Start Date End Date  
   
                                                      
  
  
Bj Menjivar MD  
  
  
[address] [place] [facility] [address] [phone] (Work) PCP - General   Family Medicine 22           
  
  
  
                      Team Member Relationship Specialty  Start Date End Date  
   
                                                      
  
  
Bj Menjivar MD  
  
  
[address] [place] [facility] [address] [phone] (Work) PCP - General   Family Medicine 22           
  
  
  
                                                    Team Status: Inactive   
   
                          Member       Role         Status       Dates  
   
                          Bj Menjivar MD Primary Care Provider Active        
   
   
                          Ro Duffy DO Referring Provider Active         
   
                          Juan Francisco Stoll DO Attending Provider Active         
  
  
  
                                                    Team Status: Inactive   
   
                          Member       Role         Status       Dates  
   
                          Ro Duffy ,  Attending Provider Active         
  
  
  
                                                    Team Status: Inactive   
   
                          Member       Role         Status       Dates  
   
                          Bj Menjivar MD Primary Care Provider Active        
   
   
                          Iglesia Ayala MD Attending Provider Active         
  
  
  
                                                    Team Status: Inactive   
   
                          Member       Role         Status       Dates  
   
                          Bj Menjivar MD Primary Care Provider Active        
   
   
                          Evelin Sethi MD Admit Provider, Attending Provider  
 Active         
  
  
  
                                                    Team Status: Inactive   
   
                          Member       Role         Status       Dates  
   
                          Bj Menjivar MD Primary Care Provider Active        
   
   
                          Shanika Gordon MD Attending Provider Active         
  
  
  
                                                    Team Status: Inactive   
   
                          Member       Role         Status       Dates  
   
                                        Bj Menjivar MD Primary Care Provide  
r, Attending   
Provider                  Active                    Start: 2024  
End: 2024  
  
  
  
                                                    Team Status: Inactive   
   
                          Member       Role         Status       Dates  
   
                                        Bj Menjivar MD Primary Care Provide  
r,   
Attending Provider        Active                    Start: 2024  
End: 2024  
  
  
  
  
  
                                                    Goals (unrecognized section   
and content)  
   
                                                    Goals may be documented in a  
n alternate section  
  
  
                                     <item>  
  
                                                    Privacy Markings (unrecogniz  
ed section and content)  
   
                                                    Section Author: Ольга Hernandez   
PROHIBITION ON REDISCLOSURE OF CONFIDENTIAL   
INFORMATION   
This notice accompanies a disclosure of information concerning a client made to 
you   
with the consent of such client. This information has been disclosed to you from
  
records protected by federal confidentiality rules (42 C.F.R. Part 2). The 
federal   
rules prohibit you from making any further disclosure of this information unless
  
further disclosure is expressly permitted by the written consent of the person 
to   
whom it pertains or as otherwise permitted by 42 C.F.R. Part 2. A general   
authorization for the release of medical or other information is NOT sufficient 
for   
this purpose.  
  
FOR RECORDS PERTAINING TO PATIENTS WHO ARE OR HAVE BEEN ENROLLED IN A CHEMICAL 
DEPENDENCY/SUBSTANCEABUSE PROGRAM, SOME INFORMATION MAY BE OMITTED. This 
clinical summary was aggregated from multiple sources. Caution should be 
exercised in using it in the provision of clinical care. This summary normalizes
information from multiple sources, and as a consequence, information in this 
document may materially change the coding, format and clinical context of 
patient data. In addition, data may be omitted in some cases. CLINICAL DECISIONS
SHOULD BE BASED ON THE PRIMARY CLINICAL RECORDS. [facility] provides 
no warranty or guarantee of the accuracy or completeness of information in this 
document.